# Patient Record
Sex: FEMALE | Race: OTHER | NOT HISPANIC OR LATINO
[De-identification: names, ages, dates, MRNs, and addresses within clinical notes are randomized per-mention and may not be internally consistent; named-entity substitution may affect disease eponyms.]

---

## 2017-02-07 ENCOUNTER — APPOINTMENT (OUTPATIENT)
Dept: UROGYNECOLOGY | Facility: CLINIC | Age: 67
End: 2017-02-07

## 2017-03-08 ENCOUNTER — OUTPATIENT (OUTPATIENT)
Dept: OUTPATIENT SERVICES | Facility: HOSPITAL | Age: 67
LOS: 1 days | End: 2017-03-08
Payer: COMMERCIAL

## 2017-03-08 DIAGNOSIS — R06.09 OTHER FORMS OF DYSPNEA: ICD-10-CM

## 2017-03-08 PROCEDURE — A9502: CPT

## 2017-03-08 PROCEDURE — 93017 CV STRESS TEST TRACING ONLY: CPT

## 2017-03-08 PROCEDURE — 78452 HT MUSCLE IMAGE SPECT MULT: CPT

## 2017-05-09 ENCOUNTER — APPOINTMENT (OUTPATIENT)
Dept: UROGYNECOLOGY | Facility: CLINIC | Age: 67
End: 2017-05-09

## 2017-08-08 ENCOUNTER — APPOINTMENT (OUTPATIENT)
Dept: UROGYNECOLOGY | Facility: CLINIC | Age: 67
End: 2017-08-08
Payer: COMMERCIAL

## 2017-08-08 PROCEDURE — 99213 OFFICE O/P EST LOW 20 MIN: CPT

## 2017-08-08 RX ORDER — ESTRADIOL 0.1 MG/G
0.1 CREAM VAGINAL
Qty: 0.5 | Refills: 3 | Status: ACTIVE | COMMUNITY
Start: 2017-06-09 | End: 1900-01-01

## 2017-08-23 ENCOUNTER — APPOINTMENT (OUTPATIENT)
Dept: UROGYNECOLOGY | Facility: CLINIC | Age: 67
End: 2017-08-23
Payer: COMMERCIAL

## 2017-08-23 PROCEDURE — 99213 OFFICE O/P EST LOW 20 MIN: CPT

## 2017-10-24 ENCOUNTER — APPOINTMENT (OUTPATIENT)
Dept: UROGYNECOLOGY | Facility: CLINIC | Age: 67
End: 2017-10-24
Payer: COMMERCIAL

## 2017-10-24 PROCEDURE — 99213 OFFICE O/P EST LOW 20 MIN: CPT

## 2018-01-26 ENCOUNTER — APPOINTMENT (OUTPATIENT)
Dept: UROGYNECOLOGY | Facility: CLINIC | Age: 68
End: 2018-01-26
Payer: MEDICARE

## 2018-01-26 PROCEDURE — 99213 OFFICE O/P EST LOW 20 MIN: CPT

## 2018-03-30 ENCOUNTER — APPOINTMENT (OUTPATIENT)
Dept: UROGYNECOLOGY | Facility: CLINIC | Age: 68
End: 2018-03-30
Payer: MEDICARE

## 2018-03-30 PROCEDURE — 99213 OFFICE O/P EST LOW 20 MIN: CPT

## 2018-04-03 ENCOUNTER — APPOINTMENT (OUTPATIENT)
Dept: UROGYNECOLOGY | Facility: CLINIC | Age: 68
End: 2018-04-03

## 2018-05-25 ENCOUNTER — APPOINTMENT (OUTPATIENT)
Dept: UROGYNECOLOGY | Facility: CLINIC | Age: 68
End: 2018-05-25
Payer: MEDICARE

## 2018-05-25 PROCEDURE — 99213 OFFICE O/P EST LOW 20 MIN: CPT

## 2018-07-14 ENCOUNTER — EMERGENCY (EMERGENCY)
Facility: HOSPITAL | Age: 68
LOS: 1 days | Discharge: HOME | End: 2018-07-14
Attending: EMERGENCY MEDICINE
Payer: COMMERCIAL

## 2018-07-14 VITALS
RESPIRATION RATE: 18 BRPM | DIASTOLIC BLOOD PRESSURE: 76 MMHG | SYSTOLIC BLOOD PRESSURE: 184 MMHG | TEMPERATURE: 98 F | OXYGEN SATURATION: 99 % | HEART RATE: 76 BPM

## 2018-07-14 PROCEDURE — 73630 X-RAY EXAM OF FOOT: CPT

## 2018-07-14 PROCEDURE — 99283 EMERGENCY DEPT VISIT LOW MDM: CPT | Mod: 25

## 2018-07-14 PROCEDURE — 73630 X-RAY EXAM OF FOOT: CPT | Mod: 26,LT

## 2018-07-14 PROCEDURE — 99282 EMERGENCY DEPT VISIT SF MDM: CPT

## 2018-07-14 NOTE — ED PROVIDER NOTE - PROGRESS NOTE DETAILS
Xray negative for fracture. History and findings consistent with contusion. Will dc with olivia taping and ortho shoe. Pt is well appearing walking with steady gait, stable for discharge and follow up without fail with medical doctor. I had a detailed discussion with the patient and/or guardian regarding the historical points, exam findings, and any diagnostic results supporting the discharge diagnosis. Pt educated on care and need for follow up. Strict return instructions and red flag signs and symptoms discussed with patient. Questions answered. Pt shows understanding of discharge information and agrees to follow.

## 2018-07-14 NOTE — ED PROVIDER NOTE - PHYSICAL EXAMINATION
MUSCULOSKELETAL: equal warmth b/l  DP and PT pulses 2+ b/l  cap refill <2 s  no skin breakdown  no swelling of 4th digit  very minimal tenderness to the MPT joint  no subungual hematoma MUSCULOSKELETAL: equal warmth b/l  DP and PT pulses 2+ b/l  cap refill <2 s  no skin breakdown  no swelling of 4th digit  very minimal tenderness to the MTP joint  no subungual hematoma

## 2018-07-14 NOTE — ED PROVIDER NOTE - OBJECTIVE STATEMENT
69 y/o F pt with a PMHx of DM and HTN and no significant PSHx presents to the ED c/o L 4th digit pain. Pt reports that she walked into a ladder and banged her toe. Pt states that she has been taking extra strength Tylenol with relief. Pt denies any other complaints. Allergy; lisinopril, aleve. 69 y/o F pt with a PMHx of DM and HTN and no significant PSHx presents to the ED c/o L 4th digit pain. Pt reports that she walked into a ladder and banged her toe 4 days ago. Pt states that she has been taking extra strength Tylenol with relief. Pt denies any other complaints. Allergy; lisinopril, aleve.

## 2018-07-14 NOTE — ED PROVIDER NOTE - CARE PLAN
Principal Discharge DX:	Contusion of lesser toe of left foot without damage to nail, initial encounter

## 2018-07-14 NOTE — ED ADULT NURSE NOTE - ADDITIONAL PRINTED INSTRUCTIONS GIVEN
Pt seen, treated and released in intake, No signs of distress noted, no nursing intervention required. Pt d/c by NP.

## 2018-07-14 NOTE — ED PROVIDER NOTE - MEDICAL DECISION MAKING DETAILS
67 y/o F pt with traumatic L foot 4th digit pain x4 days. Well appearing, neurovascular intact. Low suspicion of fx. Plans for x-ray, olivia splint, ortho shoe and reassess

## 2018-07-14 NOTE — ED PROVIDER NOTE - ATTENDING CONTRIBUTION TO CARE
68 year old female c/o left 4th toe pain s/p trauma x few days. X-ray WNL, toe contusion, supportive care

## 2018-07-26 ENCOUNTER — APPOINTMENT (OUTPATIENT)
Dept: UROGYNECOLOGY | Facility: CLINIC | Age: 68
End: 2018-07-26
Payer: MEDICARE

## 2018-07-26 PROCEDURE — 99213 OFFICE O/P EST LOW 20 MIN: CPT

## 2018-09-27 ENCOUNTER — APPOINTMENT (OUTPATIENT)
Dept: UROGYNECOLOGY | Facility: CLINIC | Age: 68
End: 2018-09-27
Payer: MEDICARE

## 2018-09-27 PROCEDURE — 99213 OFFICE O/P EST LOW 20 MIN: CPT

## 2018-10-11 ENCOUNTER — APPOINTMENT (OUTPATIENT)
Dept: UROGYNECOLOGY | Facility: CLINIC | Age: 68
End: 2018-10-11
Payer: MEDICARE

## 2018-10-11 PROCEDURE — 99213 OFFICE O/P EST LOW 20 MIN: CPT

## 2019-01-14 ENCOUNTER — APPOINTMENT (OUTPATIENT)
Dept: UROGYNECOLOGY | Facility: CLINIC | Age: 69
End: 2019-01-14
Payer: MEDICARE

## 2019-01-14 DIAGNOSIS — N93.9 ABNORMAL UTERINE AND VAGINAL BLEEDING, UNSPECIFIED: ICD-10-CM

## 2019-01-14 PROCEDURE — 99213 OFFICE O/P EST LOW 20 MIN: CPT

## 2019-01-14 NOTE — PHYSICAL EXAM
[No Acute Distress] : in no acute distress [Well developed] : well developed [Well Nourished] : ~L well nourished [Good Hygeine] : demonstrates good hygeine [Oriented x3] : oriented to person, place, and time [Normal Memory] : ~T memory was ~L unimpaired [Normal Mood/Affect] : mood and affect are normal [Soft, Nontender] : the abdomen was soft and nontender [Warm and Dry] : was warm and dry to touch [Normal Gait] : gait was normal [Normal Appearance] : general appearance was normal [Atrophy] : atrophy [Cystocele] : a cystocele [Uterine Prolapse] : uterine prolapse [Discharge] : a  ~M vaginal discharge was present [Scant] : scant [Laura] : yellow [Moderate] : there was moderate vaginal bleeding [Normal] : normal [FreeTextEntry4] : + irritation to left vaginal wall and apex with moderate bleeding

## 2019-01-14 NOTE — PROCEDURE
[Erosion] : evidence of erosion [Erythema] : erythema noted [Discharge] : there is vaginal discharge [Pessary Out] : removed [Pessary Washed] : washed [H2O] : H2O [Moderate] : moderate bleeding [Hemostatic Agent used (Silver Nitrate)] : a hemostatic agent (Silver Nitrate) was used to resolve bleeding [Infection] : no evidence of infection [FreeTextEntry1] : RS #3 [de-identified] : Silver nitrate applied and hemostasis achieved

## 2019-01-14 NOTE — HISTORY OF PRESENT ILLNESS
[FreeTextEntry1] : Pt. happy with pessary.  Reports good support.  Denies any pelvic pain or pressure.  Does note some spotting x "a few days".  Denies any problems with urination or BM.  Using premarin BIW as instructed.

## 2019-01-14 NOTE — DISCUSSION/SUMMARY
[FreeTextEntry1] : Pessary left out.  Pt to f/u in 2-3 weeks for re-evaluation and possible reinsertion.  Continue premarin BIW.  Instructed to call with any questions or concerns and she verbalizes understanding.\par

## 2019-02-01 ENCOUNTER — APPOINTMENT (OUTPATIENT)
Dept: UROGYNECOLOGY | Facility: CLINIC | Age: 69
End: 2019-02-01
Payer: MEDICARE

## 2019-02-01 PROCEDURE — 99213 OFFICE O/P EST LOW 20 MIN: CPT

## 2019-02-01 NOTE — PHYSICAL EXAM
[No Acute Distress] : in no acute distress [Well developed] : well developed [Well Nourished] : ~L well nourished [Good Hygeine] : demonstrates good hygeine [Oriented x3] : oriented to person, place, and time [Normal Memory] : ~T memory was ~L unimpaired [Normal Mood/Affect] : mood and affect are normal [Soft, Nontender] : the abdomen was soft and nontender [Warm and Dry] : was warm and dry to touch [Normal Gait] : gait was normal [Normal Appearance] : general appearance was normal [Cystocele] : a cystocele [Uterine Prolapse] : uterine prolapse [No Bleeding] : there was no active vaginal bleeding [Normal] : normal [FreeTextEntry4] : irritation sites healed

## 2019-02-01 NOTE — PROCEDURE
[Good Fit] : fits well [Pessary Inserted] : inserted [Pessary Washed] : washed [H2O] : H2O [None] : no bleeding [Erosion] : no evidence of erosion [Erythema] : no erythema [Discharge] : no vaginal discharge [Infection] : no evidence of infection [FreeTextEntry1] : RS #3

## 2019-02-01 NOTE — HISTORY OF PRESENT ILLNESS
[FreeTextEntry1] : RS # 3 left out at last visit due to bleeding.  She denies any bleeding since pessary has been out.  She does note bothersome bulge and pelvic pressure and desires pessary reinsertion.  She has been using vaginal estrogen BIW.

## 2019-02-01 NOTE — DISCUSSION/SUMMARY
[FreeTextEntry1] : Pessary reinserted.  Continue premarin BIW.  F/u in 3 months or sooner if needed.  Instructed to call with any questions or concerns and she verbalizes understanding.\par

## 2019-05-22 ENCOUNTER — APPOINTMENT (OUTPATIENT)
Dept: UROGYNECOLOGY | Facility: CLINIC | Age: 69
End: 2019-05-22
Payer: MEDICARE

## 2019-05-22 DIAGNOSIS — N89.9 NONINFLAMMATORY DISORDER OF VAGINA, UNSPECIFIED: ICD-10-CM

## 2019-05-22 PROCEDURE — 99213 OFFICE O/P EST LOW 20 MIN: CPT

## 2019-05-22 NOTE — HISTORY OF PRESENT ILLNESS
[FreeTextEntry1] : Pt. reports good support with pessary.  Denies any pelvic pain or pressure.  Does note some vaginal spotting x 3-4 days.  Denies any problems with urination or BM.  She is using premarin BIW as instructed.

## 2019-05-22 NOTE — PROCEDURE
[Good Fit] : fits well [Erythema] : erythema noted [Discharge] : there is vaginal discharge [Pessary Out] : removed [Pessary Washed] : washed [H2O] : H2O [Moderate] : moderate bleeding [Resolved w/pressure] : was resolved by applying pressure [Erosion] : no evidence of erosion [Infection] : no evidence of infection [FreeTextEntry1] : RS #3

## 2019-05-22 NOTE — DISCUSSION/SUMMARY
[FreeTextEntry1] : Pessary left out d/t bleeding.  Continue vaginal estrogen BIW.  F/u 2-3 weeks and will teach pt self care at that visit.  Instructed to call with any questions or concerns and she verbalizes understanding.\par

## 2019-05-22 NOTE — PHYSICAL EXAM
[No Acute Distress] : in no acute distress [Well developed] : well developed [Well Nourished] : ~L well nourished [Good Hygeine] : demonstrates good hygeine [Oriented x3] : oriented to person, place, and time [Normal Memory] : ~T memory was ~L unimpaired [Normal Mood/Affect] : mood and affect are normal [Soft, Nontender] : the abdomen was soft and nontender [Warm and Dry] : was warm and dry to touch [Normal Gait] : gait was normal [Normal Appearance] : general appearance was normal [Atrophy] : atrophy [Cystocele] : a cystocele [Uterine Prolapse] : uterine prolapse [Discharge] : a  ~M vaginal discharge was present [Laura] : yellow [Blood-Tinged] : blood-tinged [Moderate] : there was moderate vaginal bleeding [Normal] : normal [FreeTextEntry4] : + irritations to posterior and right vaginal wall with + bleeding

## 2019-06-05 ENCOUNTER — APPOINTMENT (OUTPATIENT)
Dept: UROGYNECOLOGY | Facility: CLINIC | Age: 69
End: 2019-06-05
Payer: MEDICARE

## 2019-06-05 PROCEDURE — 99213 OFFICE O/P EST LOW 20 MIN: CPT

## 2019-06-05 NOTE — PHYSICAL EXAM
[Well developed] : well developed [No Acute Distress] : in no acute distress [Well Nourished] : ~L well nourished [Good Hygeine] : demonstrates good hygeine [Oriented x3] : oriented to person, place, and time [Soft, Nontender] : the abdomen was soft and nontender [Normal Mood/Affect] : mood and affect are normal [Normal Memory] : ~T memory was ~L unimpaired [Normal Gait] : gait was normal [Warm and Dry] : was warm and dry to touch [Atrophy] : atrophy [Normal Appearance] : general appearance was normal [Cystocele] : a cystocele [Uterine Prolapse] : uterine prolapse [Discharge] : a  ~M vaginal discharge was present [White] : white [Scant] : there was scant vaginal bleeding [Normal] : normal [FreeTextEntry4] : Old irritation sites healed.  one small area to posterior vaginal wall with friable tissue and scant bleeding

## 2019-06-05 NOTE — PROCEDURE
[Good Fit] : fits well [Discharge] : there is vaginal discharge [Pessary Washed] : washed [Pessary Inserted] : inserted [Resolved w/pressure] : was resolved by applying pressure [H2O] : H2O [Mild] : mild bleeding [Erosion] : no evidence of erosion [Erythema] : no erythema [Infection] : no evidence of infection [FreeTextEntry1] : RS #3 [FreeTextEntry8] : Pt taught self care, she was successfully able to insert and remove pessary on her own x 2.

## 2019-06-05 NOTE — DISCUSSION/SUMMARY
[FreeTextEntry1] : Pt. to perform weekly self care.  Instructions written down for pt.  F/u in 3 months or sooner if needed.  Continue premarin BIW.  Instructed to call with any questions or concerns and she verbalizes understanding.\par

## 2019-06-05 NOTE — HISTORY OF PRESENT ILLNESS
[FreeTextEntry1] : Hx cystocele, uterovaginal prolapse.  RS #3 left out at last visit due to bleeding.  She has been using premarin BIW and denies any bleeding since pessary out.  Desires pessary reinsertion and self care teaching.

## 2019-09-09 ENCOUNTER — APPOINTMENT (OUTPATIENT)
Dept: UROGYNECOLOGY | Facility: CLINIC | Age: 69
End: 2019-09-09
Payer: MEDICARE

## 2019-09-09 PROCEDURE — 99213 OFFICE O/P EST LOW 20 MIN: CPT

## 2019-09-09 NOTE — PHYSICAL EXAM
[No Acute Distress] : in no acute distress [Well developed] : well developed [Good Hygeine] : demonstrates good hygeine [Well Nourished] : ~L well nourished [Oriented x3] : oriented to person, place, and time [Normal Memory] : ~T memory was ~L unimpaired [Soft, Nontender] : the abdomen was soft and nontender [Normal Mood/Affect] : mood and affect are normal [Normal Gait] : gait was normal [Warm and Dry] : was warm and dry to touch [Normal Appearance] : general appearance was normal [Cystocele] : a cystocele [Atrophy] : atrophy [Discharge] : a  ~M vaginal discharge was present [Uterine Prolapse] : uterine prolapse [Scant] : there was scant vaginal bleeding [Clear] : clear [Normal] : normal [FreeTextEntry4] : + friable granulation tissue to left vaginal wall with scant bleeding

## 2019-09-09 NOTE — DISCUSSION/SUMMARY
[FreeTextEntry1] : Pt was able to insert pessary on her own in office.  Advised continuing weekly self care and premarin BIW.  Notes premarin is expensive.  Names of alternatives given to her and advised to ask pharmacy and call if there is a cheaper alternative.  F/u 3-4 months or sooner if needed.  Instructed to call with any questions or concerns and she verbalizes understanding.\par

## 2019-09-09 NOTE — PROCEDURE
[Good Fit] : fits well [Erythema] : erythema noted [Discharge] : there is vaginal discharge [Pessary Washed] : washed [Pessary Inserted] : inserted [H2O] : H2O [Mild] : mild bleeding [Resolved w/pressure] : was resolved by applying pressure [Erosion] : no evidence of erosion [Infection] : no evidence of infection [FreeTextEntry1] : RS #3

## 2019-09-09 NOTE — HISTORY OF PRESENT ILLNESS
[FreeTextEntry1] : Pt. happy with pessary.  She denies any pelvic pain, pressure or vaginal bleeding.  Denies any problems with urination or BM.  She was performing weekly self care since last visit but removed pessary in august and was unable to replace.  It has been out x 1 month.  She is using premarin BIW as instructed.

## 2020-01-08 ENCOUNTER — APPOINTMENT (OUTPATIENT)
Dept: UROGYNECOLOGY | Facility: CLINIC | Age: 70
End: 2020-01-08
Payer: MEDICARE

## 2020-01-08 PROCEDURE — 99213 OFFICE O/P EST LOW 20 MIN: CPT

## 2020-01-08 NOTE — PHYSICAL EXAM
[No Acute Distress] : in no acute distress [Well developed] : well developed [Well Nourished] : ~L well nourished [Good Hygeine] : demonstrates good hygeine [Oriented x3] : oriented to person, place, and time [Normal Memory] : ~T memory was ~L unimpaired [Normal Mood/Affect] : mood and affect are normal [Soft, Nontender] : the abdomen was soft and nontender [Warm and Dry] : was warm and dry to touch [Normal Gait] : gait was normal [Normal Appearance] : general appearance was normal [Atrophy] : atrophy [Cystocele] : a cystocele [Uterine Prolapse] : uterine prolapse [Scant] : there was scant vaginal bleeding [Normal] : normal [FreeTextEntry4] : + granulation tissue to left vaginal wall at apex

## 2020-01-08 NOTE — DISCUSSION/SUMMARY
[FreeTextEntry1] : Pessary reinserted.  She will continue premarin BIW.  F/u 3 months or sooner if needed.  States premarin is expensive, when she is ready for a refill, she will call and we will try something different.  Instructed to call with any questions or concerns and she verbalizes understanding.\par

## 2020-01-08 NOTE — HISTORY OF PRESENT ILLNESS
[FreeTextEntry1] : Pt states she was having some spotting so she removed pessary about 1 month ago.  Denies any spotting since pessary has been out.  Denies any pain or pressure when pessary is in.  She is using premarin BIW.  C/o being uncomfortable without pessary and desires reinsertion.

## 2020-01-08 NOTE — PROCEDURE
[Good Fit] : fits well [Erythema] : erythema noted [Pessary Inserted] : inserted [Pessary Washed] : washed [H2O] : H2O [Mild] : mild bleeding [Resolved w/pressure] : was resolved by applying pressure [Infection] : no evidence of infection [Discharge] : no vaginal discharge [Erosion] : no evidence of erosion [FreeTextEntry1] : RS #3

## 2020-04-20 ENCOUNTER — APPOINTMENT (OUTPATIENT)
Dept: UROGYNECOLOGY | Facility: CLINIC | Age: 70
End: 2020-04-20
Payer: MEDICARE

## 2020-04-20 PROCEDURE — 99213 OFFICE O/P EST LOW 20 MIN: CPT

## 2020-04-20 NOTE — PHYSICAL EXAM
[No Acute Distress] : in no acute distress [Well developed] : well developed [Well Nourished] : ~L well nourished [Good Hygeine] : demonstrates good hygeine [Oriented x3] : oriented to person, place, and time [Normal Mood/Affect] : mood and affect are normal [Normal Memory] : ~T memory was ~L unimpaired [Soft, Nontender] : the abdomen was soft and nontender [Warm and Dry] : was warm and dry to touch [Normal Gait] : gait was normal [Normal Appearance] : general appearance was normal [Atrophy] : atrophy [Cystocele] : a cystocele [Uterine Prolapse] : uterine prolapse [Discharge] : a  ~M vaginal discharge was present [Scant] : scant [Laura] : yellow [Moderate] : there was moderate vaginal bleeding [Normal] : normal [FreeTextEntry4] : + irritation to posterior vaginal wall with bleeding

## 2020-04-20 NOTE — DISCUSSION/SUMMARY
[FreeTextEntry1] : Source of bleeding identified on exam.  Pessary was reinserted.  Pt is able to remove on her own but not reinsert.  If she continues to have spotting this week, she will remove pessary on her own and schedule f/u appointment to reassess and reinsert in 2 weeks.  If no spotting, she will f/u in 2-3 months.  Continue premarin BIW.  Pt verbalizes understanding of plan.  Instructed to call with any questions or concerns and she verbalizes understanding.\par

## 2020-04-20 NOTE — PROCEDURE
[Good Fit] : fits well [Discharge] : there is vaginal discharge [Erythema] : erythema noted [Pessary Inserted] : inserted [H2O] : H2O [Pessary Washed] : washed [Moderate] : moderate bleeding [Resolved w/pressure] : was resolved by applying pressure [Infection] : no evidence of infection [FreeTextEntry1] : RS #3

## 2020-06-22 ENCOUNTER — APPOINTMENT (OUTPATIENT)
Dept: UROGYNECOLOGY | Facility: CLINIC | Age: 70
End: 2020-06-22
Payer: MEDICARE

## 2020-06-22 PROCEDURE — 99213 OFFICE O/P EST LOW 20 MIN: CPT

## 2020-06-22 NOTE — PROCEDURE
[Good Fit] : fits well [Erythema] : erythema noted [Pessary Inserted] : inserted [Pessary Washed] : washed [H2O] : H2O [Resolved w/pressure] : was resolved by applying pressure [Mild] : mild bleeding [Erosion] : no evidence of erosion [Discharge] : no vaginal discharge [Infection] : no evidence of infection [FreeTextEntry1] : RS #3

## 2020-06-22 NOTE — HISTORY OF PRESENT ILLNESS
[FreeTextEntry1] : Pt last seen 4/20.  Per note, if she had further spotting she would remove pessary on her own.  Notes that she had some spotting about 10 days after visit so she removed the pessary.  It has been out since.  She denies any bleeding or spotting since pessary has been out.  She does note bothersome vaginal bulge and would like pessary reinserted.  She has been using premarin BIW and denies any problems with urination or BM.

## 2020-06-22 NOTE — DISCUSSION/SUMMARY
[FreeTextEntry1] : Pessary was reinserted.  If spotting occurs again, she will remove pessary at home.  Continue premarin BIW and f/u 3 months or sooner if needed.  Instructed to call with any questions or concerns and she verbalizes understanding.\par

## 2020-06-22 NOTE — PHYSICAL EXAM
[No Acute Distress] : in no acute distress [Well developed] : well developed [Good Hygeine] : demonstrates good hygeine [Well Nourished] : ~L well nourished [Normal Memory] : ~T memory was ~L unimpaired [Normal Mood/Affect] : mood and affect are normal [Oriented x3] : oriented to person, place, and time [Soft, Nontender] : the abdomen was soft and nontender [Warm and Dry] : was warm and dry to touch [Normal Gait] : gait was normal [Normal Appearance] : general appearance was normal [Atrophy] : atrophy [Uterine Prolapse] : uterine prolapse [Cystocele] : a cystocele [Normal] : normal [Scant] : there was scant vaginal bleeding [FreeTextEntry4] : + friable granulation tissue to posterior vaginal wall which bled when touched with scopette

## 2020-08-03 ENCOUNTER — APPOINTMENT (OUTPATIENT)
Dept: UROGYNECOLOGY | Facility: CLINIC | Age: 70
End: 2020-08-03
Payer: COMMERCIAL

## 2020-08-03 PROCEDURE — 99213 OFFICE O/P EST LOW 20 MIN: CPT

## 2020-08-03 NOTE — DISCUSSION/SUMMARY
[FreeTextEntry1] : + bleeding on exam.  Pessary left out and advised f/u in 2-3 weeks for re-evaluation.  Continue premarin BIW.  Discussed pelvic ultrasound to r/o uterine cause, however this is unlikely as source of bleeding was identified on exam.  If pt has any bleeding with pessary out, she will have ultrasound done.  She verbalizes understanding plan.  Instructed to call with any questions or concerns and she verbalizes understanding.\par

## 2020-08-03 NOTE — PROCEDURE
[Pessary Out] : removed [Mild] : mild bleeding [H2O] : H2O [Resolved w/pressure] : was resolved by applying pressure [FreeTextEntry1] : RS #3

## 2020-08-03 NOTE — HISTORY OF PRESENT ILLNESS
[FreeTextEntry1] : Pt last seen 6/22/20.  Notes that on 7/24 she had some spotting so she removed the pessary on her own.  After removal, she had an episode of "period like bleeding" which resolved spontaneously.  She has not has any bleeding since pessary has been out.  She is nervous that she caused damage when she removed pessary.  Does note bothersome bulge without pessary in place.  She is using premarin BIW.

## 2020-08-03 NOTE — PHYSICAL EXAM
[Well developed] : well developed [No Acute Distress] : in no acute distress [Well Nourished] : ~L well nourished [Good Hygeine] : demonstrates good hygeine [Oriented x3] : oriented to person, place, and time [Normal Memory] : ~T memory was ~L unimpaired [Soft, Nontender] : the abdomen was soft and nontender [Normal Mood/Affect] : mood and affect are normal [Warm and Dry] : was warm and dry to touch [Normal Gait] : gait was normal [Normal Appearance] : general appearance was normal [Atrophy] : atrophy [Cystocele] : a cystocele [Uterine Prolapse] : uterine prolapse [White] : white [Discharge] : a  ~M vaginal discharge was present [Scant] : there was scant vaginal bleeding [Normal] : normal [FreeTextEntry4] : + friable granulation tissue to right vaginal wall with bleeding

## 2020-08-17 ENCOUNTER — APPOINTMENT (OUTPATIENT)
Dept: UROGYNECOLOGY | Facility: CLINIC | Age: 70
End: 2020-08-17

## 2020-08-17 ENCOUNTER — APPOINTMENT (OUTPATIENT)
Dept: UROGYNECOLOGY | Facility: CLINIC | Age: 70
End: 2020-08-17
Payer: COMMERCIAL

## 2020-08-17 PROCEDURE — 99213 OFFICE O/P EST LOW 20 MIN: CPT

## 2020-08-17 NOTE — PHYSICAL EXAM
[No Acute Distress] : in no acute distress [Well developed] : well developed [Well Nourished] : ~L well nourished [Good Hygeine] : demonstrates good hygeine [Normal Memory] : ~T memory was ~L unimpaired [Oriented x3] : oriented to person, place, and time [Normal Mood/Affect] : mood and affect are normal [Warm and Dry] : was warm and dry to touch [Soft, Nontender] : the abdomen was soft and nontender [Atrophy] : atrophy [Normal Appearance] : general appearance was normal [Cystocele] : a cystocele [Uterine Prolapse] : uterine prolapse [Discharge] : a  ~M vaginal discharge was present [Scant] : scant [White] : white [Normal] : normal [No Bleeding] : there was no active vaginal bleeding [FreeTextEntry4] : cystocele past introitus

## 2020-08-17 NOTE — HISTORY OF PRESENT ILLNESS
[FreeTextEntry1] : Pt was seen 8/3 and there was still some friable granulation tissue.  Pessary has now been out since 7/24 and she denies any bleeding since pessary has been out.  She desires reinsertion.  Using premarin BIW.

## 2020-08-17 NOTE — PROCEDURE
[Good Fit] : fits well [Discharge] : there is vaginal discharge [Pessary Inserted] : inserted [Pessary Washed] : washed [None] : no bleeding [H2O] : H2O [Erosion] : no evidence of erosion [Erythema] : no erythema [FreeTextEntry1] : RS #3 [Infection] : no evidence of infection

## 2020-08-17 NOTE — DISCUSSION/SUMMARY
[FreeTextEntry1] : No further bleeding on exam.  Pessary inserted.  Discussed the possibility of pessary falling out at home as she cystocele was past introitus on exam.  Continue premarin BIW and f/u in 3 months or sooner if needed.  Instructed to call with any questions or concerns and she verbalizes understanding.\par

## 2020-09-21 ENCOUNTER — APPOINTMENT (OUTPATIENT)
Dept: UROGYNECOLOGY | Facility: CLINIC | Age: 70
End: 2020-09-21

## 2020-09-28 ENCOUNTER — APPOINTMENT (OUTPATIENT)
Dept: UROGYNECOLOGY | Facility: CLINIC | Age: 70
End: 2020-09-28
Payer: COMMERCIAL

## 2020-09-28 DIAGNOSIS — N95.0 POSTMENOPAUSAL BLEEDING: ICD-10-CM

## 2020-09-28 DIAGNOSIS — N89.8 OTHER SPECIFIED NONINFLAMMATORY DISORDERS OF VAGINA: ICD-10-CM

## 2020-09-28 PROCEDURE — 99213 OFFICE O/P EST LOW 20 MIN: CPT

## 2020-09-28 NOTE — PHYSICAL EXAM
[No Acute Distress] : in no acute distress [Well developed] : well developed [Well Nourished] : ~L well nourished [Good Hygeine] : demonstrates good hygeine [Oriented x3] : oriented to person, place, and time [Normal Memory] : ~T memory was ~L unimpaired [Normal Mood/Affect] : mood and affect are normal [Soft, Nontender] : the abdomen was soft and nontender [Warm and Dry] : was warm and dry to touch [Normal Gait] : gait was normal [Normal Appearance] : general appearance was normal [Atrophy] : atrophy [Cystocele] : a cystocele [Uterine Prolapse] : uterine prolapse [Discharge] : a  ~M vaginal discharge was present [White] : white [Scant] : there was scant vaginal bleeding [Normal] : normal [FreeTextEntry4] : + irritation to posterior vaginal wall with + bleeding [de-identified] : + friable granulation tissue from 7:00-9:00 with + bleeding

## 2020-09-28 NOTE — DISCUSSION/SUMMARY
[FreeTextEntry1] : Discussed that friable granulation tissue to cervix is the likely source of spotting.  Pt is very anxious re: vaginal bleeding.  Discussed going for pelvic ultrasound to r/o uterine cause and pt would like to have this done.  Pessary left out, she will schedule ultrasound.  She will hold off on vaginal estrogen use until ultrasound is completed.  F/u 2 weeks or sooner if needed.  Instructed to call with any questions or concerns and she verbalizes understanding.\par

## 2020-09-28 NOTE — PROCEDURE
[Good Fit] : fits well [Erosion] : no evidence of erosion [Erythema] : erythema noted [Discharge] : there is vaginal discharge [Infection] : no evidence of infection [Pessary Out] : removed [H2O] : H2O [Mild] : mild bleeding [Resolved w/pressure] : was resolved by applying pressure [FreeTextEntry1] : RS #3

## 2020-09-28 NOTE — HISTORY OF PRESENT ILLNESS
[FreeTextEntry1] : Pt last seen 8/17/20.  Prior to that visit, pessary had been left out d/t bleeding.  No bleeding when pessary was out so pessary was reinserted.  Now pt c/o intermittent spotting x a few weeks.  She is using vaginal estrogen BIW.  Denies any problems with urination and she denies any constipation.  Pt very concerned.

## 2020-10-02 ENCOUNTER — APPOINTMENT (OUTPATIENT)
Dept: ULTRASOUND IMAGING | Facility: CLINIC | Age: 70
End: 2020-10-02
Payer: COMMERCIAL

## 2020-10-02 ENCOUNTER — OUTPATIENT (OUTPATIENT)
Dept: OUTPATIENT SERVICES | Facility: HOSPITAL | Age: 70
LOS: 1 days | End: 2020-10-02
Payer: COMMERCIAL

## 2020-10-02 DIAGNOSIS — Z00.8 ENCOUNTER FOR OTHER GENERAL EXAMINATION: ICD-10-CM

## 2020-10-02 PROCEDURE — 76830 TRANSVAGINAL US NON-OB: CPT | Mod: 26

## 2020-10-02 PROCEDURE — 76830 TRANSVAGINAL US NON-OB: CPT

## 2020-10-14 ENCOUNTER — APPOINTMENT (OUTPATIENT)
Dept: UROGYNECOLOGY | Facility: CLINIC | Age: 70
End: 2020-10-14
Payer: COMMERCIAL

## 2020-10-14 PROCEDURE — 99213 OFFICE O/P EST LOW 20 MIN: CPT

## 2020-10-14 NOTE — HISTORY OF PRESENT ILLNESS
[FreeTextEntry1] : Pt had pessary left out due to irritation.  PT had Pelvic US and was negative.\par She would like to have pessary reinserted as she is uncomfortable and prolapse is coming down.\par She is using Premarin cream twice a week.

## 2020-10-14 NOTE — PHYSICAL EXAM
[No Acute Distress] : in no acute distress [Well developed] : well developed [Good Hygeine] : demonstrates good hygeine [Well Nourished] : ~L well nourished [Oriented x3] : oriented to person, place, and time [Normal Memory] : ~T memory was ~L unimpaired [Normal Mood/Affect] : mood and affect are normal [Warm and Dry] : was warm and dry to touch [Soft, Nontender] : the abdomen was soft and nontender [Normal Gait] : gait was normal [Normal Appearance] : general appearance was normal [Atrophy] : atrophy [Discharge] : a  ~M vaginal discharge was present [Cystocele] : a cystocele [Uterine Prolapse] : uterine prolapse [No Bleeding] : there was no active vaginal bleeding [Scant] : scant [White] : white [Normal] : normal [FreeTextEntry4] : Old irritation site healed [de-identified] : + friable granulation tissue from 7:00-9:00 with + bleeding

## 2020-10-14 NOTE — PROCEDURE
[Good Fit] : fits well [Discharge] : there is vaginal discharge [Erythema] : erythema noted [Pessary Inserted] : inserted [Mild] : mild bleeding [Pessary Washed] : washed [H2O] : H2O [Resolved w/pressure] : was resolved by applying pressure [Infection] : no evidence of infection [Erosion] : no evidence of erosion [FreeTextEntry1] : Ring with Support #3 [FreeTextEntry3] : Premarin cream [FreeTextEntry8] : Reinforced daily pericare.

## 2020-10-14 NOTE — DISCUSSION/SUMMARY
[FreeTextEntry1] : Pessary reinserted.  She will continue use of Premarin cream.  She will follow up end of November for pessary care.\par If pt have any problems/concern to call office. PT aware and agrees.

## 2020-11-16 ENCOUNTER — APPOINTMENT (OUTPATIENT)
Dept: UROGYNECOLOGY | Facility: CLINIC | Age: 70
End: 2020-11-16

## 2020-11-25 ENCOUNTER — APPOINTMENT (OUTPATIENT)
Dept: UROGYNECOLOGY | Facility: CLINIC | Age: 70
End: 2020-11-25
Payer: COMMERCIAL

## 2020-11-25 VITALS — WEIGHT: 128 LBS | HEIGHT: 60 IN | TEMPERATURE: 96.8 F | BODY MASS INDEX: 25.13 KG/M2

## 2020-11-25 VITALS — HEART RATE: 83 BPM | OXYGEN SATURATION: 93 %

## 2020-11-25 PROCEDURE — 99214 OFFICE O/P EST MOD 30 MIN: CPT

## 2020-11-25 NOTE — PROCEDURE
[Good Fit] : fits well [Erythema] : erythema noted [Discharge] : there is vaginal discharge [Pessary Inserted] : inserted [Pessary Washed] : washed [H2O] : H2O [Mild] : mild bleeding [Resolved w/pressure] : was resolved by applying pressure [Erosion] : no evidence of erosion [Infection] : no evidence of infection [FreeTextEntry1] : Ring with Support #3 [FreeTextEntry3] : Premarin cream [FreeTextEntry8] : Reinforced daily pericare.

## 2020-11-25 NOTE — HISTORY OF PRESENT ILLNESS
[FreeTextEntry1] : PT doing well with pessary.  Denies any pain, discomfort, or vaginal bleeding.  Voiding and moving BM without problems.\par She is using Premarin cream twice a week.

## 2020-11-25 NOTE — DISCUSSION/SUMMARY
[FreeTextEntry1] : Follow up in 2 months or sooner.  \par If pt have any problems/concern to call office. PT aware and agrees.

## 2021-01-27 ENCOUNTER — APPOINTMENT (OUTPATIENT)
Dept: UROGYNECOLOGY | Facility: CLINIC | Age: 71
End: 2021-01-27

## 2021-02-05 ENCOUNTER — APPOINTMENT (OUTPATIENT)
Dept: UROGYNECOLOGY | Facility: CLINIC | Age: 71
End: 2021-02-05
Payer: MEDICARE

## 2021-02-05 PROCEDURE — 99072 ADDL SUPL MATRL&STAF TM PHE: CPT

## 2021-02-05 PROCEDURE — 99213 OFFICE O/P EST LOW 20 MIN: CPT

## 2021-02-05 NOTE — PHYSICAL EXAM
[No Acute Distress] : in no acute distress [Well developed] : well developed [Well Nourished] : ~L well nourished [Good Hygeine] : demonstrates good hygeine [Oriented x3] : oriented to person, place, and time [Normal Memory] : ~T memory was ~L unimpaired [Normal Mood/Affect] : mood and affect are normal [Soft, Nontender] : the abdomen was soft and nontender [Warm and Dry] : was warm and dry to touch [Normal Gait] : gait was normal [Normal Appearance] : general appearance was normal [Atrophy] : atrophy [Cystocele] : a cystocele [Uterine Prolapse] : uterine prolapse [Discharge] : a  ~M vaginal discharge was present [Scant] : scant [White] : white [Normal] : normal [No Bleeding] : there was no active vaginal bleeding

## 2021-02-05 NOTE — HISTORY OF PRESENT ILLNESS
[FreeTextEntry1] : PT doing well with pessary.  Denies any pain, discomfort, or vaginal bleeding.  Voiding and moving BM without problems.\par She is using Premarin cream twice a week.\par Pt's 89y/o  mother just passed away 1 week ago.  Pt is sad but she have good family/friend support.

## 2021-02-05 NOTE — DISCUSSION/SUMMARY
[FreeTextEntry1] : Follow up in 2-3 months or sooner.  \par If pt have any problems/concern to call office. PT aware and agrees.

## 2021-05-04 ENCOUNTER — APPOINTMENT (OUTPATIENT)
Dept: UROGYNECOLOGY | Facility: CLINIC | Age: 71
End: 2021-05-04
Payer: MEDICARE

## 2021-05-04 VITALS
BODY MASS INDEX: 25.39 KG/M2 | DIASTOLIC BLOOD PRESSURE: 80 MMHG | SYSTOLIC BLOOD PRESSURE: 177 MMHG | TEMPERATURE: 96.6 F | WEIGHT: 129.31 LBS | OXYGEN SATURATION: 99 % | HEIGHT: 60 IN | HEART RATE: 88 BPM

## 2021-05-04 PROCEDURE — 99072 ADDL SUPL MATRL&STAF TM PHE: CPT

## 2021-05-04 PROCEDURE — 99213 OFFICE O/P EST LOW 20 MIN: CPT

## 2021-05-04 NOTE — HISTORY OF PRESENT ILLNESS
[FreeTextEntry1] : PT doing well with pessary.  Denies any pain, discomfort, or vaginal bleeding.  Voiding and moving BM without problems. Noticed vaginal bulge at the vaginal opening.  \par She is using Premarin cream twice a week.\par

## 2021-05-04 NOTE — PHYSICAL EXAM
[No Acute Distress] : in no acute distress [Well developed] : well developed [Well Nourished] : ~L well nourished [Good Hygeine] : demonstrates good hygeine [Oriented x3] : oriented to person, place, and time [Normal Memory] : ~T memory was ~L unimpaired [Normal Mood/Affect] : mood and affect are normal [Soft, Nontender] : the abdomen was soft and nontender [Warm and Dry] : was warm and dry to touch [Normal Gait] : gait was normal [Vulvar Atrophy] : vulvar atrophy [Labia Majora] : were normal [Normal Appearance] : general appearance was normal [Atrophy] : atrophy [Cystocele] : a cystocele [Uterine Prolapse] : uterine prolapse [Discharge] : a  ~M vaginal discharge was present [Scant] : scant [White] : white [No Bleeding] : there was no active vaginal bleeding [Normal] : normal [de-identified] : vaginal bulge noted at vaginal opening.

## 2021-05-04 NOTE — PROCEDURE
[Refit] : refit needed [Discharge] : there is vaginal discharge [Pessary Inserted] : inserted [Pessary Washed] : washed [H2O] : H2O [Mild] : mild bleeding [Resolved w/pressure] : was resolved by applying pressure [Medication Review] : Medicaiton Review: Patient verbalizes understanding of risks and benefits [Good Fit] : fit is not good [Erosion] : no evidence of erosion [Erythema] : no erythema [Infection] : no evidence of infection [FreeTextEntry1] : Ring with Support #3 [de-identified] : not supportive [de-identified] : Jhonny LS # 2 1/4 [FreeTextEntry3] : Premarin cream [FreeTextEntry8] : Reinforced daily pericare.

## 2021-06-03 ENCOUNTER — APPOINTMENT (OUTPATIENT)
Dept: UROGYNECOLOGY | Facility: CLINIC | Age: 71
End: 2021-06-03
Payer: MEDICARE

## 2021-06-03 PROCEDURE — 99213 OFFICE O/P EST LOW 20 MIN: CPT

## 2021-06-03 NOTE — PHYSICAL EXAM
[No Acute Distress] : in no acute distress [Well developed] : well developed [Well Nourished] : ~L well nourished [Good Hygeine] : demonstrates good hygeine [Oriented x3] : oriented to person, place, and time [Normal Memory] : ~T memory was ~L unimpaired [Normal Mood/Affect] : mood and affect are normal [Soft, Nontender] : the abdomen was soft and nontender [Warm and Dry] : was warm and dry to touch [Normal Gait] : gait was normal [Vulvar Atrophy] : vulvar atrophy [Labia Majora] : were normal [Normal Appearance] : general appearance was normal [Atrophy] : atrophy [Cystocele] : a cystocele [Uterine Prolapse] : uterine prolapse [Discharge] : a  ~M vaginal discharge was present [Scant] : scant [White] : white [No Bleeding] : there was no active vaginal bleeding [Normal] : normal [de-identified] : vaginal bulge noted at vaginal opening.  Gellhorn very uncomfortable.

## 2021-06-03 NOTE — DISCUSSION/SUMMARY
[FreeTextEntry1] : Pelvic prolapse:\par -Fitted with Shaatz # 4 (2.50).\par -Precaution and instructions were reviewed.\par \par Vaginal atrophy:\par -Continue use of Premarin cream BIW.\par -May use vaginal lubrication on alternative days.\par \par Follow up in 1 month or sooner.  \par If pt have any problems/concern to call office. PT aware and agrees.

## 2021-06-03 NOTE — HISTORY OF PRESENT ILLNESS
[FreeTextEntry1] : PT doing well with pessary.  Denies any pain, discomfort, or vaginal bleeding.  Voiding and moving BM without problems. Noticed vaginal bulge at the vaginal opening.  Pt was refitted at last visit with Gellhorn LS # 2 1/4.  Pt c/o being uncomfortable and falling out and she doesn't want to have this particular pessary.\par She is using Premarin cream twice a week.\par \par Pt had tried Ring with support # 3 and Gellhorn LS # 2 1/4.  Not supportive and uncomfortable with Gellhorn.

## 2021-06-03 NOTE — PROCEDURE
[Good Fit] : fit is not good [Refit] : refit needed [Erosion] : no evidence of erosion [Erythema] : no erythema [Discharge] : there is vaginal discharge [Infection] : no evidence of infection [Pessary Inserted] : inserted [Mild] : mild bleeding [Resolved w/pressure] : was resolved by applying pressure [Medication Review] : Medicaiton Review: Patient verbalizes understanding of risks and benefits [FreeTextEntry1] : had Ring with Support #3, refitted with Gellhorn LS # 2 1/4 [de-identified] : not supportive, and uncomfortable. [de-identified] : Sonal # 4 [FreeTextEntry3] : Premarin cream [FreeTextEntry8] : Reinforced daily pericare.

## 2021-07-09 ENCOUNTER — APPOINTMENT (OUTPATIENT)
Dept: UROGYNECOLOGY | Facility: CLINIC | Age: 71
End: 2021-07-09
Payer: MEDICARE

## 2021-07-09 PROCEDURE — 99213 OFFICE O/P EST LOW 20 MIN: CPT

## 2021-07-09 NOTE — HISTORY OF PRESENT ILLNESS
[FreeTextEntry1] : May, 69y/o female presents to the office for pelvic prolapse.  Was fitted with Shaatz # 4 (2.50),  PT doing well with pessary.  Denies any pain, discomfort, or vaginal bleeding.  Voiding and moving BM with some constipation.\par She is using Premarin cream twice a week.\par Pessary fell out the other day with constipation.  Otherwise, she feels the size is comfortable and supportive.\par \par Pt had tried Ring with support # 3 and Gellhorn LS # 2 1/4.  Not supportive and uncomfortable with Gellhorn.

## 2021-07-09 NOTE — DISCUSSION/SUMMARY
[FreeTextEntry1] : Pelvic prolapse:\par -Continue with Shaatz # 4 (2.50).\par -Precaution and instructions were reviewed.\par \par Vaginal atrophy:\par -Continue use of Premarin cream BIW.\par -May use vaginal lubrication on alternative days.\par \par Follow up in 2-3 months or sooner.  \par If pt have any problems/concern to call office. PT aware and agrees.

## 2021-07-09 NOTE — PROCEDURE
[Good Fit] : fits well [Discharge] : there is vaginal discharge [Pessary Inserted] : inserted [Pessary Washed] : washed [H2O] : H2O [Mild] : mild bleeding [Resolved w/pressure] : was resolved by applying pressure [Medication Review] : Medicaiton Review: Patient verbalizes understanding of risks and benefits [Erosion] : no evidence of erosion [Erythema] : no erythema [Infection] : no evidence of infection [FreeTextEntry1] : Sonal# 4 (2.50) [FreeTextEntry3] : Premarin cream [FreeTextEntry8] : Reinforced daily pericare.

## 2021-07-09 NOTE — PHYSICAL EXAM
[No Acute Distress] : in no acute distress [Well developed] : well developed [Well Nourished] : ~L well nourished [Good Hygeine] : demonstrates good hygeine [Oriented x3] : oriented to person, place, and time [Normal Memory] : ~T memory was ~L unimpaired [Normal Mood/Affect] : mood and affect are normal [Soft, Nontender] : the abdomen was soft and nontender [Warm and Dry] : was warm and dry to touch [Normal Gait] : gait was normal [Vulvar Atrophy] : vulvar atrophy [Labia Majora] : were normal [Normal Appearance] : general appearance was normal [Atrophy] : atrophy [Cystocele] : a cystocele [Uterine Prolapse] : uterine prolapse [Discharge] : a  ~M vaginal discharge was present [Scant] : scant [White] : white [No Bleeding] : there was no active vaginal bleeding [Normal] : normal [de-identified] : vaginal bulge noted at vaginal opening

## 2021-09-03 ENCOUNTER — APPOINTMENT (OUTPATIENT)
Dept: UROGYNECOLOGY | Facility: CLINIC | Age: 71
End: 2021-09-03
Payer: MEDICARE

## 2021-09-03 PROCEDURE — 99213 OFFICE O/P EST LOW 20 MIN: CPT

## 2021-09-03 NOTE — PHYSICAL EXAM
[No Acute Distress] : in no acute distress [Well developed] : well developed [Well Nourished] : ~L well nourished [Good Hygeine] : demonstrates good hygeine [Oriented x3] : oriented to person, place, and time [Normal Memory] : ~T memory was ~L unimpaired [Normal Mood/Affect] : mood and affect are normal [Soft, Nontender] : the abdomen was soft and nontender [Warm and Dry] : was warm and dry to touch [Normal Gait] : gait was normal [Vulvar Atrophy] : vulvar atrophy [Labia Majora] : were normal [Normal Appearance] : general appearance was normal [Atrophy] : atrophy [Cystocele] : a cystocele [Uterine Prolapse] : uterine prolapse [Discharge] : a  ~M vaginal discharge was present [Scant] : scant [White] : white [No Bleeding] : there was no active vaginal bleeding [Normal] : normal [de-identified] : vaginal bulge noted at vaginal opening

## 2021-09-03 NOTE — HISTORY OF PRESENT ILLNESS
[FreeTextEntry1] : May, 71y/o female presents to the office for pelvic prolapse.  Was fitted with Shaatz # 4 (2.50),  PT doing well with pessary.  Denies any pain, discomfort, or vaginal bleeding.  Voiding and moving BM with some constipation.\par She is using Premarin cream twice a week.\par Pessary fell out the other day with constipation.  Otherwise, she feels the size is comfortable and supportive.\par \par Pt had tried Ring with support # 3 and Gellhorn LS # 2 1/4.  Not supportive and uncomfortable with Gellhorn.

## 2021-09-22 ENCOUNTER — APPOINTMENT (OUTPATIENT)
Dept: UROGYNECOLOGY | Facility: CLINIC | Age: 71
End: 2021-09-22
Payer: MEDICARE

## 2021-09-22 VITALS
DIASTOLIC BLOOD PRESSURE: 77 MMHG | SYSTOLIC BLOOD PRESSURE: 146 MMHG | TEMPERATURE: 97.3 F | HEART RATE: 76 BPM | OXYGEN SATURATION: 100 %

## 2021-09-22 PROCEDURE — 99213 OFFICE O/P EST LOW 20 MIN: CPT

## 2021-09-22 NOTE — HISTORY OF PRESENT ILLNESS
[FreeTextEntry1] : May, 69y/o female with known hx of POP managed with Shaatz #4 (2.50) presents to the office with complaints of feeling slight;y uncomfortable from the pessary.  Denies any pelvic pain, pressure or vaginal bleeding.  Denies any urinary complaints. She states she has been having constipation. She manages her constipation with fluids and prune juice, though she continues to have constipation at times. She is overall happy with the pessary and the support it provides she just wants to make sure it is in the correct position. \par She is using Premarin cream twice a week.\par \par Pt had tried Ring with support # 3 and Gellhorn LS # 2 1/4.  Not supportive and uncomfortable with Gellhorn.

## 2021-09-22 NOTE — DISCUSSION/SUMMARY
[FreeTextEntry1] : Pelvic prolapse:\par - Continue with Shaatz # 4 (2.50).\par - Pessary Precautions and instructions were reviewed.\par - Discussed importance of preventing constipation by managing with fluids, diet, and OTC stool softeners as needed\par \par Vaginal atrophy:\par - Continue use of Premarin cream BIW.\par - May use vaginal lubrication on alternative days.\par \par Follow up in 2-3 months or sooner if needed  \par If pt have any problems/concern to call office. PT aware and agrees.

## 2021-09-22 NOTE — PHYSICAL EXAM
[No Acute Distress] : in no acute distress [Well developed] : well developed [Well Nourished] : ~L well nourished [Good Hygeine] : demonstrates good hygeine [Oriented x3] : oriented to person, place, and time [Normal Memory] : ~T memory was ~L unimpaired [Normal Mood/Affect] : mood and affect are normal [Soft, Nontender] : the abdomen was soft and nontender [Warm and Dry] : was warm and dry to touch [Normal Gait] : gait was normal [Vulvar Atrophy] : vulvar atrophy [Labia Majora] : were normal [Normal Appearance] : general appearance was normal [Atrophy] : atrophy [Cystocele] : a cystocele [Uterine Prolapse] : uterine prolapse [Discharge] : a  ~M vaginal discharge was present [Scant] : scant [White] : white [No Bleeding] : there was no active vaginal bleeding [Normal] : normal [de-identified] : vaginal bulge noted at vaginal opening [FreeTextEntry4] : Pessary found to be slightly tilted in vaginal canal

## 2021-09-22 NOTE — PROCEDURE
[Good Fit] : fits well [Erosion] : no evidence of erosion [Erythema] : no erythema [Discharge] : there is vaginal discharge [Infection] : no evidence of infection [Pessary Inserted] : inserted [Pessary Washed] : washed [H2O] : H2O [None] : no bleeding [Medication Review] : Medicaiton Review: Patient verbalizes understanding of risks and benefits [FreeTextEntry1] : Sonal# 4 (2.50) [de-identified] : discussed refitting with bigger size, patient refused states she is happy with this pessary and does not want to try another size or shape at this time.  [FreeTextEntry3] : Premarin cream [FreeTextEntry8] : Reinforced daily pericare.

## 2021-09-26 ENCOUNTER — INPATIENT (INPATIENT)
Facility: HOSPITAL | Age: 71
LOS: 0 days | Discharge: ANOTHER HOSPITAL | DRG: 305 | End: 2021-09-27
Attending: HOSPITALIST | Admitting: HOSPITALIST
Payer: COMMERCIAL

## 2021-09-26 VITALS
RESPIRATION RATE: 22 BRPM | TEMPERATURE: 98 F | WEIGHT: 130.07 LBS | DIASTOLIC BLOOD PRESSURE: 91 MMHG | OXYGEN SATURATION: 95 % | HEART RATE: 100 BPM | SYSTOLIC BLOOD PRESSURE: 176 MMHG | HEIGHT: 60 IN

## 2021-09-26 DIAGNOSIS — Z29.9 ENCOUNTER FOR PROPHYLACTIC MEASURES, UNSPECIFIED: ICD-10-CM

## 2021-09-26 DIAGNOSIS — R77.8 OTHER SPECIFIED ABNORMALITIES OF PLASMA PROTEINS: ICD-10-CM

## 2021-09-26 DIAGNOSIS — I16.1 HYPERTENSIVE EMERGENCY: ICD-10-CM

## 2021-09-26 DIAGNOSIS — I10 ESSENTIAL (PRIMARY) HYPERTENSION: ICD-10-CM

## 2021-09-26 DIAGNOSIS — E11.9 TYPE 2 DIABETES MELLITUS WITHOUT COMPLICATIONS: ICD-10-CM

## 2021-09-26 DIAGNOSIS — I50.1 LEFT VENTRICULAR FAILURE, UNSPECIFIED: ICD-10-CM

## 2021-09-26 DIAGNOSIS — N18.9 CHRONIC KIDNEY DISEASE, UNSPECIFIED: ICD-10-CM

## 2021-09-26 DIAGNOSIS — E87.70 FLUID OVERLOAD, UNSPECIFIED: ICD-10-CM

## 2021-09-26 DIAGNOSIS — J81.0 ACUTE PULMONARY EDEMA: ICD-10-CM

## 2021-09-26 LAB
ALBUMIN SERPL ELPH-MCNC: 3.4 G/DL — LOW (ref 3.5–5)
ALBUMIN SERPL ELPH-MCNC: 3.5 G/DL — SIGNIFICANT CHANGE UP (ref 3.5–5)
ALP SERPL-CCNC: 102 U/L — SIGNIFICANT CHANGE UP (ref 40–120)
ALP SERPL-CCNC: 102 U/L — SIGNIFICANT CHANGE UP (ref 40–120)
ALT FLD-CCNC: 18 U/L DA — SIGNIFICANT CHANGE UP (ref 10–60)
ALT FLD-CCNC: 23 U/L DA — SIGNIFICANT CHANGE UP (ref 10–60)
ANION GAP SERPL CALC-SCNC: 12 MMOL/L — SIGNIFICANT CHANGE UP (ref 5–17)
ANION GAP SERPL CALC-SCNC: 4 MMOL/L — LOW (ref 5–17)
APTT BLD: 72.1 SEC — HIGH (ref 27.5–35.5)
AST SERPL-CCNC: 21 U/L — SIGNIFICANT CHANGE UP (ref 10–40)
AST SERPL-CCNC: 54 U/L — HIGH (ref 10–40)
BASOPHILS # BLD AUTO: 0.06 K/UL — SIGNIFICANT CHANGE UP (ref 0–0.2)
BASOPHILS NFR BLD AUTO: 0.5 % — SIGNIFICANT CHANGE UP (ref 0–2)
BILIRUB SERPL-MCNC: 0.2 MG/DL — SIGNIFICANT CHANGE UP (ref 0.2–1.2)
BILIRUB SERPL-MCNC: 0.4 MG/DL — SIGNIFICANT CHANGE UP (ref 0.2–1.2)
BUN SERPL-MCNC: 25 MG/DL — HIGH (ref 7–18)
BUN SERPL-MCNC: 29 MG/DL — HIGH (ref 7–18)
CALCIUM SERPL-MCNC: 9 MG/DL — SIGNIFICANT CHANGE UP (ref 8.4–10.5)
CALCIUM SERPL-MCNC: 9.4 MG/DL — SIGNIFICANT CHANGE UP (ref 8.4–10.5)
CHLORIDE SERPL-SCNC: 100 MMOL/L — SIGNIFICANT CHANGE UP (ref 96–108)
CHLORIDE SERPL-SCNC: 107 MMOL/L — SIGNIFICANT CHANGE UP (ref 96–108)
CO2 SERPL-SCNC: 27 MMOL/L — SIGNIFICANT CHANGE UP (ref 22–31)
CO2 SERPL-SCNC: 27 MMOL/L — SIGNIFICANT CHANGE UP (ref 22–31)
CREAT SERPL-MCNC: 1.02 MG/DL — SIGNIFICANT CHANGE UP (ref 0.5–1.3)
CREAT SERPL-MCNC: 1.25 MG/DL — SIGNIFICANT CHANGE UP (ref 0.5–1.3)
D DIMER BLD IA.RAPID-MCNC: 227 NG/ML DDU — SIGNIFICANT CHANGE UP
EOSINOPHIL # BLD AUTO: 0.15 K/UL — SIGNIFICANT CHANGE UP (ref 0–0.5)
EOSINOPHIL NFR BLD AUTO: 1.3 % — SIGNIFICANT CHANGE UP (ref 0–6)
GLUCOSE BLDC GLUCOMTR-MCNC: 239 MG/DL — HIGH (ref 70–99)
GLUCOSE BLDC GLUCOMTR-MCNC: 274 MG/DL — HIGH (ref 70–99)
GLUCOSE BLDC GLUCOMTR-MCNC: 276 MG/DL — HIGH (ref 70–99)
GLUCOSE SERPL-MCNC: 183 MG/DL — HIGH (ref 70–99)
GLUCOSE SERPL-MCNC: 375 MG/DL — HIGH (ref 70–99)
HCT VFR BLD CALC: 36 % — SIGNIFICANT CHANGE UP (ref 34.5–45)
HCT VFR BLD CALC: 36.4 % — SIGNIFICANT CHANGE UP (ref 34.5–45)
HCT VFR BLD CALC: 38 % — SIGNIFICANT CHANGE UP (ref 34.5–45)
HGB BLD-MCNC: 11.7 G/DL — SIGNIFICANT CHANGE UP (ref 11.5–15.5)
HGB BLD-MCNC: 12.2 G/DL — SIGNIFICANT CHANGE UP (ref 11.5–15.5)
HGB BLD-MCNC: 12.8 G/DL — SIGNIFICANT CHANGE UP (ref 11.5–15.5)
IMM GRANULOCYTES NFR BLD AUTO: 0.3 % — SIGNIFICANT CHANGE UP (ref 0–1.5)
LYMPHOCYTES # BLD AUTO: 29.1 % — SIGNIFICANT CHANGE UP (ref 13–44)
LYMPHOCYTES # BLD AUTO: 3.43 K/UL — HIGH (ref 1–3.3)
MCHC RBC-ENTMCNC: 29.1 PG — SIGNIFICANT CHANGE UP (ref 27–34)
MCHC RBC-ENTMCNC: 29.5 PG — SIGNIFICANT CHANGE UP (ref 27–34)
MCHC RBC-ENTMCNC: 29.5 PG — SIGNIFICANT CHANGE UP (ref 27–34)
MCHC RBC-ENTMCNC: 32.5 GM/DL — SIGNIFICANT CHANGE UP (ref 32–36)
MCHC RBC-ENTMCNC: 33.5 GM/DL — SIGNIFICANT CHANGE UP (ref 32–36)
MCHC RBC-ENTMCNC: 33.7 GM/DL — SIGNIFICANT CHANGE UP (ref 32–36)
MCV RBC AUTO: 87.6 FL — SIGNIFICANT CHANGE UP (ref 80–100)
MCV RBC AUTO: 88.1 FL — SIGNIFICANT CHANGE UP (ref 80–100)
MCV RBC AUTO: 89.6 FL — SIGNIFICANT CHANGE UP (ref 80–100)
MONOCYTES # BLD AUTO: 0.63 K/UL — SIGNIFICANT CHANGE UP (ref 0–0.9)
MONOCYTES NFR BLD AUTO: 5.3 % — SIGNIFICANT CHANGE UP (ref 2–14)
NEUTROPHILS # BLD AUTO: 7.48 K/UL — HIGH (ref 1.8–7.4)
NEUTROPHILS NFR BLD AUTO: 63.5 % — SIGNIFICANT CHANGE UP (ref 43–77)
NRBC # BLD: 0 /100 WBCS — SIGNIFICANT CHANGE UP (ref 0–0)
NT-PROBNP SERPL-SCNC: 4670 PG/ML — HIGH (ref 0–125)
PLATELET # BLD AUTO: 302 K/UL — SIGNIFICANT CHANGE UP (ref 150–400)
PLATELET # BLD AUTO: 306 K/UL — SIGNIFICANT CHANGE UP (ref 150–400)
PLATELET # BLD AUTO: 329 K/UL — SIGNIFICANT CHANGE UP (ref 150–400)
POTASSIUM SERPL-MCNC: 4.2 MMOL/L — SIGNIFICANT CHANGE UP (ref 3.5–5.3)
POTASSIUM SERPL-MCNC: 4.5 MMOL/L — SIGNIFICANT CHANGE UP (ref 3.5–5.3)
POTASSIUM SERPL-SCNC: 4.2 MMOL/L — SIGNIFICANT CHANGE UP (ref 3.5–5.3)
POTASSIUM SERPL-SCNC: 4.5 MMOL/L — SIGNIFICANT CHANGE UP (ref 3.5–5.3)
PROT SERPL-MCNC: 7.9 G/DL — SIGNIFICANT CHANGE UP (ref 6–8.3)
PROT SERPL-MCNC: 8.2 G/DL — SIGNIFICANT CHANGE UP (ref 6–8.3)
RAPID RVP RESULT: SIGNIFICANT CHANGE UP
RBC # BLD: 4.02 M/UL — SIGNIFICANT CHANGE UP (ref 3.8–5.2)
RBC # BLD: 4.13 M/UL — SIGNIFICANT CHANGE UP (ref 3.8–5.2)
RBC # BLD: 4.34 M/UL — SIGNIFICANT CHANGE UP (ref 3.8–5.2)
RBC # FLD: 12.7 % — SIGNIFICANT CHANGE UP (ref 10.3–14.5)
RBC # FLD: 12.9 % — SIGNIFICANT CHANGE UP (ref 10.3–14.5)
RBC # FLD: 12.9 % — SIGNIFICANT CHANGE UP (ref 10.3–14.5)
SARS-COV-2 RNA SPEC QL NAA+PROBE: SIGNIFICANT CHANGE UP
SODIUM SERPL-SCNC: 138 MMOL/L — SIGNIFICANT CHANGE UP (ref 135–145)
SODIUM SERPL-SCNC: 139 MMOL/L — SIGNIFICANT CHANGE UP (ref 135–145)
TROPONIN I SERPL-MCNC: 0.52 NG/ML — HIGH (ref 0–0.04)
TROPONIN I SERPL-MCNC: 17.2 NG/ML — HIGH (ref 0–0.04)
TROPONIN I SERPL-MCNC: 19.7 NG/ML — HIGH (ref 0–0.04)
WBC # BLD: 11.79 K/UL — HIGH (ref 3.8–10.5)
WBC # BLD: 8.3 K/UL — SIGNIFICANT CHANGE UP (ref 3.8–10.5)
WBC # BLD: 9.89 K/UL — SIGNIFICANT CHANGE UP (ref 3.8–10.5)
WBC # FLD AUTO: 11.79 K/UL — HIGH (ref 3.8–10.5)
WBC # FLD AUTO: 8.3 K/UL — SIGNIFICANT CHANGE UP (ref 3.8–10.5)
WBC # FLD AUTO: 9.89 K/UL — SIGNIFICANT CHANGE UP (ref 3.8–10.5)

## 2021-09-26 PROCEDURE — 12345: CPT | Mod: NC

## 2021-09-26 PROCEDURE — 71045 X-RAY EXAM CHEST 1 VIEW: CPT | Mod: 26

## 2021-09-26 PROCEDURE — 93010 ELECTROCARDIOGRAM REPORT: CPT | Mod: 76

## 2021-09-26 PROCEDURE — 99291 CRITICAL CARE FIRST HOUR: CPT

## 2021-09-26 PROCEDURE — 99223 1ST HOSP IP/OBS HIGH 75: CPT

## 2021-09-26 RX ORDER — ENOXAPARIN SODIUM 100 MG/ML
40 INJECTION SUBCUTANEOUS DAILY
Refills: 0 | Status: DISCONTINUED | OUTPATIENT
Start: 2021-09-26 | End: 2021-09-26

## 2021-09-26 RX ORDER — HYDROCHLOROTHIAZIDE 25 MG
12.5 TABLET ORAL DAILY
Refills: 0 | Status: DISCONTINUED | OUTPATIENT
Start: 2021-09-26 | End: 2021-09-26

## 2021-09-26 RX ORDER — LOSARTAN POTASSIUM 100 MG/1
50 TABLET, FILM COATED ORAL
Refills: 0 | Status: DISCONTINUED | OUTPATIENT
Start: 2021-09-26 | End: 2021-09-27

## 2021-09-26 RX ORDER — PANTOPRAZOLE SODIUM 20 MG/1
40 TABLET, DELAYED RELEASE ORAL
Refills: 0 | Status: DISCONTINUED | OUTPATIENT
Start: 2021-09-26 | End: 2021-09-27

## 2021-09-26 RX ORDER — FUROSEMIDE 40 MG
40 TABLET ORAL
Refills: 0 | Status: DISCONTINUED | OUTPATIENT
Start: 2021-09-26 | End: 2021-09-26

## 2021-09-26 RX ORDER — LOSARTAN POTASSIUM 100 MG/1
50 TABLET, FILM COATED ORAL DAILY
Refills: 0 | Status: DISCONTINUED | OUTPATIENT
Start: 2021-09-26 | End: 2021-09-26

## 2021-09-26 RX ORDER — ATORVASTATIN CALCIUM 80 MG/1
20 TABLET, FILM COATED ORAL AT BEDTIME
Refills: 0 | Status: DISCONTINUED | OUTPATIENT
Start: 2021-09-26 | End: 2021-09-27

## 2021-09-26 RX ORDER — IPRATROPIUM/ALBUTEROL SULFATE 18-103MCG
3 AEROSOL WITH ADAPTER (GRAM) INHALATION ONCE
Refills: 0 | Status: COMPLETED | OUTPATIENT
Start: 2021-09-26 | End: 2021-09-26

## 2021-09-26 RX ORDER — CARVEDILOL PHOSPHATE 80 MG/1
25 CAPSULE, EXTENDED RELEASE ORAL EVERY 12 HOURS
Refills: 0 | Status: DISCONTINUED | OUTPATIENT
Start: 2021-09-26 | End: 2021-09-27

## 2021-09-26 RX ORDER — FUROSEMIDE 40 MG
40 TABLET ORAL DAILY
Refills: 0 | Status: DISCONTINUED | OUTPATIENT
Start: 2021-09-26 | End: 2021-09-27

## 2021-09-26 RX ORDER — FUROSEMIDE 40 MG
80 TABLET ORAL ONCE
Refills: 0 | Status: COMPLETED | OUTPATIENT
Start: 2021-09-26 | End: 2021-09-26

## 2021-09-26 RX ORDER — INSULIN LISPRO 100/ML
VIAL (ML) SUBCUTANEOUS
Refills: 0 | Status: DISCONTINUED | OUTPATIENT
Start: 2021-09-26 | End: 2021-09-27

## 2021-09-26 RX ORDER — INSULIN LISPRO 100/ML
VIAL (ML) SUBCUTANEOUS AT BEDTIME
Refills: 0 | Status: DISCONTINUED | OUTPATIENT
Start: 2021-09-26 | End: 2021-09-27

## 2021-09-26 RX ORDER — HEPARIN SODIUM 5000 [USP'U]/ML
INJECTION INTRAVENOUS; SUBCUTANEOUS
Qty: 25000 | Refills: 0 | Status: DISCONTINUED | OUTPATIENT
Start: 2021-09-26 | End: 2021-09-27

## 2021-09-26 RX ORDER — ASPIRIN/CALCIUM CARB/MAGNESIUM 324 MG
81 TABLET ORAL DAILY
Refills: 0 | Status: DISCONTINUED | OUTPATIENT
Start: 2021-09-26 | End: 2021-09-27

## 2021-09-26 RX ADMIN — Medication 80 MILLIGRAM(S): at 03:30

## 2021-09-26 RX ADMIN — CARVEDILOL PHOSPHATE 25 MILLIGRAM(S): 80 CAPSULE, EXTENDED RELEASE ORAL at 17:29

## 2021-09-26 RX ADMIN — Medication 12.5 MILLIGRAM(S): at 14:23

## 2021-09-26 RX ADMIN — HEPARIN SODIUM 700 UNIT(S)/HR: 5000 INJECTION INTRAVENOUS; SUBCUTANEOUS at 12:48

## 2021-09-26 RX ADMIN — Medication 81 MILLIGRAM(S): at 12:54

## 2021-09-26 RX ADMIN — LOSARTAN POTASSIUM 50 MILLIGRAM(S): 100 TABLET, FILM COATED ORAL at 17:29

## 2021-09-26 RX ADMIN — Medication 3: at 17:33

## 2021-09-26 RX ADMIN — Medication 3 MILLILITER(S): at 03:15

## 2021-09-26 RX ADMIN — HEPARIN SODIUM 700 UNIT(S)/HR: 5000 INJECTION INTRAVENOUS; SUBCUTANEOUS at 19:19

## 2021-09-26 RX ADMIN — Medication 3: at 14:21

## 2021-09-26 RX ADMIN — ATORVASTATIN CALCIUM 20 MILLIGRAM(S): 80 TABLET, FILM COATED ORAL at 21:22

## 2021-09-26 RX ADMIN — PANTOPRAZOLE SODIUM 40 MILLIGRAM(S): 20 TABLET, DELAYED RELEASE ORAL at 12:54

## 2021-09-26 NOTE — H&P ADULT - ATTENDING COMMENTS
Pt seen and examined.  Case discussed with MAR  71 year old woman with PMH of DM2, HTN brought in to the ED on account of acute onset SOB and chest pain with left arm radiation. NO associated symptoms.    In the ED, she was in respiratory distress with tachypnea and very high BP. She was placed on BiPAP machine for this respiratory distress and findings on CXR c/w pulmonary edema     Vital Signs Last 24 Hrs  T(C): 36.6 (26 Sep 2021 05:41), Max: 36.6 (26 Sep 2021 05:41)  T(F): 97.8 (26 Sep 2021 05:41), Max: 97.8 (26 Sep 2021 05:41)  HR: 76 (26 Sep 2021 05:41) (76 - 100)  BP: 150/80 (26 Sep 2021 05:41) (150/80 - 176/91)  RR: 20 (26 Sep 2021 05:41) (20 - 24)  SpO2: 96% (26 Sep 2021 05:41) (95% - 100%)    Labs                         11.7   11.79 )-----------( 302      ( 26 Sep 2021 03:23 )             36.0     09-26    138  |  107  |  25<H>  ----------------------------<  183<H>  4.2   |  27  |  1.02    Ca    9.0      26 Sep 2021 03:23    TPro  7.9  /  Alb  3.5  /  TBili  0.2  /  DBili  x   /  AST  21  /  ALT  18  /  AlkPhos  102  09-26    CARDIAC MARKERS ( 26 Sep 2021 03:23 )  0.515 ng/mL / x     / x     / x     / x        pro BNP - 4670    CXR - Bilateral lung interstitial infiltrates c/w B/L pulmonary vascular congestion.  EKG  -   Sinus tachy; LVH with LAD    Impression  71 year old woman with hx significant for suboptimally controlled HTN presenting with clinical and radiological evidence of acute pulmonary edema that appears related  to hypertensive emergency with reactive troponinemia vs NSTEMI.    A/P   - Acute pulmonary edema with respiratory distress  - Hypertensive emergency   - Elevated troponin - reactive demand ischemia vs NSTEMI   - Uncontrolled HTN    Plan   s/p urgent IV diuresis and short term NIPPV with BiPAP  S/p ICU evaluation  Improvement in symptoms and BP level   Pt stable off NIPPV and O2 supplements  Monitor vital signs and SaO2  Serial troponin / EKG / 2D ECHO   Resume BP meds especially preload reduction Pt seen and examined.  Case discussed with MAR  71 year old woman with PMH of DM2, HTN brought in to the ED on account of acute onset SOB and chest pain with left arm radiation. NO associated symptoms.    In the ED, she was in respiratory distress with tachypnea and very high BP. She was placed on BiPAP machine for this respiratory distress and findings on CXR c/w pulmonary edema     Vital Signs Last 24 Hrs  T(C): 36.6 (26 Sep 2021 05:41), Max: 36.6 (26 Sep 2021 05:41)  T(F): 97.8 (26 Sep 2021 05:41), Max: 97.8 (26 Sep 2021 05:41)  HR: 76 (26 Sep 2021 05:41) (76 - 100)  BP: 150/80 (26 Sep 2021 05:41) (150/80 - 176/91)  RR: 20 (26 Sep 2021 05:41) (20 - 24)  SpO2: 96% (26 Sep 2021 05:41) (95% - 100%)    Elderly woman, NAD presently, AAO X 3. No O2 supplement with appreciable SaO2  CTA B/L,  RRR S1S2 only  Soft NT ND BS +   No pedal edema  No focal deficits        Labs                         11.7   11.79 )-----------( 302      ( 26 Sep 2021 03:23 )             36.0     09-26    138  |  107  |  25<H>  ----------------------------<  183<H>  4.2   |  27  |  1.02    Ca    9.0      26 Sep 2021 03:23    TPro  7.9  /  Alb  3.5  /  TBili  0.2  /  DBili  x   /  AST  21  /  ALT  18  /  AlkPhos  102  09-26    CARDIAC MARKERS ( 26 Sep 2021 03:23 )  0.515 ng/mL / x     / x     / x     / x        pro BNP - 4670    CXR - Bilateral lung interstitial infiltrates c/w B/L pulmonary vascular congestion.  EKG  -   Sinus tachy; LVH with LAD    Impression  71 year old woman with hx significant for suboptimally controlled HTN presenting with clinical and radiological evidence of acute pulmonary edema that appears related  to hypertensive emergency with reactive troponinemia vs NSTEMI.    A/P   - Acute pulmonary edema with respiratory distress  - Hypertensive emergency   - Elevated troponin - reactive demand ischemia vs NSTEMI   - Uncontrolled HTN  - DM2 - last A1c was 7.2    Plan   s/p urgent IV diuresis and short term NIPPV with BiPAP  S/p ICU evaluation  Improvement in symptoms and BP level   Pt stable off NIPPV and O2 supplements  Monitor vital signs and SaO2  Serial troponin / EKG / 2D ECHO   Resume BP meds especially afterload reduction  ECHO this AM  Cardiology consult  Pt on glipizide and janumet at home; place on low dose nightly long acting insulin and hold OHA

## 2021-09-26 NOTE — ED PROVIDER NOTE - MUSCULOSKELETAL, MLM
Spine appears normal, range of motion is not limited, no muscle or joint tenderness. No swelling to the lower extremities.

## 2021-09-26 NOTE — CHART NOTE - NSCHARTNOTEFT_GEN_A_CORE
Patient seen and examined. Case discussed with Dr. Lopez of cardiology as well as housestaff.        Patient reports feeling well at time of my exam but previously with chest pain and SOB requiring BIPAP. Troponin 2 has greatly increased from 0.515 to 17.2 and given symptoms concern for NSTEMI Type 1. As d/w Dr. Lopez, will start heparin gtt, continue Asa 81mg, Coreg 25mg po BID, and increase losartan to 50mg po BID. Continue Lasix but decrease to once daily dosing and dc HCTZ while patient is on Lasix. Continue tele monitoring. Plan to transfer to [facility] in the AM for cardiac cath.    Plan d/w patient and daughter at bedside.    [facility] Attg. Patient seen and examined. Case discussed with Dr. Lopez of cardiology as well as housestaff.        Patient reports feeling well at time of my exam but previously with chest pain and SOB requiring BIPAP. Troponin 2 has greatly increased from 0.515 to 17.2 and given symptoms concern for NSTEMI Type 1. As d/w Dr. Lopez, will start heparin gtt, continue Asa 81mg, Coreg 25mg po BID, and increase losartan to 50mg po BID. Continue Lasix but decrease to once daily dosing and dc HCTZ while patient is on Lasix. Continue tele monitoring. Trend troponins until they peak. Plan to transfer to [facility] in the AM for cardiac cath.    Plan d/w patient and daughter at bedside.    [facility] Attg.

## 2021-09-26 NOTE — ED ADULT NURSE NOTE - NSIMPLEMENTINTERV_GEN_ALL_ED
,DirectAddress_Unknown
Implemented All Universal Safety Interventions:  [place] to call system. Call bell, personal items and telephone within reach. Instruct patient to call for assistance. Room bathroom lighting operational. Non-slip footwear when patient is off stretcher. Physically safe environment: no spills, clutter or unnecessary equipment. Stretcher in lowest position, wheels locked, appropriate side rails in place.

## 2021-09-26 NOTE — CONSULT NOTE ADULT - ASSESSMENT
71 year old female with medical history of HTN and DM presented to ED with worsening shortness of breath and chest discomfort. ICU was consulted for first time BIPAP and flash pulmonary edema.    Assessment:    -Hypoxic respiratory Failure ( resolved)  -Pulmonary edema  -Elevated trop  -Hypertension.  -Diabetes  -Elevated ProBNP  -CKD      Pulmonary edema:    Patient noted to have Pulmonary edema on CXR likely secondary  to Hypertensive emergency VS new CHF ( less likely)  was placed on BIPAP for increased work of breathing.   s/p lasix 80IV in ED. Patients BP noted to be improving.  Saturating well on room air.  Follow ECHO       Elevated trop:    Patients trop noted to be 0.5  EKG showed sinus tach with left axis deviation.  likely secondary to demand ischemia in setting of pulm edema.  follow repeat trop.   Follow ECHO  reports negative stress test in may 2021.  continue with beta blocker, Lipitor  Will add aspirin.      HTN:    Although patients SBP documented as 176/91, suspected HTN emergency as CXR showed flash pulmonary edema.  Patient takes carvedilol, losartan, HCTZ at home, was also prescribed amlodipine 10mg but was not taking as she thought it gives her palpitations.      Diabetes:    Patient on oral Hypoglycemics at home.   continue with sliding scale.   71 year old female with medical history of HTN and DM presented to ED with worsening shortness of breath and chest discomfort. ICU was consulted for first time BIPAP and flash pulmonary edema.    Assessment:    -Pulmonary edema  -Elevated troponin   -Hypertension.  -Diabetes  -Elevated ProBNP  -CKD      Pulmonary edema:    Patient noted to have Pulmonary edema on CXR likely secondary  to Hypertensive emergency VS new CHF  was placed on BIPAP for increased work of breathing.   s/p lasix 80IV in ED. Patients BP noted to be improving.  Saturating well on room air.  Follow ECHO .      Elevated trop:    Patients trop noted to be 0.5  EKG showed sinus tach with left axis deviation.  likely secondary to demand ischemia in setting of pulm edema.  follow repeat trop.   Follow ECHO  reports negative stress test in may 2021.  continue with beta blocker, Lipitor and aspirin.      HTN:    Although patients SBP documented as 176/91, suspected HTN emergency as CXR showed flash pulmonary edema.  Patient takes carvedilol, losartan, HCTZ at home, was also prescribed amlodipine 10mg but was not taking as she thought it gives her palpitations.  Resume home meds.    Diabetes:    Patient on oral Hypoglycemics at home.   continue with sliding scale.      Patient not accepted to ICU , as patients clinical condition improved. Reconsult if needed.

## 2021-09-26 NOTE — ED PROVIDER NOTE - PRO INTERPRETER NEED 2
PATIENT: Cristal Guthrie   DATE OF SERVICE: 9/15/2021   : 1984 MRN: [id number]     Subjective     Chief Complaint   Patient presents with   • Other     possible mosquito bite on LT lower leg causing redness x yesterday. Hx of MRSA so is concerned.       ANA MARIA Tavares is a 37 year old female who presents to the [facility] alone for possible infection of insect bite to the L lower leg.    This is a new problem. Episode onset: 3 days ago. The problem has been waxing and waning since onset. Initial bite 3 days ago w/ mild localized and itching. Then yesterday noted worsening redness, swelling, tenderness, and draining pustule. Endorsing scant amt of draining that spontaneously resolved. Pt applied warm compresses to the area w/ improvement in redness, swelling, and tenderness. Denies anorexia, congestion, cough, diarrhea, eye pain, facial edema, fatigue, fever, joint pain, nail changes, rhinorrhea, shortness of breath, sore throat or vomiting. PMH significant for cutaneous MRSA infection.      ALLERGIES:  No Known Allergies    MEDICATIONS:  Current Outpatient Medications   Medication Sig   • albuterol (PROAIR RESPICLICK) 108 (90 Base) MCG/ACT inhaler Inhale 2 puffs into the lungs every 4 hours as needed for Shortness of Breath or Wheezing.     No current facility-administered medications for this visit.       Most Recent Immunizations   Administered Date(s) Administered   • COVID-19 Pfizer-[employer] 2021   • Influenza, injectable, quadrivalent, preservative-free 09/15/2020   • Tdap 2016     ACTIVE PROBLEM LIST:  Patient Active Problem List   Diagnosis   • Asthma   • PCOS (polycystic ovarian syndrome)       Past Medical History:   Diagnosis Date   • RAD (reactive airway disease)        History reviewed. No pertinent surgical history.    Family History   Problem Relation Age of Onset   • Hypertension Mother    • Hyperlipidemia Father        Social History     Tobacco Use   • Smoking status: Never Smoker    • Smokeless tobacco: Never Used   Substance Use Topics   • Alcohol use: No   • Drug use: Never       Patient's medications, allergies, past medical, surgical, and social history were reviewed and updated as appropriate.    Review of Systems   Constitutional: Negative for fatigue and fever.   HENT: Negative for congestion, rhinorrhea and sore throat.    Eyes: Negative for pain.   Respiratory: Negative for cough and shortness of breath.    Gastrointestinal: Negative for anorexia, diarrhea and vomiting.   Musculoskeletal: Negative for joint pain.   Skin: Positive for rash. Negative for nail changes.     12 pt ROS performed. All systems reviewed and neg except HPI. Refer to HPI.    Objective   Visit Vitals  /71 (BP Location: LUE - Left upper extremity, Patient Position: Sitting, Cuff Size: Regular)   Pulse 76   Temp 97.7 °F (36.5 °C) (Oral)   Resp 16   LMP 07/01/2021   SpO2 97%     Physical Exam  Vitals and nursing note reviewed.   Constitutional:       General: She is awake. She is not in acute distress.     Appearance: Normal appearance. She is not ill-appearing, toxic-appearing or diaphoretic.   Pulmonary:      Effort: Pulmonary effort is normal. No respiratory distress.   Musculoskeletal:      Cervical back: Normal range of motion.   Skin:     General: Skin is warm and dry.      Capillary Refill: Capillary refill takes less than 2 seconds.      Comments: Single papulopustular lesion (<1 mm diameter) to the anterior L lower leg w/ localized 1 cm area of moderate erythema and mild superficial cutaneous edema, warmth, and tenderness.   Neurological:      General: No focal deficit present.      Mental Status: She is alert and oriented to person, place, and time.   Psychiatric:         Mood and Affect: Mood normal.         Behavior: Behavior normal. Behavior is cooperative.         Thought Content: Thought content normal.         Judgment: Judgment normal.         Vitals:    09/15/21 1136   BP: 117/71   BP  Location: LUE - Left upper extremity   Patient Position: Sitting   Cuff Size: Regular   Pulse: 76   Resp: 16   Temp: 97.7 °F (36.5 °C)   TempSrc: Oral   SpO2: 97%   LMP: 07/01/2021       Results for orders placed or performed in visit on 09/15/21   POCT URINE PREGNANCY   Result Value Ref Range    URINE PREGNANCY,QUAL Negative Negative    Internal Procedural Controls Acceptable Yes        Assessment /Plan     Problem List Items Addressed This Visit     None      Visit Diagnoses     Insect bite of left lower leg with infection, initial encounter    -  Primary    Relevant Medications    sulfamethoxazole-trimethoprim (BACTRIM DS) 800-160 MG per tablet    Last menstrual period (LMP) > 10 days ago        Relevant Orders    POCT URINE PREGNANCY        -medication(s) as prescribed and/or directed in AVS  -home/symptomatic care as discussed/outlined in AVS  -f/u w/ PCP and/or ICC as discussed/outlined in AVS  -ED precautions as discussed/outlined in AVS    Additional instructions provided as documented in the AVS.    Reviewed and discussed exam findings with pt. Plan for disposition and home care reviewed/discussed as well. Pt understands need for outpatient re-evaluation and follow up. Pt will continue to monitor and seek immediate medical attention for new, worsening, or concerning symptoms.   English

## 2021-09-26 NOTE — H&P ADULT - PROBLEM SELECTOR PLAN 4
Patient on oral Hypoglycemics at home.   continue with sliding scale. Although patients SBP documented as 176/91, suspected HTN emergency as CXR showed flash pulmonary edema.  Patient takes carvedilol, losartan, HCTZ at home, was also prescribed amlodipine 10mg but was not taking as she thought it gives her palpitations.  Continue home medications except amlodipine  Monitor BP

## 2021-09-26 NOTE — H&P ADULT - HISTORY OF PRESENT ILLNESS
71 year old female with medical history of HTN and DM presented to ED with worsening shortness of breath and chest discomfort. Patient was accompanied by daughter at bed side who is NP by profession. Patient sates that she was not taking amlodipine because she feels like she has palpitations, other than she took all other meds including carvedilol losartan and HCTZ. Patient added that worsening dyspnea over night left side chest discomfort 6/10 radiating to left arm. Patient denies any palpitations nausea, numbness tingling bowel or urinary problems. Patient added that she had negative stress test this year in may 2021.      ER course:  Patient Blood pressure noted to be 176/91 and saturations noted to be 95% on room air, Patient had increased work of breathing and was placed on BIPAP, ICU was consulted for first time BIPAP. CXR showed increased vascular congestion ( flash pulm edema) she was given 80 IV lasix and 125 mg IV solumedrol in ED. Patients breathing improved . BIPAP was weaned off , Patient was saturating 100% on room air, RR noted to 18.

## 2021-09-26 NOTE — H&P ADULT - PROBLEM SELECTOR PLAN 1
Presented with SOB  likely CHF exacerbation  ProBNP 4K  CXR: pulmonary edema   S/p BiPAP, 80mg IV lasix and 125mg Solumedrol in ED for increased work of breathing   Will start on Lasix 40IV BID  Follow Echo  Cards consulted Presented with SOB  likely CHF exacerbation  ProBNP 4K  CXR: pulmonary edema   S/p BiPAP, 80mg IV lasix and 125mg Solumedrol in ED for increased work of breathing   Will start on Lasix 40IV BID  Follow Echo  Cards DR. Lopez consulted Presented with SOB  Pulmonary edema 2/2 hypertensive emergency vs CHF exacerbation  ProBNP 4K  CXR: pulmonary edema   S/p BiPAP, 80mg IV lasix and 125mg Solumedrol in ED for increased work of breathing   Will start on Lasix 40IV BID  Follow Echo  Cards DR. Lopez consulted

## 2021-09-26 NOTE — H&P ADULT - PROBLEM SELECTOR PLAN 5
IMPROVE VTE Individual Risk Assessment  RISK                                                                Points  [  ] Previous VTE                                                  3  [  ] Thrombophilia                                               2  [  ] Lower limb paralysis                                      2        (unable to hold up >15 seconds)    [  ] Current Cancer                                              2         (within 6 months)  [x  ] Immobilization > 24 hrs                                1  [  ] ICU/CCU stay > 24 hours                              1  [x  ] Age > 60                                                      1  IMPROVE VTE Score _________2, -- for DVT proph  Lovenox Patient on oral Hypoglycemics at home.   continue with sliding scale.

## 2021-09-26 NOTE — CONSULT NOTE ADULT - SUBJECTIVE AND OBJECTIVE BOX
Patient is a 71y old  Female who presents with a chief complaint of     HPI: 71 year old female with medical history of HTN and DM presented to ED with worsening shortness of breath and chest discomfort. Patient was accompanied by daughter at bed side who is NP by profession. Patient sates that she was not taking amlodipine because she feels like she has palpitations, other than she took all other meds including carvedilol losartan and HCTZ. Patient added that worsening dyspnea over night left side chest discomfort 6/10 radiating to left arm. Patient denies any palpitations nausea, numbness tingling bowel or urinary problems. Patient added that she had negative stress test this year in may 2021.      ER course:  Patient Blood pressure noted to be 176/91 and saturations noted to be 95% on room air, Patient had increased work of breathing and was placed on BIPAP, ICU was consulted for first time BIPAP. CXR showed increased vascular congestion ( flash pulm edema) she was given 80 IV lasix and 125 mg IV solumedrol in ED. Patients breathing improved . BIPAP was weaned off , Patient was saturating 100% on room air, RR noted to 18.      PAST MEDICAL & SURGICAL HISTORY:  Diabetes    Hypertension    No significant past surgical history        SOCIAL HX:   Smoking                         ETOH                            Other    FAMILY HISTORY:  Family history of hypertension (Mother)    Family history of diabetes mellitus (Father)    Family history of coronary artery disease (Mother)    :  No known cardiovacular family hisotry     ROS:  See HPI     Allergies          Aleve (Rash)  lisinopril (Rash)    Intolerances          PHYSICAL EXAM    GENERAL: NAD  HEAD:  Atraumatic, Normocephalic  EYES: EOMI, PERRLA, conjunctiva and sclera clear  NECK: Supple, No JVD  CHEST/LUNG: crackles b/l, no wheezing, unlaboured breathing.  HEART: Regular rate and rhythm; No murmurs;   ABDOMEN: Soft, Nontender, Nondistended; Bowel sounds present; No guarding  EXTREMITIES:  2+ Peripheral Pulses, No cyanosis or edema  PSYCH:  Normal Affect  NEUROLOGY: non-focal, AAO X 3. Strength is 5/5. no sensory loss.  SKIN: No rashes or lesions    ICU Vital Signs Last 24 Hrs  T(C): 36.4 (26 Sep 2021 02:57), Max: 36.4 (26 Sep 2021 02:57)  T(F): 97.6 (26 Sep 2021 02:57), Max: 97.6 (26 Sep 2021 02:57)  HR: 87 (26 Sep 2021 04:16) (87 - 100)  BP: 176/91 (26 Sep 2021 02:57) (176/91 - 176/91)  BP(mean): --  ABP: --  ABP(mean): --  RR: 22 (26 Sep 2021 02:57) (22 - 22)  SpO2: 100% (26 Sep 2021 04:16) (95% - 100%)        LABS:                          11.7   11.79 )-----------( 302      ( 26 Sep 2021 03:23 )             36.0                                               09-26    138  |  107  |  25<H>  ----------------------------<  183<H>  4.2   |  27  |  1.02    Ca    9.0      26 Sep 2021 03:23    TPro  7.9  /  Alb  3.5  /  TBili  0.2  /  DBili  x   /  AST  21  /  ALT  18  /  AlkPhos  102  09-26      PT/INR - ( 26 Sep 2021 03:23 )   PT: 11.1 sec;   INR: 0.93 ratio                                                    CARDIAC MARKERS ( 26 Sep 2021 03:23 )  0.515 ng/mL / x     / x     / x     / x                                                LIVER FUNCTIONS - ( 26 Sep 2021 03:23 )  Alb: 3.5 g/dL / Pro: 7.9 g/dL / ALK PHOS: 102 U/L / ALT: 18 U/L DA / AST: 21 U/L / GGT: x                                                                                                                                       CXR:    ECHO:    MEDICATIONS  (STANDING):    MEDICATIONS  (PRN):        
CHIEF COMPLAINT:Patient is a 71y old  Female who presents with a chief complaint of Shortness of breath.      HPI:  71 year old female with medical history of HTN and DM presented to ED with worsening shortness of breath and chest discomfort. Patient was accompanied by daughter at bed side who is NP by profession. Patient sates that she was not taking amlodipine because she feels like she has palpitations, other than she took all other meds including carvedilol losartan and HCTZ. Patient added that worsening dyspnea over night left side chest discomfort 6/10 radiating to left arm. Patient denies any palpitations nausea, numbness tingling bowel or urinary problems. Patient added that she had negative stress test this year in may 2021.      ER course:  Patient Blood pressure noted to be 176/91 and saturations noted to be 95% on room air, Patient had increased work of breathing and was placed on BIPAP, ICU was consulted for first time BIPAP. CXR showed increased vascular congestion ( flash pulm edema) she was given 80 IV lasix and 125 mg IV solumedrol in ED. Patients breathing improved . BIPAP was weaned off , Patient was saturating 100% on room air, RR noted to 18.   (26 Sep 2021 05:52)      PAST MEDICAL & SURGICAL HISTORY:  Diabetes    Hypertension    Lipid D/O      MEDICATIONS  (STANDING):  aspirin  chewable 81 milliGRAM(s) Oral daily  atorvastatin 20 milliGRAM(s) Oral at bedtime  carvedilol 25 milliGRAM(s) Oral every 12 hours  enoxaparin Injectable 40 milliGRAM(s) SubCutaneous daily  furosemide   Injectable 40 milliGRAM(s) IV Push two times a day  insulin lispro (ADMELOG) corrective regimen sliding scale   SubCutaneous three times a day before meals  losartan 50 milliGRAM(s) Oral daily    MEDICATIONS  (PRN):      FAMILY HISTORY:  Family history of hypertension (Mother)    Family history of diabetes mellitus (Father)    Family history of coronary artery disease (Mother)        SOCIAL HISTORY:    [x ] Non-smoker    [x ] Alcohol-denies    Allergies    Aleve (Rash)  lisinopril (Rash)    Intolerances    	    REVIEW OF SYSTEMS:  CONSTITUTIONAL: No fever, weight loss, or fatigue  EYES: No eye pain, visual disturbances, or discharge  ENT:  No difficulty hearing, tinnitus, vertigo; No sinus or throat pain  NECK: No pain or stiffness  RESPIRATORY: No cough, wheezing, chills or hemoptysis; + Shortness of Breath  CARDIOVASCULAR: + chest pain,No  palpitations, passing out, dizziness, or leg swelling  GASTROINTESTINAL: No abdominal or epigastric pain. No nausea, vomiting, or hematemesis; No diarrhea or constipation. No melena or hematochezia.  GENITOURINARY: No dysuria, frequency, hematuria, or incontinence  NEUROLOGICAL: No headaches, memory loss, loss of strength, numbness, or tremors  SKIN: No itching, burning, rashes, or lesions   LYMPH Nodes: No enlarged glands  ENDOCRINE: No heat or cold intolerance; No hair loss  MUSCULOSKELETAL: No joint pain or swelling; No muscle, back, or extremity pain  PSYCHIATRIC: No depression, anxiety, mood swings, or difficulty sleeping  HEME/LYMPH: No easy bruising, or bleeding gums  ALLERGY AND IMMUNOLOGIC: No hives or eczema	      PHYSICAL EXAM:  T(C): 36.6 (09-26-21 @ 11:53), Max: 36.7 (09-26-21 @ 07:09)  HR: 86 (09-26-21 @ 11:53) (76 - 100)  BP: 174/85 (09-26-21 @ 11:53) (150/80 - 176/91)  RR: 20 (09-26-21 @ 11:53) (20 - 24)  SpO2: 99% (09-26-21 @ 11:53) (95% - 100%)  Wt(kg): --  I&O's Summary      Appearance: Normal	  HEENT:   Normal oral mucosa, PERRL, EOMI	  Lymphatic: No lymphadenopathy  Cardiovascular: Normal S1 S2, No JVD, No murmurs, No edema  Respiratory: Lungs clear to auscultation	  Psychiatry: A & O x 3, Mood & affect appropriate  Gastrointestinal:  Soft, Non-tender, + BS	  Skin: No rashes, No ecchymoses, No cyanosis	  Neurologic: Non-focal  Extremities: Normal range of motion, No clubbing, cyanosis or edema  Vascular: Peripheral pulses palpable 2+ bilaterally    	    ECG: NSR,LVH with st-t depression keysha-lateral leads    	  LABS:	 	    CARDIAC MARKERS ( 26 Sep 2021 10:35 )  17.200 ng/mL / x     / x     / x     / x      CARDIAC MARKERS ( 26 Sep 2021 03:23 )  0.515 ng/mL / x     / x     / x     / x                                  12.2   8.30  )-----------( 306      ( 26 Sep 2021 10:35 )             36.4     09-26    139  |  100  |  29<H>  ----------------------------<  375<H>  4.5   |  27  |  1.25    Ca    9.4      26 Sep 2021 10:35    TPro  8.2  /  Alb  3.4<L>  /  TBili  0.4  /  DBili  x   /  AST  54<H>  /  ALT  23  /  AlkPhos  102  09-26    proBNP: Serum Pro-Brain Natriuretic Peptide: 4670 pg/mL (09-26 @ 03:23)    rd< from: Xray Chest 1 View- PORTABLE-Urgent (Xray Chest 1 View- PORTABLE-Urgent .) (09.26.21 @ 03:41) >  EXAM:  XR CHEST PORTABLE URGENT 1V                            PROCEDURE DATE:  09/26/2021          INTERPRETATION:  Chest radiograph (one view)     CPT 27399    CLINICAL INFORMATION:  Patient is unable to communicate.  Short of breath.    TECHNIQUE:  Single frontal view of the chest was obtained.    FINDINGS:  No previous examinations are available for review.    The lungs demonstrate increased pulmonary vascular congestion. No pleural effusion is seen. There is questionable increased airspace opacity in the right lower lobe which may represent superimposed pneumonia. Calcified granuloma is seen in the left lower lobe. The heart and mediastinum appear intact.      IMPRESSION: Increased pulmonary vascular congestion noted. Questionable increased airspace opacity noted in the right lower lobe which may represent superimposed pneumonia. Clinical correlation and follow-up examination are recommended.    < end of copied text >

## 2021-09-26 NOTE — H&P ADULT - PROBLEM SELECTOR PLAN 3
Although patients SBP documented as 176/91, suspected HTN emergency as CXR showed flash pulmonary edema.  Patient takes carvedilol, losartan, HCTZ at home, was also prescribed amlodipine 10mg but was not taking as she thought it gives her palpitations.  Continue home medications Although patients SBP documented as 176/91, suspected HTN emergency as CXR showed flash pulmonary edema.  Patient takes carvedilol, losartan, HCTZ at home, was also prescribed amlodipine 10mg but was not taking as she thought it gives her palpitations.  Continue home medications except amlodipine Although patients SBP documented as 176/91, suspected HTN emergency as CXR showed flash pulmonary edema.  Patient takes carvedilol, losartan, HCTZ at home, was also prescribed amlodipine 10mg but was not taking as she thought it gives her palpitations.  Continue home medications except amlodipine  Monitor BP

## 2021-09-26 NOTE — ED PROVIDER NOTE - CLINICAL SUMMARY MEDICAL DECISION MAKING FREE TEXT BOX
Patient with chest pain and shortness of breath with bilateral rales on exam. Likely acute pulmonary edema. Will rule out PE vs. ACS vs. infectious etiology. Will check labs, perform chest x-ray, nebulizer, place on Bi-pap, and give nitro if persistently hypertensive.

## 2021-09-26 NOTE — CONSULT NOTE ADULT - ASSESSMENT
71 year old female with medical history of HTN and DM presented to ED with worsening shortness of breath and chest discomfort,NSTEM- TYpe I MI, pulmonary edema .  1.Tele monitoring  2.NSTEM-heparin drip,asa,b blocker,statin.CE until peak.  3.HTN-coreg,inc cozaar 50mg bid.  4.Echocardiogram.  5.DM-Insulin.  6.Lipid d/o-statin.  7.D/W Daughter and patient, recent neg stress test 5/21 with outpatient cardiologist, agree for cardiac cath in AM.  8.PPI.

## 2021-09-26 NOTE — H&P ADULT - ASSESSMENT
71 year old female with medical history of HTN and DM presented to ED with worsening shortness of breath and chest discomfort. Admitted for fluid overload

## 2021-09-26 NOTE — H&P ADULT - PROBLEM SELECTOR PLAN 6
IMPROVE VTE Individual Risk Assessment  RISK                                                                Points  [  ] Previous VTE                                                  3  [  ] Thrombophilia                                               2  [  ] Lower limb paralysis                                      2        (unable to hold up >15 seconds)    [  ] Current Cancer                                              2         (within 6 months)  [x  ] Immobilization > 24 hrs                                1  [  ] ICU/CCU stay > 24 hours                              1  [x  ] Age > 60                                                      1  IMPROVE VTE Score _________2, -- for DVT proph  Lovenox

## 2021-09-26 NOTE — ED PROVIDER NOTE - PROGRESS NOTE DETAILS
Dawkins: pt improve with bipap. speaking in full sentences. cxr consistent with APE. icu called. received nebs, lasix. icu rec to take off bipap and if no worsening sx can be monitor on floor.

## 2021-09-26 NOTE — H&P ADULT - PROBLEM SELECTOR PLAN 2
Likely demand ischemia  T1 0.5, Follow T2 and T3  continue with beta blocker, Lipitor  Will start aspirin   Cards consulted Likely demand ischemia  T1 0.5, Follow T2 and T3  Admit to tele  continue with beta blocker, Lipitor  Will start aspirin   Cards consulted Likely demand ischemia  T1 0.5, Follow T2 and T3  Admit to tele  continue with beta blocker, Lipitor  Will start aspirin   Cards Dr. Lopez consulted

## 2021-09-26 NOTE — ED PROVIDER NOTE - NSICDXFAMILYHX_GEN_ALL_CORE_FT
FAMILY HISTORY:  Father  Still living? Unknown  Family history of diabetes mellitus, Age at diagnosis: Age Unknown    Mother  Still living? Unknown  Family history of coronary artery disease, Age at diagnosis: Age Unknown  Family history of hypertension, Age at diagnosis: Age Unknown

## 2021-09-26 NOTE — ED PROVIDER NOTE - ST/T WAVE
T wave inversions in Lead I and  aVL, and borderline ST depression in Lead II. Otherwise no reciprocal changes.

## 2021-09-26 NOTE — ED PROVIDER NOTE - OBJECTIVE STATEMENT
71 year old female with PMHx of HTN and diabetes who is vaccinated against COVID-19 presents to the ED with complaints of two days of gradual shortness of breath and chest pain. Patient denies having any leg swelling, nausea, fevers, or vomiting. Patient endorses that she has been missing her doses of Metoprolol and Amlodipine.   Allergies: Lisinopril (Rash), Aleve (Rash)

## 2021-09-26 NOTE — H&P ADULT - NSHPPHYSICALEXAM_GEN_ALL_CORE
LOS:     VITALS:   T(C): 36.7 (09-26-21 @ 07:09), Max: 36.7 (09-26-21 @ 07:09)  HR: 78 (09-26-21 @ 07:09) (76 - 100)  BP: 159/74 (09-26-21 @ 07:09) (150/80 - 176/91)  RR: 20 (09-26-21 @ 07:09) (20 - 24)  SpO2: 96% (09-26-21 @ 07:09) (95% - 100%)    GENERAL: NAD  HEAD:  Atraumatic, Normocephalic  EYES: EOMI, PERRLA, conjunctiva and sclera clear  NECK: Supple, No JVD  CHEST/LUNG: crackles b/l, no wheezing, unlaboured breathing.  HEART: Regular rate and rhythm; No murmurs;   ABDOMEN: Soft, Nontender, Nondistended; Bowel sounds present; No guarding  EXTREMITIES:  2+ Peripheral Pulses, No cyanosis or edema  PSYCH:  Normal Affect  NEUROLOGY: non-focal, AAO X 3. Strength is 5/5. no sensory loss.  SKIN: No rashes or lesions

## 2021-09-26 NOTE — ED ADULT NURSE REASSESSMENT NOTE - NS ED NURSE REASSESS COMMENT FT1
Pt reeived at 0700 from MARIELLE Riddle. BIPAP at bed side but pt not using it. O2 sat WNL on RA. 1140: Pt remains stable, AOX4, ambulating without oxygen, no s/s of any distress noted. Daughter at bed side. IV line in place, no signs of infiltration noted. Tolerating diet. VS WNL. Call bell in reach, bed in lowest position. Safety maintained, hourly rounding performed. Endorsed to nurse Escoto.

## 2021-09-27 ENCOUNTER — INPATIENT (INPATIENT)
Facility: HOSPITAL | Age: 71
LOS: 0 days | Discharge: ANOTHER HOSPITAL | End: 2021-09-28
Attending: INTERNAL MEDICINE | Admitting: INTERNAL MEDICINE
Payer: MEDICARE

## 2021-09-27 VITALS
HEIGHT: 60 IN | OXYGEN SATURATION: 99 % | RESPIRATION RATE: 18 BRPM | DIASTOLIC BLOOD PRESSURE: 78 MMHG | HEART RATE: 87 BPM | WEIGHT: 128.31 LBS | SYSTOLIC BLOOD PRESSURE: 143 MMHG | TEMPERATURE: 97 F

## 2021-09-27 VITALS
SYSTOLIC BLOOD PRESSURE: 133 MMHG | RESPIRATION RATE: 17 BRPM | DIASTOLIC BLOOD PRESSURE: 82 MMHG | OXYGEN SATURATION: 100 % | TEMPERATURE: 97 F | HEART RATE: 69 BPM

## 2021-09-27 DIAGNOSIS — E78.5 HYPERLIPIDEMIA, UNSPECIFIED: ICD-10-CM
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
## 2021-09-27 DIAGNOSIS — I21.4 NON-ST ELEVATION (NSTEMI) MYOCARDIAL INFARCTION: ICD-10-CM

## 2021-09-27 DIAGNOSIS — I10 ESSENTIAL (PRIMARY) HYPERTENSION: ICD-10-CM

## 2021-09-27 DIAGNOSIS — E11.9 TYPE 2 DIABETES MELLITUS WITHOUT COMPLICATIONS: ICD-10-CM

## 2021-09-27 DIAGNOSIS — Z98.49 CATARACT EXTRACTION STATUS, UNSPECIFIED EYE: Chronic | ICD-10-CM

## 2021-09-27 LAB
ANION GAP SERPL CALC-SCNC: 9 MMOL/L — SIGNIFICANT CHANGE UP (ref 5–17)
APTT BLD: 60.5 SEC — HIGH (ref 27.5–35.5)
BUN SERPL-MCNC: 31 MG/DL — HIGH (ref 7–18)
CALCIUM SERPL-MCNC: 9.1 MG/DL — SIGNIFICANT CHANGE UP (ref 8.4–10.5)
CHLORIDE SERPL-SCNC: 101 MMOL/L — SIGNIFICANT CHANGE UP (ref 96–108)
CK SERPL-CCNC: 150 U/L — SIGNIFICANT CHANGE UP (ref 21–215)
CO2 SERPL-SCNC: 29 MMOL/L — SIGNIFICANT CHANGE UP (ref 22–31)
COVID-19 SPIKE DOMAIN AB INTERP: POSITIVE
COVID-19 SPIKE DOMAIN ANTIBODY RESULT: >250 U/ML — HIGH
CREAT SERPL-MCNC: 1.02 MG/DL — SIGNIFICANT CHANGE UP (ref 0.5–1.3)
GLUCOSE BLDC GLUCOMTR-MCNC: 224 MG/DL — HIGH (ref 70–99)
GLUCOSE BLDC GLUCOMTR-MCNC: 247 MG/DL — HIGH (ref 70–99)
GLUCOSE SERPL-MCNC: 215 MG/DL — HIGH (ref 70–99)
HCT VFR BLD CALC: 33.9 % — LOW (ref 34.5–45)
HCV AB S/CO SERPL IA: 0.25 S/CO — SIGNIFICANT CHANGE UP (ref 0–0.99)
HCV AB SERPL-IMP: SIGNIFICANT CHANGE UP
HGB BLD-MCNC: 11.4 G/DL — LOW (ref 11.5–15.5)
MAGNESIUM SERPL-MCNC: 1.7 MG/DL — SIGNIFICANT CHANGE UP (ref 1.6–2.6)
MCHC RBC-ENTMCNC: 29.3 PG — SIGNIFICANT CHANGE UP (ref 27–34)
MCHC RBC-ENTMCNC: 33.6 GM/DL — SIGNIFICANT CHANGE UP (ref 32–36)
MCV RBC AUTO: 87.1 FL — SIGNIFICANT CHANGE UP (ref 80–100)
NRBC # BLD: 0 /100 WBCS — SIGNIFICANT CHANGE UP (ref 0–0)
PHOSPHATE SERPL-MCNC: 5 MG/DL — HIGH (ref 2.5–4.5)
PLATELET # BLD AUTO: 299 K/UL — SIGNIFICANT CHANGE UP (ref 150–400)
POTASSIUM SERPL-MCNC: 3.6 MMOL/L — SIGNIFICANT CHANGE UP (ref 3.5–5.3)
POTASSIUM SERPL-SCNC: 3.6 MMOL/L — SIGNIFICANT CHANGE UP (ref 3.5–5.3)
RBC # BLD: 3.89 M/UL — SIGNIFICANT CHANGE UP (ref 3.8–5.2)
RBC # FLD: 12.9 % — SIGNIFICANT CHANGE UP (ref 10.3–14.5)
SARS-COV-2 IGG+IGM SERPL QL IA: >250 U/ML — HIGH
SARS-COV-2 IGG+IGM SERPL QL IA: POSITIVE
SODIUM SERPL-SCNC: 139 MMOL/L — SIGNIFICANT CHANGE UP (ref 135–145)
TROPONIN I SERPL-MCNC: 11.7 NG/ML — HIGH (ref 0–0.04)
TROPONIN I SERPL-MCNC: 15.5 NG/ML — HIGH (ref 0–0.04)
WBC # BLD: 14.37 K/UL — HIGH (ref 3.8–10.5)
WBC # FLD AUTO: 14.37 K/UL — HIGH (ref 3.8–10.5)

## 2021-09-27 PROCEDURE — 85025 COMPLETE CBC W/AUTO DIFF WBC: CPT

## 2021-09-27 PROCEDURE — 94660 CPAP INITIATION&MGMT: CPT

## 2021-09-27 PROCEDURE — 80048 BASIC METABOLIC PNL TOTAL CA: CPT

## 2021-09-27 PROCEDURE — 99239 HOSP IP/OBS DSCHRG MGMT >30: CPT | Mod: GC

## 2021-09-27 PROCEDURE — 36415 COLL VENOUS BLD VENIPUNCTURE: CPT

## 2021-09-27 PROCEDURE — 85379 FIBRIN DEGRADATION QUANT: CPT

## 2021-09-27 PROCEDURE — 85730 THROMBOPLASTIN TIME PARTIAL: CPT

## 2021-09-27 PROCEDURE — 93458 L HRT ARTERY/VENTRICLE ANGIO: CPT | Mod: 26

## 2021-09-27 PROCEDURE — 83880 ASSAY OF NATRIURETIC PEPTIDE: CPT

## 2021-09-27 PROCEDURE — 80053 COMPREHEN METABOLIC PANEL: CPT

## 2021-09-27 PROCEDURE — 86803 HEPATITIS C AB TEST: CPT

## 2021-09-27 PROCEDURE — 93010 ELECTROCARDIOGRAM REPORT: CPT

## 2021-09-27 PROCEDURE — 85610 PROTHROMBIN TIME: CPT

## 2021-09-27 PROCEDURE — 82962 GLUCOSE BLOOD TEST: CPT

## 2021-09-27 PROCEDURE — 84100 ASSAY OF PHOSPHORUS: CPT

## 2021-09-27 PROCEDURE — 99291 CRITICAL CARE FIRST HOUR: CPT

## 2021-09-27 PROCEDURE — 94640 AIRWAY INHALATION TREATMENT: CPT

## 2021-09-27 PROCEDURE — 71045 X-RAY EXAM CHEST 1 VIEW: CPT

## 2021-09-27 PROCEDURE — 82550 ASSAY OF CK (CPK): CPT

## 2021-09-27 PROCEDURE — 93005 ELECTROCARDIOGRAM TRACING: CPT

## 2021-09-27 PROCEDURE — 85027 COMPLETE CBC AUTOMATED: CPT

## 2021-09-27 PROCEDURE — 93306 TTE W/DOPPLER COMPLETE: CPT

## 2021-09-27 PROCEDURE — 86769 SARS-COV-2 COVID-19 ANTIBODY: CPT

## 2021-09-27 PROCEDURE — 83735 ASSAY OF MAGNESIUM: CPT

## 2021-09-27 PROCEDURE — 84484 ASSAY OF TROPONIN QUANT: CPT

## 2021-09-27 PROCEDURE — 99152 MOD SED SAME PHYS/QHP 5/>YRS: CPT

## 2021-09-27 PROCEDURE — 0225U NFCT DS DNA&RNA 21 SARSCOV2: CPT

## 2021-09-27 RX ORDER — DEXTROSE 50 % IN WATER 50 %
25 SYRINGE (ML) INTRAVENOUS ONCE
Refills: 0 | Status: DISCONTINUED | OUTPATIENT
Start: 2021-09-27 | End: 2021-09-28

## 2021-09-27 RX ORDER — LOSARTAN POTASSIUM 100 MG/1
1 TABLET, FILM COATED ORAL
Qty: 0 | Refills: 0 | DISCHARGE

## 2021-09-27 RX ORDER — SODIUM CHLORIDE 9 MG/ML
1000 INJECTION, SOLUTION INTRAVENOUS
Refills: 0 | Status: DISCONTINUED | OUTPATIENT
Start: 2021-09-27 | End: 2021-09-28

## 2021-09-27 RX ORDER — SITAGLIPTIN AND METFORMIN HYDROCHLORIDE 500; 50 MG/1; MG/1
1 TABLET, FILM COATED ORAL
Qty: 0 | Refills: 0 | DISCHARGE

## 2021-09-27 RX ORDER — ASPIRIN/CALCIUM CARB/MAGNESIUM 324 MG
81 TABLET ORAL DAILY
Refills: 0 | Status: DISCONTINUED | OUTPATIENT
Start: 2021-09-27 | End: 2021-09-28

## 2021-09-27 RX ORDER — HEPARIN SODIUM 5000 [USP'U]/ML
3500 INJECTION INTRAVENOUS; SUBCUTANEOUS EVERY 6 HOURS
Refills: 0 | Status: DISCONTINUED | OUTPATIENT
Start: 2021-09-27 | End: 2021-09-28

## 2021-09-27 RX ORDER — ATORVASTATIN CALCIUM 80 MG/1
20 TABLET, FILM COATED ORAL AT BEDTIME
Refills: 0 | Status: DISCONTINUED | OUTPATIENT
Start: 2021-09-27 | End: 2021-09-28

## 2021-09-27 RX ORDER — DEXTROSE 50 % IN WATER 50 %
15 SYRINGE (ML) INTRAVENOUS ONCE
Refills: 0 | Status: DISCONTINUED | OUTPATIENT
Start: 2021-09-27 | End: 2021-09-28

## 2021-09-27 RX ORDER — SODIUM CHLORIDE 9 MG/ML
3 INJECTION INTRAMUSCULAR; INTRAVENOUS; SUBCUTANEOUS EVERY 8 HOURS
Refills: 0 | Status: DISCONTINUED | OUTPATIENT
Start: 2021-09-27 | End: 2021-09-28

## 2021-09-27 RX ORDER — INSULIN LISPRO 100/ML
VIAL (ML) SUBCUTANEOUS
Refills: 0 | Status: DISCONTINUED | OUTPATIENT
Start: 2021-09-27 | End: 2021-09-28

## 2021-09-27 RX ORDER — CARVEDILOL PHOSPHATE 80 MG/1
25 CAPSULE, EXTENDED RELEASE ORAL EVERY 12 HOURS
Refills: 0 | Status: DISCONTINUED | OUTPATIENT
Start: 2021-09-27 | End: 2021-09-28

## 2021-09-27 RX ORDER — GLUCAGON INJECTION, SOLUTION 0.5 MG/.1ML
1 INJECTION, SOLUTION SUBCUTANEOUS ONCE
Refills: 0 | Status: DISCONTINUED | OUTPATIENT
Start: 2021-09-27 | End: 2021-09-28

## 2021-09-27 RX ORDER — HEPARIN SODIUM 5000 [USP'U]/ML
INJECTION INTRAVENOUS; SUBCUTANEOUS
Qty: 25000 | Refills: 0 | Status: DISCONTINUED | OUTPATIENT
Start: 2021-09-27 | End: 2021-09-28

## 2021-09-27 RX ORDER — DEXTROSE 50 % IN WATER 50 %
12.5 SYRINGE (ML) INTRAVENOUS ONCE
Refills: 0 | Status: DISCONTINUED | OUTPATIENT
Start: 2021-09-27 | End: 2021-09-28

## 2021-09-27 RX ORDER — HYDROCHLOROTHIAZIDE 25 MG
12.5 TABLET ORAL DAILY
Refills: 0 | Status: DISCONTINUED | OUTPATIENT
Start: 2021-09-27 | End: 2021-09-28

## 2021-09-27 RX ORDER — INFLUENZA VIRUS VACCINE 15; 15; 15; 15 UG/.5ML; UG/.5ML; UG/.5ML; UG/.5ML
0.5 SUSPENSION INTRAMUSCULAR ONCE
Refills: 0 | Status: DISCONTINUED | OUTPATIENT
Start: 2021-09-27 | End: 2021-09-27

## 2021-09-27 RX ORDER — INSULIN LISPRO 100/ML
VIAL (ML) SUBCUTANEOUS AT BEDTIME
Refills: 0 | Status: DISCONTINUED | OUTPATIENT
Start: 2021-09-27 | End: 2021-09-28

## 2021-09-27 RX ORDER — LOSARTAN POTASSIUM 100 MG/1
100 TABLET, FILM COATED ORAL DAILY
Refills: 0 | Status: DISCONTINUED | OUTPATIENT
Start: 2021-09-27 | End: 2021-09-28

## 2021-09-27 RX ORDER — INFLUENZA VIRUS VACCINE 15; 15; 15; 15 UG/.5ML; UG/.5ML; UG/.5ML; UG/.5ML
0.7 SUSPENSION INTRAMUSCULAR ONCE
Refills: 0 | Status: DISCONTINUED | OUTPATIENT
Start: 2021-09-27 | End: 2021-09-28

## 2021-09-27 RX ADMIN — HEPARIN SODIUM 700 UNIT(S)/HR: 5000 INJECTION INTRAVENOUS; SUBCUTANEOUS at 02:22

## 2021-09-27 RX ADMIN — Medication 81 MILLIGRAM(S): at 18:09

## 2021-09-27 RX ADMIN — Medication 2: at 08:13

## 2021-09-27 RX ADMIN — ATORVASTATIN CALCIUM 20 MILLIGRAM(S): 80 TABLET, FILM COATED ORAL at 21:27

## 2021-09-27 RX ADMIN — Medication 40 MILLIGRAM(S): at 06:13

## 2021-09-27 RX ADMIN — SODIUM CHLORIDE 3 MILLILITER(S): 9 INJECTION INTRAMUSCULAR; INTRAVENOUS; SUBCUTANEOUS at 18:15

## 2021-09-27 RX ADMIN — Medication 2: at 12:10

## 2021-09-27 RX ADMIN — CARVEDILOL PHOSPHATE 25 MILLIGRAM(S): 80 CAPSULE, EXTENDED RELEASE ORAL at 06:13

## 2021-09-27 RX ADMIN — LOSARTAN POTASSIUM 50 MILLIGRAM(S): 100 TABLET, FILM COATED ORAL at 08:13

## 2021-09-27 RX ADMIN — PANTOPRAZOLE SODIUM 40 MILLIGRAM(S): 20 TABLET, DELAYED RELEASE ORAL at 06:13

## 2021-09-27 RX ADMIN — Medication 81 MILLIGRAM(S): at 11:55

## 2021-09-27 RX ADMIN — CARVEDILOL PHOSPHATE 25 MILLIGRAM(S): 80 CAPSULE, EXTENDED RELEASE ORAL at 21:28

## 2021-09-27 NOTE — TRANSFER ACCEPTANCE NOTE - PROBLEM/PLAN-2
How Severe Are Your Spot(S)?: mild
What Is The Reason For Today's Visit?: Full Body Skin Examination
What Is The Reason For Today's Visit? (Being Monitored For X): concerning skin lesions on an annual basis
DISPLAY PLAN FREE TEXT

## 2021-09-27 NOTE — PROGRESS NOTE ADULT - SUBJECTIVE AND OBJECTIVE BOX
CHIEF COMPLAINT:Patient is a 71y old  Female who presents with a chief complaint of CP,SOB.Pt appears comfortable.    	  REVIEW OF SYSTEMS:  CONSTITUTIONAL: No fever, weight loss, or fatigue  EYES: No eye pain, visual disturbances, or discharge  ENT:  No difficulty hearing, tinnitus, vertigo; No sinus or throat pain  NECK: No pain or stiffness  RESPIRATORY: No cough, wheezing, chills or hemoptysis; No Shortness of Breath  CARDIOVASCULAR: No chest pain, palpitations, passing out, dizziness, or leg swelling  GASTROINTESTINAL: No abdominal or epigastric pain. No nausea, vomiting, or hematemesis; No diarrhea or constipation. No melena or hematochezia.  GENITOURINARY: No dysuria, frequency, hematuria, or incontinence  NEUROLOGICAL: No headaches, memory loss, loss of strength, numbness, or tremors  SKIN: No itching, burning, rashes, or lesions   LYMPH Nodes: No enlarged glands  ENDOCRINE: No heat or cold intolerance; No hair loss  MUSCULOSKELETAL: No joint pain or swelling; No muscle, back, or extremity pain  PSYCHIATRIC: No depression, anxiety, mood swings, or difficulty sleeping  HEME/LYMPH: No easy bruising, or bleeding gums  ALLERGY AND IMMUNOLOGIC: No hives or eczema	      PHYSICAL EXAM:  T(C): 36.9 (09-27-21 @ 07:58), Max: 36.9 (09-27-21 @ 07:58)  HR: 71 (09-27-21 @ 07:58) (71 - 98)  BP: 122/58 (09-27-21 @ 07:58) (122/58 - 174/85)  RR: 17 (09-27-21 @ 07:58) (17 - 20)  SpO2: 99% (09-27-21 @ 07:58) (97% - 100%)        Appearance: Normal	  HEENT:   Normal oral mucosa, PERRL, EOMI	  Lymphatic: No lymphadenopathy  Cardiovascular: Normal S1 S2, No JVD, No murmurs, No edema  Respiratory: Lungs clear to auscultation	  Psychiatry: A & O x 3, Mood & affect appropriate  Gastrointestinal:  Soft, Non-tender, + BS	  Skin: No rashes, No ecchymoses, No cyanosis	  Neurologic: Non-focal  Extremities: Normal range of motion, No clubbing, cyanosis or edema  Vascular: Peripheral pulses palpable 2+ bilaterally    MEDICATIONS  (STANDING):  aspirin  chewable 81 milliGRAM(s) Oral daily  atorvastatin 20 milliGRAM(s) Oral at bedtime  carvedilol 25 milliGRAM(s) Oral every 12 hours  heparin  Infusion.  Unit(s)/Hr (7 mL/Hr) IV Continuous <Continuous>  insulin lispro (ADMELOG) corrective regimen sliding scale   SubCutaneous three times a day before meals  insulin lispro (ADMELOG) corrective regimen sliding scale   SubCutaneous at bedtime  losartan 50 milliGRAM(s) Oral two times a day  pantoprazole    Tablet 40 milliGRAM(s) Oral before breakfast      TELEMETRY: 	nsr      	  	  LABS:	 	      CARDIAC MARKERS ( 27 Sep 2021 07:00 )  11.700 ng/mL / x     / 150 U/L / x     / x      CARDIAC MARKERS ( 27 Sep 2021 01:27 )  15.500 ng/mL / x     / x     / x     / x      CARDIAC MARKERS ( 26 Sep 2021 18:32 )  19.700 ng/mL / x     / x     / x     / x      CARDIAC MARKERS ( 26 Sep 2021 10:35 )  17.200 ng/mL / x     / x     / x     / x      CARDIAC MARKERS ( 26 Sep 2021 03:23 )  0.515 ng/mL / x     / x     / x     / x                             11.4   14.37 )-----------( 299      ( 27 Sep 2021 07:00 )             33.9     09-27    139  |  101  |  31<H>  ----------------------------<  215<H>  3.6   |  29  |  1.02    Ca    9.1      27 Sep 2021 07:00  Phos  5.0     09-27  Mg     1.7     09-27    TPro  8.2  /  Alb  3.4<L>  /  TBili  0.4  /  DBili  x   /  AST  54<H>  /  ALT  23  /  AlkPhos  102  09-26    proBNP: Serum Pro-Brain Natriuretic Peptide: 4670 pg/mL (09-26 @ 03:23)    PT/INR - ( 26 Sep 2021 03:23 )   PT: 11.1 sec;   INR: 0.93 ratio         PTT - ( 27 Sep 2021 01:27 )  PTT:60.5 sec

## 2021-09-27 NOTE — ACUTE INTERFACILITY TRANSFER NOTE - HOSPITAL COURSE
72 y/o F with PMH of HTN, DM presented to ED with worsening SOB and chest discomfort admitted for acute coronary syndrome work up. Patient was treated for NSTEMI with heparin gtt, asa, b-blocker, statin. Patient was evaluated by cardiology, deemed a candidate for cardiac catheterization and for transfer to [facility] under Dr. Stein for catheterization. 70 y/o F with PMH of HTN, DM presented to ED with worsening SOB and chest discomfort admitted for acute coronary syndrome work up. Patient had elevated troponin on admission 0.515 > 17.2 > 19.7 > 15.5 > 11.7. Patient was treated for NSTEMI with heparin gtt, asa, b-blocker, statin. Patient was evaluated by cardiology, deemed a candidate for cardiac catheterization and for transfer to [facility] under Dr. Stein for catheterization.

## 2021-09-27 NOTE — CHART NOTE - NSCHARTNOTEFT_GEN_A_CORE
TENZIN CARMEN 71y Female s/p cath radial site check. Dressing is clear/dry/intact. Site is without hematoma or bleeding.   Patient denies pain, numbness, tingling, CP, SOB. VSS. Will continue to monitor  patient closely and start Heparin drip.       Vital Signs Last 24 Hrs  T(C): 36.6 (27 Sep 2021 21:26), Max: 36.9 (27 Sep 2021 07:58)  T(F): 97.9 (27 Sep 2021 21:26), Max: 98.4 (27 Sep 2021 07:58)  HR: 76 (27 Sep 2021 21:26) (69 - 87)  BP: 134/82 (27 Sep 2021 21:26) (122/58 - 143/78)  RR: 18 (27 Sep 2021 21:26) (17 - 18)  SpO2: 99% (27 Sep 2021 21:26) (98% - 100%)  Pulses palpable, cap refill <2 sec.     Louise De Luna PA-C  pager [phone]

## 2021-09-27 NOTE — ACUTE INTERFACILITY TRANSFER NOTE - PLAN OF CARE
You were admitted to the hospital with hypertension and your blood pressure was controlled with carvedilol, losartan and hydrochlorothiazide. You are recommended to continue your home medications upon discharge to home. You are being transferred to [facility] under Dr. Stein for cardiac catheterization. You were admitted to the hospital with a history of diabetes and your sugar was controlled with insulin as needed. You are recommended to continue your home medications upon discharge to home. You are being transferred to [facility] under Dr. Stein for cardiac catheterization. You were admitted to the hospital with elevated cardiac enzymes and concern for myocardial infarction. You were started on aspirin, and your beta blocker, and statin were continued. You are being transferred to [facility] under Dr. Stein for cardiac catheterization. You were treated for acute pulmonary edema which affected your breathing. You were treated with supplemental oxygen and intravenous furosemide and your symptoms improved. You are being transferred to [facility] under Dr. Stein for cardiac catheterization.

## 2021-09-27 NOTE — TRANSFER ACCEPTANCE NOTE - ASSESSMENT
71 year old female with HTN, DM type 2 who presented to [facility] ED 9/26/21 complaining of worsening SOB and chest discomfort. Patient R/I NSTEMI and pulmonary edema. Patient is now transferred to [facility] 9/27/21 for a cardiac catheterization with possible PTCA/stent.

## 2021-09-27 NOTE — TRANSFER ACCEPTANCE NOTE - HISTORY OF PRESENT ILLNESS
71 year old female with HTN, DM type 2 who presented to [facility] ED 9/25/21 complaining of worsening SOB and chest discomfort. Patient R/I NSTEMI with peak troponin 19.7 and BNP 4670 and admitted for acute coronary syndrome and pulmonary edema, started on Heparin gtt, ASA, b-blocker and Statin and given IV Lasix diuresis.  CXR with Increased pulmonary vascular congestion noted. Questionable increased airspace opacity noted in the right lower lobe which may represent superimposed pneumonia.  In light of patients cardiac risk factors, symptoms and abnormal noninvasive test findings there is high suspicion for CAD. Patient is now transferred to [facility] 9/27/21 for a cardiac catheterization with possible PTCA/stent.   COVID PCR not detected 9/26/21

## 2021-09-27 NOTE — TRANSFER ACCEPTANCE NOTE - NSICDXPASTMEDICALHX_GEN_ALL_CORE_FT
PAST MEDICAL HISTORY:  Diabetes     Hyperlipidemia     Hypertension     NSTEMI (non-ST elevation myocardial infarction)

## 2021-09-27 NOTE — CHART NOTE - NSCHARTNOTEFT_GEN_A_CORE
s/p cardiac catheterization revealing 3VD and recommended for CTS CABG evaluation. Advised by Dr SIOMARA Stein for patient to be started on Heparin drip (no bolus) at 2230 if radial site stable.   Primary ACP team aware

## 2021-09-28 ENCOUNTER — TRANSCRIPTION ENCOUNTER (OUTPATIENT)
Age: 71
End: 2021-09-28

## 2021-09-28 ENCOUNTER — INPATIENT (INPATIENT)
Facility: HOSPITAL | Age: 71
LOS: 7 days | Discharge: HOME HEALTH | DRG: 235 | End: 2021-10-06
Attending: THORACIC SURGERY (CARDIOTHORACIC VASCULAR SURGERY) | Admitting: THORACIC SURGERY (CARDIOTHORACIC VASCULAR SURGERY)
Payer: COMMERCIAL

## 2021-09-28 VITALS
RESPIRATION RATE: 16 BRPM | TEMPERATURE: 98 F | DIASTOLIC BLOOD PRESSURE: 62 MMHG | SYSTOLIC BLOOD PRESSURE: 126 MMHG | OXYGEN SATURATION: 98 % | HEART RATE: 65 BPM

## 2021-09-28 VITALS
SYSTOLIC BLOOD PRESSURE: 154 MMHG | TEMPERATURE: 98 F | RESPIRATION RATE: 16 BRPM | HEIGHT: 60 IN | HEART RATE: 66 BPM | DIASTOLIC BLOOD PRESSURE: 77 MMHG | WEIGHT: 123.46 LBS | OXYGEN SATURATION: 98 %

## 2021-09-28 DIAGNOSIS — I10 ESSENTIAL (PRIMARY) HYPERTENSION: ICD-10-CM

## 2021-09-28 DIAGNOSIS — E11.9 TYPE 2 DIABETES MELLITUS WITHOUT COMPLICATIONS: ICD-10-CM

## 2021-09-28 DIAGNOSIS — E78.5 HYPERLIPIDEMIA, UNSPECIFIED: ICD-10-CM

## 2021-09-28 DIAGNOSIS — I25.10 ATHEROSCLEROTIC HEART DISEASE OF NATIVE CORONARY ARTERY WITHOUT ANGINA PECTORIS: ICD-10-CM

## 2021-09-28 DIAGNOSIS — Z98.49 CATARACT EXTRACTION STATUS, UNSPECIFIED EYE: Chronic | ICD-10-CM

## 2021-09-28 LAB
ALBUMIN SERPL ELPH-MCNC: 4 G/DL — SIGNIFICANT CHANGE UP (ref 3.3–5)
ALP SERPL-CCNC: 81 U/L — SIGNIFICANT CHANGE UP (ref 40–120)
ALT FLD-CCNC: 13 U/L — SIGNIFICANT CHANGE UP (ref 10–45)
ANION GAP SERPL CALC-SCNC: 15 MMOL/L — HIGH (ref 7–14)
ANION GAP SERPL CALC-SCNC: 15 MMOL/L — SIGNIFICANT CHANGE UP (ref 5–17)
APPEARANCE UR: CLEAR — SIGNIFICANT CHANGE UP
APTT BLD: 166 SEC — SIGNIFICANT CHANGE UP (ref 27–36.3)
APTT BLD: 186.6 SEC — SIGNIFICANT CHANGE UP (ref 27–36.3)
APTT BLD: 80.7 SEC — HIGH (ref 27.5–35.5)
AST SERPL-CCNC: 25 U/L — SIGNIFICANT CHANGE UP (ref 10–40)
BILIRUB SERPL-MCNC: 0.4 MG/DL — SIGNIFICANT CHANGE UP (ref 0.2–1.2)
BILIRUB UR-MCNC: NEGATIVE — SIGNIFICANT CHANGE UP
BLD GP AB SCN SERPL QL: NEGATIVE — SIGNIFICANT CHANGE UP
BUN SERPL-MCNC: 36 MG/DL — HIGH (ref 7–23)
BUN SERPL-MCNC: 36 MG/DL — HIGH (ref 7–23)
CALCIUM SERPL-MCNC: 8.7 MG/DL — SIGNIFICANT CHANGE UP (ref 8.4–10.5)
CALCIUM SERPL-MCNC: 8.8 MG/DL — SIGNIFICANT CHANGE UP (ref 8.4–10.5)
CHLORIDE SERPL-SCNC: 96 MMOL/L — LOW (ref 98–107)
CHLORIDE SERPL-SCNC: 98 MMOL/L — SIGNIFICANT CHANGE UP (ref 96–108)
CO2 SERPL-SCNC: 26 MMOL/L — SIGNIFICANT CHANGE UP (ref 22–31)
CO2 SERPL-SCNC: 27 MMOL/L — SIGNIFICANT CHANGE UP (ref 22–31)
COLOR SPEC: SIGNIFICANT CHANGE UP
COVID-19 SPIKE DOMAIN AB INTERP: POSITIVE
COVID-19 SPIKE DOMAIN ANTIBODY RESULT: >250 U/ML — HIGH
CREAT SERPL-MCNC: 0.98 MG/DL — SIGNIFICANT CHANGE UP (ref 0.5–1.3)
CREAT SERPL-MCNC: 1.02 MG/DL — SIGNIFICANT CHANGE UP (ref 0.5–1.3)
DIFF PNL FLD: NEGATIVE — SIGNIFICANT CHANGE UP
FIBRINOGEN PPP-MCNC: 307 MG/DL — SIGNIFICANT CHANGE UP (ref 290–520)
GLUCOSE BLDC GLUCOMTR-MCNC: 166 MG/DL — HIGH (ref 70–99)
GLUCOSE BLDC GLUCOMTR-MCNC: 176 MG/DL — HIGH (ref 70–99)
GLUCOSE BLDC GLUCOMTR-MCNC: 187 MG/DL — HIGH (ref 70–99)
GLUCOSE BLDC GLUCOMTR-MCNC: 279 MG/DL — HIGH (ref 70–99)
GLUCOSE SERPL-MCNC: 211 MG/DL — HIGH (ref 70–99)
GLUCOSE SERPL-MCNC: 235 MG/DL — HIGH (ref 70–99)
GLUCOSE UR QL: NEGATIVE — SIGNIFICANT CHANGE UP
HCT VFR BLD CALC: 33.1 % — LOW (ref 34.5–45)
HCT VFR BLD CALC: 33.7 % — LOW (ref 34.5–45)
HGB BLD-MCNC: 11.1 G/DL — LOW (ref 11.5–15.5)
HGB BLD-MCNC: 11.2 G/DL — LOW (ref 11.5–15.5)
INR BLD: 1.04 RATIO — SIGNIFICANT CHANGE UP (ref 0.88–1.16)
KETONES UR-MCNC: NEGATIVE — SIGNIFICANT CHANGE UP
LEUKOCYTE ESTERASE UR-ACNC: ABNORMAL
MAGNESIUM SERPL-MCNC: 1.7 MG/DL — SIGNIFICANT CHANGE UP (ref 1.6–2.6)
MCHC RBC-ENTMCNC: 29.4 PG — SIGNIFICANT CHANGE UP (ref 27–34)
MCHC RBC-ENTMCNC: 29.6 PG — SIGNIFICANT CHANGE UP (ref 27–34)
MCHC RBC-ENTMCNC: 32.9 GM/DL — SIGNIFICANT CHANGE UP (ref 32–36)
MCHC RBC-ENTMCNC: 33.8 GM/DL — SIGNIFICANT CHANGE UP (ref 32–36)
MCV RBC AUTO: 87.3 FL — SIGNIFICANT CHANGE UP (ref 80–100)
MCV RBC AUTO: 89.4 FL — SIGNIFICANT CHANGE UP (ref 80–100)
NITRITE UR-MCNC: NEGATIVE — SIGNIFICANT CHANGE UP
NRBC # BLD: 0 /100 WBCS — SIGNIFICANT CHANGE UP
NRBC # BLD: 0 /100 WBCS — SIGNIFICANT CHANGE UP (ref 0–0)
NRBC # FLD: 0 K/UL — SIGNIFICANT CHANGE UP
NT-PROBNP SERPL-SCNC: 3651 PG/ML — HIGH (ref 0–300)
PH UR: 6.5 — SIGNIFICANT CHANGE UP (ref 5–8)
PHOSPHATE SERPL-MCNC: 3.7 MG/DL — SIGNIFICANT CHANGE UP (ref 2.5–4.5)
PLATELET # BLD AUTO: 257 K/UL — SIGNIFICANT CHANGE UP (ref 150–400)
PLATELET # BLD AUTO: 259 K/UL — SIGNIFICANT CHANGE UP (ref 150–400)
POTASSIUM SERPL-MCNC: 3 MMOL/L — LOW (ref 3.5–5.3)
POTASSIUM SERPL-MCNC: 3.9 MMOL/L — SIGNIFICANT CHANGE UP (ref 3.5–5.3)
POTASSIUM SERPL-SCNC: 3 MMOL/L — LOW (ref 3.5–5.3)
POTASSIUM SERPL-SCNC: 3.9 MMOL/L — SIGNIFICANT CHANGE UP (ref 3.5–5.3)
PROT SERPL-MCNC: 7.4 G/DL — SIGNIFICANT CHANGE UP (ref 6–8.3)
PROT UR-MCNC: NEGATIVE — SIGNIFICANT CHANGE UP
PROTHROM AB SERPL-ACNC: 12.5 SEC — SIGNIFICANT CHANGE UP (ref 10.6–13.6)
RBC # BLD: 3.77 M/UL — LOW (ref 3.8–5.2)
RBC # BLD: 3.79 M/UL — LOW (ref 3.8–5.2)
RBC # FLD: 13.2 % — SIGNIFICANT CHANGE UP (ref 10.3–14.5)
RBC # FLD: 13.2 % — SIGNIFICANT CHANGE UP (ref 10.3–14.5)
RH IG SCN BLD-IMP: POSITIVE — SIGNIFICANT CHANGE UP
SARS-COV-2 IGG+IGM SERPL QL IA: >250 U/ML — HIGH
SARS-COV-2 IGG+IGM SERPL QL IA: POSITIVE
SODIUM SERPL-SCNC: 138 MMOL/L — SIGNIFICANT CHANGE UP (ref 135–145)
SODIUM SERPL-SCNC: 139 MMOL/L — SIGNIFICANT CHANGE UP (ref 135–145)
SP GR SPEC: 1.01 — SIGNIFICANT CHANGE UP (ref 1.01–1.02)
UROBILINOGEN FLD QL: NEGATIVE — SIGNIFICANT CHANGE UP
WBC # BLD: 10.04 K/UL — SIGNIFICANT CHANGE UP (ref 3.8–10.5)
WBC # BLD: 9.46 K/UL — SIGNIFICANT CHANGE UP (ref 3.8–10.5)
WBC # FLD AUTO: 10.04 K/UL — SIGNIFICANT CHANGE UP (ref 3.8–10.5)
WBC # FLD AUTO: 9.46 K/UL — SIGNIFICANT CHANGE UP (ref 3.8–10.5)

## 2021-09-28 PROCEDURE — 93010 ELECTROCARDIOGRAM REPORT: CPT

## 2021-09-28 PROCEDURE — 99222 1ST HOSP IP/OBS MODERATE 55: CPT

## 2021-09-28 PROCEDURE — 93880 EXTRACRANIAL BILAT STUDY: CPT | Mod: 26

## 2021-09-28 PROCEDURE — 93306 TTE W/DOPPLER COMPLETE: CPT | Mod: 26

## 2021-09-28 RX ORDER — INSULIN LISPRO 100/ML
0 VIAL (ML) SUBCUTANEOUS
Qty: 0 | Refills: 0 | DISCHARGE
Start: 2021-09-28

## 2021-09-28 RX ORDER — ATORVASTATIN CALCIUM 80 MG/1
1 TABLET, FILM COATED ORAL
Qty: 0 | Refills: 0 | DISCHARGE
Start: 2021-09-28

## 2021-09-28 RX ORDER — INSULIN LISPRO 100/ML
VIAL (ML) SUBCUTANEOUS
Refills: 0 | Status: DISCONTINUED | OUTPATIENT
Start: 2021-09-28 | End: 2021-09-30

## 2021-09-28 RX ORDER — HEPARIN SODIUM 5000 [USP'U]/ML
500 INJECTION INTRAVENOUS; SUBCUTANEOUS
Qty: 25000 | Refills: 0 | Status: DISCONTINUED | OUTPATIENT
Start: 2021-09-28 | End: 2021-09-29

## 2021-09-28 RX ORDER — HEPARIN SODIUM 5000 [USP'U]/ML
0 INJECTION INTRAVENOUS; SUBCUTANEOUS
Qty: 0 | Refills: 0 | DISCHARGE
Start: 2021-09-28

## 2021-09-28 RX ORDER — ATORVASTATIN CALCIUM 80 MG/1
1 TABLET, FILM COATED ORAL
Qty: 0 | Refills: 0 | DISCHARGE

## 2021-09-28 RX ORDER — INFLUENZA VIRUS VACCINE 15; 15; 15; 15 UG/.5ML; UG/.5ML; UG/.5ML; UG/.5ML
0.5 SUSPENSION INTRAMUSCULAR ONCE
Refills: 0 | Status: DISCONTINUED | OUTPATIENT
Start: 2021-09-28 | End: 2021-10-01

## 2021-09-28 RX ORDER — AMLODIPINE BESYLATE 2.5 MG/1
1 TABLET ORAL
Qty: 0 | Refills: 0 | DISCHARGE

## 2021-09-28 RX ORDER — ASPIRIN/CALCIUM CARB/MAGNESIUM 324 MG
1 TABLET ORAL
Qty: 0 | Refills: 0 | DISCHARGE
Start: 2021-09-28

## 2021-09-28 RX ORDER — LOSARTAN POTASSIUM 100 MG/1
1 TABLET, FILM COATED ORAL
Qty: 0 | Refills: 0 | DISCHARGE
Start: 2021-09-28

## 2021-09-28 RX ORDER — INSULIN LISPRO 100/ML
VIAL (ML) SUBCUTANEOUS AT BEDTIME
Refills: 0 | Status: DISCONTINUED | OUTPATIENT
Start: 2021-09-28 | End: 2021-09-30

## 2021-09-28 RX ORDER — CARVEDILOL PHOSPHATE 80 MG/1
1 CAPSULE, EXTENDED RELEASE ORAL
Qty: 0 | Refills: 0 | DISCHARGE

## 2021-09-28 RX ORDER — SODIUM CHLORIDE 9 MG/ML
3 INJECTION INTRAMUSCULAR; INTRAVENOUS; SUBCUTANEOUS EVERY 8 HOURS
Refills: 0 | Status: DISCONTINUED | OUTPATIENT
Start: 2021-09-28 | End: 2021-10-01

## 2021-09-28 RX ORDER — POTASSIUM CHLORIDE 20 MEQ
40 PACKET (EA) ORAL ONCE
Refills: 0 | Status: COMPLETED | OUTPATIENT
Start: 2021-09-28 | End: 2021-09-28

## 2021-09-28 RX ORDER — POTASSIUM CHLORIDE 20 MEQ
1 PACKET (EA) ORAL
Qty: 0 | Refills: 0 | DISCHARGE
Start: 2021-09-28

## 2021-09-28 RX ORDER — LOSARTAN POTASSIUM 100 MG/1
2 TABLET, FILM COATED ORAL
Qty: 0 | Refills: 0 | DISCHARGE

## 2021-09-28 RX ORDER — CARVEDILOL PHOSPHATE 80 MG/1
1 CAPSULE, EXTENDED RELEASE ORAL
Qty: 0 | Refills: 0 | DISCHARGE
Start: 2021-09-28

## 2021-09-28 RX ORDER — ASPIRIN/CALCIUM CARB/MAGNESIUM 324 MG
1 TABLET ORAL
Qty: 0 | Refills: 0 | DISCHARGE

## 2021-09-28 RX ORDER — SITAGLIPTIN AND METFORMIN HYDROCHLORIDE 500; 50 MG/1; MG/1
1 TABLET, FILM COATED ORAL
Qty: 0 | Refills: 0 | DISCHARGE

## 2021-09-28 RX ORDER — HYDROCHLOROTHIAZIDE 25 MG
12.5 TABLET ORAL DAILY
Refills: 0 | Status: DISCONTINUED | OUTPATIENT
Start: 2021-09-28 | End: 2021-10-01

## 2021-09-28 RX ORDER — POTASSIUM CHLORIDE 20 MEQ
20 PACKET (EA) ORAL
Refills: 0 | Status: DISCONTINUED | OUTPATIENT
Start: 2021-09-28 | End: 2021-09-28

## 2021-09-28 RX ORDER — AMLODIPINE BESYLATE 2.5 MG/1
5 TABLET ORAL DAILY
Refills: 0 | Status: DISCONTINUED | OUTPATIENT
Start: 2021-09-28 | End: 2021-09-28

## 2021-09-28 RX ORDER — ATORVASTATIN CALCIUM 80 MG/1
40 TABLET, FILM COATED ORAL AT BEDTIME
Refills: 0 | Status: DISCONTINUED | OUTPATIENT
Start: 2021-09-28 | End: 2021-10-01

## 2021-09-28 RX ORDER — ACETAMINOPHEN 500 MG
650 TABLET ORAL ONCE
Refills: 0 | Status: COMPLETED | OUTPATIENT
Start: 2021-09-28 | End: 2021-09-28

## 2021-09-28 RX ORDER — ASPIRIN/CALCIUM CARB/MAGNESIUM 324 MG
81 TABLET ORAL DAILY
Refills: 0 | Status: DISCONTINUED | OUTPATIENT
Start: 2021-09-28 | End: 2021-10-01

## 2021-09-28 RX ORDER — CARVEDILOL PHOSPHATE 80 MG/1
25 CAPSULE, EXTENDED RELEASE ORAL EVERY 12 HOURS
Refills: 0 | Status: DISCONTINUED | OUTPATIENT
Start: 2021-09-28 | End: 2021-10-01

## 2021-09-28 RX ORDER — HYDRALAZINE HCL 50 MG
10 TABLET ORAL EVERY 8 HOURS
Refills: 0 | Status: DISCONTINUED | OUTPATIENT
Start: 2021-09-28 | End: 2021-10-01

## 2021-09-28 RX ADMIN — HEPARIN SODIUM 500 UNIT(S)/HR: 5000 INJECTION INTRAVENOUS; SUBCUTANEOUS at 17:18

## 2021-09-28 RX ADMIN — Medication 20 MILLIEQUIVALENT(S): at 11:39

## 2021-09-28 RX ADMIN — Medication 40 MILLIEQUIVALENT(S): at 13:40

## 2021-09-28 RX ADMIN — CARVEDILOL PHOSPHATE 25 MILLIGRAM(S): 80 CAPSULE, EXTENDED RELEASE ORAL at 17:17

## 2021-09-28 RX ADMIN — HEPARIN SODIUM 700 UNIT(S)/HR: 5000 INJECTION INTRAVENOUS; SUBCUTANEOUS at 00:15

## 2021-09-28 RX ADMIN — HEPARIN SODIUM 0 UNIT(S)/HR: 5000 INJECTION INTRAVENOUS; SUBCUTANEOUS at 09:47

## 2021-09-28 RX ADMIN — Medication 2: at 13:39

## 2021-09-28 RX ADMIN — ATORVASTATIN CALCIUM 40 MILLIGRAM(S): 80 TABLET, FILM COATED ORAL at 21:38

## 2021-09-28 RX ADMIN — Medication 650 MILLIGRAM(S): at 11:00

## 2021-09-28 RX ADMIN — Medication 10 MILLIGRAM(S): at 21:38

## 2021-09-28 RX ADMIN — CARVEDILOL PHOSPHATE 25 MILLIGRAM(S): 80 CAPSULE, EXTENDED RELEASE ORAL at 05:01

## 2021-09-28 RX ADMIN — SODIUM CHLORIDE 3 MILLILITER(S): 9 INJECTION INTRAMUSCULAR; INTRAVENOUS; SUBCUTANEOUS at 21:41

## 2021-09-28 RX ADMIN — SODIUM CHLORIDE 3 MILLILITER(S): 9 INJECTION INTRAMUSCULAR; INTRAVENOUS; SUBCUTANEOUS at 05:00

## 2021-09-28 RX ADMIN — Medication 12.5 MILLIGRAM(S): at 05:01

## 2021-09-28 RX ADMIN — Medication 81 MILLIGRAM(S): at 11:37

## 2021-09-28 RX ADMIN — Medication 2: at 17:17

## 2021-09-28 RX ADMIN — Medication 650 MILLIGRAM(S): at 09:06

## 2021-09-28 RX ADMIN — LOSARTAN POTASSIUM 100 MILLIGRAM(S): 100 TABLET, FILM COATED ORAL at 11:36

## 2021-09-28 RX ADMIN — Medication 1: at 09:16

## 2021-09-28 NOTE — H&P ADULT - PROBLEM SELECTOR PLAN 2
Check hemoglobin A1c  Continue diabetic diet  Continue Admelog insulin sliding scale  Check fingersticks AC/HS

## 2021-09-28 NOTE — DISCHARGE NOTE PROVIDER - CARE PROVIDER_API CALL
[facility],   Being transfered for further eval  Phone: (   )    -  Fax: (   )    -  Follow Up Time:

## 2021-09-28 NOTE — DISCHARGE NOTE PROVIDER - NSDCMRMEDTOKEN_GEN_ALL_CORE_FT
aspirin 81 mg oral delayed release tablet: 1 tab(s) orally once a day  atorvastatin 20 mg oral tablet: 1 tab(s) orally once a day (at bedtime)  carvedilol 25 mg oral tablet: 1 tab(s) orally every 12 hours  glipiZIDE 2.5 mg oral tablet, extended release: 1 tab(s) orally once a day  hydroCHLOROthiazide 12.5 mg oral capsule: 1 cap(s) orally once a day  Janumet 50 mg-1000 mg oral tablet: 1 tab(s) orally 2 times a day  losartan 100 mg oral tablet: 1 tab(s) orally once a day  potassium chloride 20 mEq oral tablet, extended release: 1 tab(s) orally every 2 hours

## 2021-09-28 NOTE — DISCHARGE NOTE PROVIDER - NSDCCPCAREPLAN_GEN_ALL_CORE_FT
PRINCIPAL DISCHARGE DIAGNOSIS  Diagnosis: S/P cardiac catheterization  Assessment and Plan of Treatment: You are being transfered to [facility] for CABG evaluation.

## 2021-09-28 NOTE — DISCHARGE NOTE PROVIDER - HOSPITAL COURSE
71 year old female with HTN, DM type 2 who presented to [facility] ED 9/25/21 complaining of worsening SOB and chest discomfort. Patient R/I NSTEMI and pulmonary edema with ECHo revealing EF 35%. Patient is now transferred to [facility] 9/27/21 for a cardiac catheterization with possible PTCA/stent.   9/27 CATH: LVEDP 10, ostial LAD 95, OM 90, RPDA 90; RRA access recommend CTS CABG evaluation.     Discussed with Dr. Izabel Stein, patient being transferred to [facility] for continued workup for CABG evaluation.

## 2021-09-28 NOTE — CHART NOTE - NSCHARTNOTEFT_GEN_A_CORE
71 year old female with HTN, DM type 2 who presented to [facility] ED 9/25/21 complaining of worsening SOB and chest discomfort. Patient R/I NSTEMI and pulmonary edema with ECHo revealing EF 35%. Patient is now transferred to [facility] 9/27/21 for a cardiac catheterization with possible PTCA/stent.   9/27 CATH: LVEDP 10, ostial LAD 95, OM 90, RPDA 90; RRA access recommend CTS CABG evaluation.     Discussed with Dr. Feliciano __, patient being transferred to [place] for continued workup for CABG evaluation 71 year old female with HTN, DM type 2 who presented to [facility] ED 9/25/21 complaining of worsening SOB and chest discomfort. Patient R/I NSTEMI and pulmonary edema with ECHo revealing EF 35%. Patient is now transferred to [facility] 9/27/21 for a cardiac catheterization with possible PTCA/stent.   9/27 CATH: LVEDP 10, ostial LAD 95, OM 90, RPDA 90; RRA access recommend CTS CABG evaluation.     Discussed with Dr. Izabel Stein, patient being transferred to [place] for continued workup for CABG evaluation. Accepted by Dr. Edmond.

## 2021-09-28 NOTE — H&P ADULT - HISTORY OF PRESENT ILLNESS
This is a 70 y/o female with PMHx of HTN, DM type 2 [facility] complaining of worsening SOB and chest discomfort. Patient R/I NSTEMI and pulmonary edema with ECHo revealing EF 35%. Patient is now transferred to [facility] 9/27/21 for a cardiac catheterization with possible PTCA/stent. Pt s/p 9/27 CATH: LVEDP 10, ostial LAD 95, OM 90, RPDA 90; RRA access. Pt transferred to [facility] for CABG evaluation and further management.      This is a 70 y/o female with PMHx of HTN, DM type 2 who presented to [facility] ED 9/25/21 complaining of worsening SOB and chest discomfort. Patient R/I NSTEMI and pulmonary edema with ECHo revealing EF 35%. Patient was transferred to [facility] 9/27/21 for a cardiac catheterization with possible PTCA/stent. Pt s/p 9/27 CATH: LVEDP 10, ostial LAD 95, OM 90, RPDA 90; RRA access, now transferred to [facility] for CABG evaluation and further management.

## 2021-09-28 NOTE — DISCHARGE NOTE PROVIDER - PROVIDER TOKENS
FREE:[LAST:[[facility]],PHONE:[(   )    -],FAX:[(   )    -],ADDRESS:[Being transfered for further eval]]

## 2021-09-28 NOTE — H&P ADULT - NSHPPHYSICALEXAM_GEN_ALL_CORE
General: NAD  HEENT:  NC/AT  Neuro: A&Ox4, no focal deficits noted  Respiratory: B/L BS CTA, no wheeze, no rhonchi noted  Cardiovascular: RRR, normal S1S2  GI: Abd soft, NT/ND, +BSx4Q   Peripheral Vascular:  B/L LE neg edema, 2+ peripheral pulses, no cyanosis, varicosities/PVD noted  Musculoskeletal: B/L UE and LE 5/5 strength   Psychiatric: Normal mood, normal affect observed  Skin: Normal exam to inspection and palpation. Right radial incision with DSD CDI.

## 2021-09-28 NOTE — DISCHARGE NOTE NURSING/CASE MANAGEMENT/SOCIAL WORK - PATIENT PORTAL LINK FT
You can access the FollowMyHealth Patient Portal offered by [facility] by registering at the following website: [email or web address]. By joining [facility]’s FollowMyHealth portal, you will also be able to view your health information using other applications (apps) compatible with our system.

## 2021-09-28 NOTE — H&P ADULT - ASSESSMENT
This is a 70 y/o female with PMHx of HTN, DM type 2 who presented to [facility] ED 9/25/21 complaining of worsening SOB and chest discomfort. Patient R/I NSTEMI and pulmonary edema with ECHo revealing EF 35%. Patient was transferred to [facility] 9/27/21 for a cardiac catheterization with possible PTCA/stent. Pt s/p 9/27 CATH: LVEDP 10, ostial LAD 95, OM 90, RPDA 90; RRA access. Pt transferred to [facility] for CABG evaluation and further management.

## 2021-09-28 NOTE — H&P ADULT - PROBLEM SELECTOR PLAN 3
ARB/ACEi on hold  Amlodipine 5mg daily for BP control ARB/ACEi on hold  Pt refusing Amlodipine for BP control, states she gets palpitations  Started on hydralazine 10 q8h

## 2021-09-28 NOTE — H&P ADULT - NSHPSOCIALHISTORY_GEN_ALL_CORE
Marital Status: , lives with   Occupation: Retired RN    Substance Use: Pt denies  Tobacco Usage:  ( X  ) never smoked   (   ) former smoker   (   ) current smoker  (     ) pack year  (        ) last tobacco use date  Alcohol Usage: Pt denies

## 2021-09-28 NOTE — H&P ADULT - PROBLEM SELECTOR PLAN 1
Continue pre-op cardiac surgery workup  Continue asa 81 daily, carvedilol 25 BID, atorvastatin 40 HS  Continue Heparin drip  Daily aptt  Check PFTs/US Carotids/ TTE  Plan for OR Friday 10/1 with Dr. Edmond

## 2021-09-28 NOTE — H&P ADULT - NSICDXPASTMEDICALHX_GEN_ALL_CORE_FT
PAST MEDICAL HISTORY:  Diabetes     HTN (hypertension)     Hyperlipidemia     Hypertension     NSTEMI (non-ST elevation myocardial infarction)

## 2021-09-29 LAB
A1C WITH ESTIMATED AVERAGE GLUCOSE RESULT: 7.5 % — HIGH (ref 4–5.6)
ANION GAP SERPL CALC-SCNC: 13 MMOL/L — SIGNIFICANT CHANGE UP (ref 5–17)
APTT BLD: 45.9 SEC — HIGH (ref 27.5–35.5)
APTT BLD: 58.9 SEC — HIGH (ref 27.5–35.5)
APTT BLD: 60.4 SEC — HIGH (ref 27.5–35.5)
BUN SERPL-MCNC: 36 MG/DL — HIGH (ref 7–23)
CALCIUM SERPL-MCNC: 8.9 MG/DL — SIGNIFICANT CHANGE UP (ref 8.4–10.5)
CHLORIDE SERPL-SCNC: 100 MMOL/L — SIGNIFICANT CHANGE UP (ref 96–108)
CO2 SERPL-SCNC: 25 MMOL/L — SIGNIFICANT CHANGE UP (ref 22–31)
CREAT SERPL-MCNC: 1 MG/DL — SIGNIFICANT CHANGE UP (ref 0.5–1.3)
ESTIMATED AVERAGE GLUCOSE: 169 MG/DL — HIGH (ref 68–114)
GLUCOSE BLDC GLUCOMTR-MCNC: 122 MG/DL — HIGH (ref 70–99)
GLUCOSE BLDC GLUCOMTR-MCNC: 156 MG/DL — HIGH (ref 70–99)
GLUCOSE BLDC GLUCOMTR-MCNC: 241 MG/DL — HIGH (ref 70–99)
GLUCOSE BLDC GLUCOMTR-MCNC: 245 MG/DL — HIGH (ref 70–99)
GLUCOSE SERPL-MCNC: 201 MG/DL — HIGH (ref 70–99)
HCT VFR BLD CALC: 33.9 % — LOW (ref 34.5–45)
HGB BLD-MCNC: 10.9 G/DL — LOW (ref 11.5–15.5)
MCHC RBC-ENTMCNC: 28.7 PG — SIGNIFICANT CHANGE UP (ref 27–34)
MCHC RBC-ENTMCNC: 32.2 GM/DL — SIGNIFICANT CHANGE UP (ref 32–36)
MCV RBC AUTO: 89.2 FL — SIGNIFICANT CHANGE UP (ref 80–100)
MRSA PCR RESULT.: SIGNIFICANT CHANGE UP
NRBC # BLD: 0 /100 WBCS — SIGNIFICANT CHANGE UP (ref 0–0)
PLATELET # BLD AUTO: 267 K/UL — SIGNIFICANT CHANGE UP (ref 150–400)
POTASSIUM SERPL-MCNC: 4.3 MMOL/L — SIGNIFICANT CHANGE UP (ref 3.5–5.3)
POTASSIUM SERPL-SCNC: 4.3 MMOL/L — SIGNIFICANT CHANGE UP (ref 3.5–5.3)
RBC # BLD: 3.8 M/UL — SIGNIFICANT CHANGE UP (ref 3.8–5.2)
RBC # FLD: 13.2 % — SIGNIFICANT CHANGE UP (ref 10.3–14.5)
S AUREUS DNA NOSE QL NAA+PROBE: SIGNIFICANT CHANGE UP
SODIUM SERPL-SCNC: 138 MMOL/L — SIGNIFICANT CHANGE UP (ref 135–145)
T3 SERPL-MCNC: 105 NG/DL — SIGNIFICANT CHANGE UP (ref 80–200)
T4 AB SER-ACNC: 8.3 UG/DL — SIGNIFICANT CHANGE UP (ref 4.6–12)
TSH SERPL-MCNC: 2.03 UIU/ML — SIGNIFICANT CHANGE UP (ref 0.27–4.2)
WBC # BLD: 8.33 K/UL — SIGNIFICANT CHANGE UP (ref 3.8–10.5)
WBC # FLD AUTO: 8.33 K/UL — SIGNIFICANT CHANGE UP (ref 3.8–10.5)

## 2021-09-29 PROCEDURE — 71045 X-RAY EXAM CHEST 1 VIEW: CPT | Mod: 26

## 2021-09-29 PROCEDURE — 94010 BREATHING CAPACITY TEST: CPT | Mod: 26

## 2021-09-29 RX ORDER — HEPARIN SODIUM 5000 [USP'U]/ML
550 INJECTION INTRAVENOUS; SUBCUTANEOUS
Qty: 25000 | Refills: 0 | Status: DISCONTINUED | OUTPATIENT
Start: 2021-09-29 | End: 2021-09-30

## 2021-09-29 RX ORDER — HEPARIN SODIUM 5000 [USP'U]/ML
450 INJECTION INTRAVENOUS; SUBCUTANEOUS
Qty: 25000 | Refills: 0 | Status: DISCONTINUED | OUTPATIENT
Start: 2021-09-29 | End: 2021-09-29

## 2021-09-29 RX ADMIN — Medication 2: at 07:29

## 2021-09-29 RX ADMIN — CARVEDILOL PHOSPHATE 25 MILLIGRAM(S): 80 CAPSULE, EXTENDED RELEASE ORAL at 17:27

## 2021-09-29 RX ADMIN — Medication 10 MILLIGRAM(S): at 21:36

## 2021-09-29 RX ADMIN — HEPARIN SODIUM 5.5 UNIT(S)/HR: 5000 INJECTION INTRAVENOUS; SUBCUTANEOUS at 17:42

## 2021-09-29 RX ADMIN — SODIUM CHLORIDE 3 MILLILITER(S): 9 INJECTION INTRAMUSCULAR; INTRAVENOUS; SUBCUTANEOUS at 05:24

## 2021-09-29 RX ADMIN — HEPARIN SODIUM 450 UNIT(S)/HR: 5000 INJECTION INTRAVENOUS; SUBCUTANEOUS at 00:01

## 2021-09-29 RX ADMIN — Medication 10 MILLIGRAM(S): at 14:46

## 2021-09-29 RX ADMIN — ATORVASTATIN CALCIUM 40 MILLIGRAM(S): 80 TABLET, FILM COATED ORAL at 21:35

## 2021-09-29 RX ADMIN — Medication 4: at 11:58

## 2021-09-29 RX ADMIN — Medication 12.5 MILLIGRAM(S): at 05:35

## 2021-09-29 RX ADMIN — Medication 10 MILLIGRAM(S): at 05:35

## 2021-09-29 RX ADMIN — CARVEDILOL PHOSPHATE 25 MILLIGRAM(S): 80 CAPSULE, EXTENDED RELEASE ORAL at 05:35

## 2021-09-29 RX ADMIN — SODIUM CHLORIDE 3 MILLILITER(S): 9 INJECTION INTRAMUSCULAR; INTRAVENOUS; SUBCUTANEOUS at 14:32

## 2021-09-29 RX ADMIN — HEPARIN SODIUM 450 UNIT(S)/HR: 5000 INJECTION INTRAVENOUS; SUBCUTANEOUS at 09:44

## 2021-09-29 RX ADMIN — Medication 81 MILLIGRAM(S): at 12:03

## 2021-09-29 RX ADMIN — SODIUM CHLORIDE 3 MILLILITER(S): 9 INJECTION INTRAMUSCULAR; INTRAVENOUS; SUBCUTANEOUS at 22:22

## 2021-09-29 NOTE — PROGRESS NOTE ADULT - ASSESSMENT
This is a 70 y/o female with PMHx of HTN, DM type 2 who presented to [facility] ED 9/25/21 complaining of worsening SOB and chest discomfort. Patient R/I NSTEMI and pulmonary edema with ECHO revealed EF 35%. Patient was transferred to [facility] 9/27/21 for a cardiac catheterization with possible PTCA/stent. Pt s/p 9/27 CATH: LVEDP 10, ostial LAD 95, OM 90, RPDA 90; RRA access. Pt transferred to [facility] for CABG evaluation and further management.  She was deemed a surgical candidate and is now pre-op for CABG Friday 10/1/21.    9/30 No events overnight; COVID swab and T&S tomorrow.  Echo, CXR, carotids, PFTs done.

## 2021-09-29 NOTE — PROGRESS NOTE ADULT - SUBJECTIVE AND OBJECTIVE BOX
Patient discussed on morning rounds with Dr. Feli JIMENEZ; Pre op    SUBJECTIVE ASSESSMENT:  Patient denies any c/o this morning.  Feels well.  Ambulates w/o issues and tolerating PO diet.  Denies CP, SOB, dizziness, N/V/D, fever/chills or any recent sick contacts (Flu or COVID).        Vital Signs Last 24 Hrs  T(C): 36.6 (29 Sep 2021 12:19), Max: 36.7 (28 Sep 2021 19:29)  T(F): 97.8 (29 Sep 2021 12:19), Max: 98.1 (28 Sep 2021 19:29)  HR: 74 (29 Sep 2021 12:19) (62 - 78)  BP: 130/78 (29 Sep 2021 12:19) (122/90 - 154/77)  BP(mean): --  RR: 18 (29 Sep 2021 12:19) (16 - 18)  SpO2: 99% (29 Sep 2021 12:19) (98% - 99%)  I&O's Detail    28 Sep 2021 07:01  -  29 Sep 2021 07:00  --------------------------------------------------------  IN:    Heparin Infusion: 18 mL    Heparin Infusion: 55.5 mL    Oral Fluid: 180 mL  Total IN: 253.5 mL    OUT:    Voided (mL): 500 mL  Total OUT: 500 mL    Total NET: -246.5 mL      29 Sep 2021 07:01  -  29 Sep 2021 12:28  --------------------------------------------------------  IN:    Oral Fluid: 320 mL  Total IN: 320 mL    OUT:    Voided (mL): 200 mL  Total OUT: 200 mL    Total NET: 120 mL        PHYSICAL EXAM:  GEN: NAD, looks comfortable. On room air.    Psych: Mood appropriate  Neuro: A&Ox3.  No focal deficits.  Moving all extremities.   HEENT: No obvious abnormalities  CV: S1S2, regular, no murmurs appreciated.  No carotid bruits.  No JVD  Lungs: Clear B/L.  No wheezing, rales or rhonchi  ABD: Soft, non-tender, non-distended.  +Bowel sounds  EXT: Warm and well perfused.  No peripheral edema noted  Musculoskeletal: Moving all extremities with normal ROM, no joint swelling  PV: Pedal pulses palpable      LABS:                        10.9   8.33  )-----------( 267      ( 29 Sep 2021 06:51 )             33.9       PT/INR - ( 28 Sep 2021 15:58 )   PT: 12.5 sec;   INR: 1.04 ratio         PTT - ( 29 Sep 2021 06:51 )  PTT:58.9 sec        138  |  100  |  36<H>  ----------------------------<  201<H>  4.3   |  25  |  1.00    Ca    8.9      29 Sep 2021 06:51  Phos  3.7       Mg     1.70         TPro  7.4  /  Alb  4.0  /  TBili  0.4  /  DBili  x   /  AST  25  /  ALT  13  /  AlkPhos  81        Urinalysis Basic - ( 28 Sep 2021 18:56 )    Color: Light Yellow / Appearance: Clear / S.013 / pH: x  Gluc: x / Ketone: Negative  / Bili: Negative / Urobili: Negative   Blood: x / Protein: Negative / Nitrite: Negative   Leuk Esterase: Large / RBC: 1 /hpf / WBC 12 /HPF   Sq Epi: x / Non Sq Epi: 3 /hpf / Bacteria: Negative        MEDICATIONS  (STANDING):  aspirin enteric coated 81 milliGRAM(s) Oral daily  atorvastatin 40 milliGRAM(s) Oral at bedtime  carvedilol 25 milliGRAM(s) Oral every 12 hours  heparin  Infusion. 450 Unit(s)/Hr (4.5 mL/Hr) IV Continuous <Continuous>  hydrALAZINE 10 milliGRAM(s) Oral every 8 hours  hydrochlorothiazide 12.5 milliGRAM(s) Oral daily  influenza   Vaccine 0.5 milliLiter(s) IntraMuscular once  insulin lispro (ADMELOG) corrective regimen sliding scale   SubCutaneous three times a day before meals  insulin lispro (ADMELOG) corrective regimen sliding scale   SubCutaneous at bedtime  sodium chloride 0.9% lock flush 3 milliLiter(s) IV Push every 8 hours    MEDICATIONS  (PRN):        RADIOLOGY & ADDITIONAL TESTS:    < from: Xray Chest 1 View- PORTABLE-Urgent (Xray Chest 1 View- PORTABLE-Urgent .) (21 @ 03:41) >    IMPRESSION: Increased pulmonary vascular congestion noted. Questionable increased airspace opacity noted in the right lower lobe which may represent superimposed pneumonia. Clinical correlation and follow-up examination are recommended.    < end of copied text >    e< from: Transthoracic Echocardiogram (21 @ 16:51) >  Conclusions:  1. Mitral annular calcificationwith calcified chordae and  calcified mitral leaflets with normal diastolic opening.  Minimal mitral regurgitation.  2. Aortic valve not well visualized; appears calcified with  decreased opening. Peak transaortic valve gradient equals  13 mm Hg, mean transaortic valve gradient equals 8 mm Hg,  estimated aortic valve area equals 1.6 sqcm (by continuity  equation), aortic valve velocity time integral equals 38  cm, consistent with mild aortic stenosis. Minimal aortic  regurgitation.  3. Endocardium not well visualized; grossly preserved left  ventricular systolic function with mild segmental wall  motion abnormalities. The apical septum and the apical  inferior segments appear hypokinetic. Recommend limited  repeat imaging with intravenous echo contrast to better  evaluate the LV. Patient did not agree to IV echo contrast  at the time of the atudy.  4. Normal right ventricular size and function.  *** No previous Echo exam.    < end of copied text >    < from: VA Duplex Carotid, Bilat (21 @ 16:53) >  Antegrade flow is noted within both vertebral arteries.    IMPRESSION: No significant hemodynamic stenosis of either carotid artery.    Measurement of carotid stenosis is based on velocity parameters that correlate the residual internal carotid diameter with that of the more distal vessel in accordance with a method such as the [employer] Symptomatic Carotid Endarterectomy Trial (NASCET).    --- End of Report ---      < end of copied text >

## 2021-09-29 NOTE — PROGRESS NOTE ADULT - PROBLEM SELECTOR PLAN 1
Continue pre-op cardiac surgery workup  Continue asa 81 daily, carvedilol 25 BID, atorvastatin 40 HS  Continue Heparin drip- Ptt 58 today, no changes made.   Carotids and TTE done, results above.   Plan for OR Friday 10/1 with Dr. Edmond  **Needs updated COVID swab and T&S tomorrow**

## 2021-09-30 PROBLEM — I10 ESSENTIAL (PRIMARY) HYPERTENSION: Chronic | Status: ACTIVE | Noted: 2021-09-27

## 2021-09-30 PROBLEM — I21.4 NON-ST ELEVATION (NSTEMI) MYOCARDIAL INFARCTION: Chronic | Status: ACTIVE | Noted: 2021-09-27

## 2021-09-30 PROBLEM — E78.5 HYPERLIPIDEMIA, UNSPECIFIED: Chronic | Status: ACTIVE | Noted: 2021-09-27

## 2021-09-30 LAB
ANION GAP SERPL CALC-SCNC: 12 MMOL/L — SIGNIFICANT CHANGE UP (ref 5–17)
APPEARANCE UR: CLEAR — SIGNIFICANT CHANGE UP
APTT BLD: 50.9 SEC — HIGH (ref 27.5–35.5)
APTT BLD: 54.8 SEC — HIGH (ref 27.5–35.5)
APTT BLD: 58 SEC — HIGH (ref 27.5–35.5)
BACTERIA # UR AUTO: NEGATIVE — SIGNIFICANT CHANGE UP
BILIRUB UR-MCNC: NEGATIVE — SIGNIFICANT CHANGE UP
BUN SERPL-MCNC: 32 MG/DL — HIGH (ref 7–23)
CALCIUM SERPL-MCNC: 8.9 MG/DL — SIGNIFICANT CHANGE UP (ref 8.4–10.5)
CHLORIDE SERPL-SCNC: 102 MMOL/L — SIGNIFICANT CHANGE UP (ref 96–108)
CO2 SERPL-SCNC: 24 MMOL/L — SIGNIFICANT CHANGE UP (ref 22–31)
COLOR SPEC: SIGNIFICANT CHANGE UP
CREAT SERPL-MCNC: 0.94 MG/DL — SIGNIFICANT CHANGE UP (ref 0.5–1.3)
DIFF PNL FLD: NEGATIVE — SIGNIFICANT CHANGE UP
EPI CELLS # UR: 6 /HPF — HIGH
GLUCOSE BLDC GLUCOMTR-MCNC: 165 MG/DL — HIGH (ref 70–99)
GLUCOSE BLDC GLUCOMTR-MCNC: 167 MG/DL — HIGH (ref 70–99)
GLUCOSE BLDC GLUCOMTR-MCNC: 190 MG/DL — HIGH (ref 70–99)
GLUCOSE BLDC GLUCOMTR-MCNC: 239 MG/DL — HIGH (ref 70–99)
GLUCOSE BLDC GLUCOMTR-MCNC: 249 MG/DL — HIGH (ref 70–99)
GLUCOSE SERPL-MCNC: 186 MG/DL — HIGH (ref 70–99)
GLUCOSE UR QL: NEGATIVE — SIGNIFICANT CHANGE UP
HCT VFR BLD CALC: 33.1 % — LOW (ref 34.5–45)
HGB BLD-MCNC: 10.6 G/DL — LOW (ref 11.5–15.5)
HYALINE CASTS # UR AUTO: 1 /LPF — SIGNIFICANT CHANGE UP (ref 0–2)
INR BLD: 0.95 RATIO — SIGNIFICANT CHANGE UP (ref 0.88–1.16)
KETONES UR-MCNC: NEGATIVE — SIGNIFICANT CHANGE UP
LEUKOCYTE ESTERASE UR-ACNC: ABNORMAL
MAGNESIUM SERPL-MCNC: 2 MG/DL — SIGNIFICANT CHANGE UP (ref 1.6–2.6)
MCHC RBC-ENTMCNC: 28.6 PG — SIGNIFICANT CHANGE UP (ref 27–34)
MCHC RBC-ENTMCNC: 32 GM/DL — SIGNIFICANT CHANGE UP (ref 32–36)
MCV RBC AUTO: 89.5 FL — SIGNIFICANT CHANGE UP (ref 80–100)
NITRITE UR-MCNC: NEGATIVE — SIGNIFICANT CHANGE UP
NRBC # BLD: 0 /100 WBCS — SIGNIFICANT CHANGE UP (ref 0–0)
PH UR: 7 — SIGNIFICANT CHANGE UP (ref 5–8)
PLATELET # BLD AUTO: 254 K/UL — SIGNIFICANT CHANGE UP (ref 150–400)
POTASSIUM SERPL-MCNC: 4.5 MMOL/L — SIGNIFICANT CHANGE UP (ref 3.5–5.3)
POTASSIUM SERPL-SCNC: 4.5 MMOL/L — SIGNIFICANT CHANGE UP (ref 3.5–5.3)
PROT UR-MCNC: NEGATIVE — SIGNIFICANT CHANGE UP
PROTHROM AB SERPL-ACNC: 11.4 SEC — SIGNIFICANT CHANGE UP (ref 10.6–13.6)
RBC # BLD: 3.7 M/UL — LOW (ref 3.8–5.2)
RBC # FLD: 13.2 % — SIGNIFICANT CHANGE UP (ref 10.3–14.5)
RBC CASTS # UR COMP ASSIST: 1 /HPF — SIGNIFICANT CHANGE UP (ref 0–4)
SARS-COV-2 RNA SPEC QL NAA+PROBE: SIGNIFICANT CHANGE UP
SODIUM SERPL-SCNC: 138 MMOL/L — SIGNIFICANT CHANGE UP (ref 135–145)
SP GR SPEC: 1.02 — SIGNIFICANT CHANGE UP (ref 1.01–1.02)
UROBILINOGEN FLD QL: NEGATIVE — SIGNIFICANT CHANGE UP
WBC # BLD: 8.61 K/UL — SIGNIFICANT CHANGE UP (ref 3.8–10.5)
WBC # FLD AUTO: 8.61 K/UL — SIGNIFICANT CHANGE UP (ref 3.8–10.5)
WBC UR QL: 11 /HPF — HIGH (ref 0–5)

## 2021-09-30 RX ORDER — INSULIN LISPRO 100/ML
VIAL (ML) SUBCUTANEOUS
Refills: 0 | Status: DISCONTINUED | OUTPATIENT
Start: 2021-09-30 | End: 2021-10-01

## 2021-09-30 RX ORDER — DEXTROSE 50 % IN WATER 50 %
12.5 SYRINGE (ML) INTRAVENOUS ONCE
Refills: 0 | Status: DISCONTINUED | OUTPATIENT
Start: 2021-09-30 | End: 2021-10-01

## 2021-09-30 RX ORDER — SODIUM CHLORIDE 9 MG/ML
1000 INJECTION INTRAMUSCULAR; INTRAVENOUS; SUBCUTANEOUS
Refills: 0 | Status: DISCONTINUED | OUTPATIENT
Start: 2021-10-01 | End: 2021-10-06

## 2021-09-30 RX ORDER — ACETAMINOPHEN 500 MG
1000 TABLET ORAL ONCE
Refills: 0 | Status: COMPLETED | OUTPATIENT
Start: 2021-09-30 | End: 2021-10-01

## 2021-09-30 RX ORDER — DEXTROSE 50 % IN WATER 50 %
50 SYRINGE (ML) INTRAVENOUS
Refills: 0 | Status: DISCONTINUED | OUTPATIENT
Start: 2021-10-01 | End: 2021-10-03

## 2021-09-30 RX ORDER — DEXTROSE 50 % IN WATER 50 %
25 SYRINGE (ML) INTRAVENOUS ONCE
Refills: 0 | Status: DISCONTINUED | OUTPATIENT
Start: 2021-09-30 | End: 2021-10-01

## 2021-09-30 RX ORDER — CHLORHEXIDINE GLUCONATE 213 G/1000ML
15 SOLUTION TOPICAL ONCE
Refills: 0 | Status: COMPLETED | OUTPATIENT
Start: 2021-09-30 | End: 2021-10-01

## 2021-09-30 RX ORDER — POLYETHYLENE GLYCOL 3350 17 G/17G
17 POWDER, FOR SOLUTION ORAL DAILY
Refills: 0 | Status: DISCONTINUED | OUTPATIENT
Start: 2021-10-01 | End: 2021-10-06

## 2021-09-30 RX ORDER — ASPIRIN/CALCIUM CARB/MAGNESIUM 324 MG
300 TABLET ORAL ONCE
Refills: 0 | Status: DISCONTINUED | OUTPATIENT
Start: 2021-10-01 | End: 2021-10-01

## 2021-09-30 RX ORDER — CHLORHEXIDINE GLUCONATE 213 G/1000ML
1 SOLUTION TOPICAL ONCE
Refills: 0 | Status: COMPLETED | OUTPATIENT
Start: 2021-09-30 | End: 2021-09-30

## 2021-09-30 RX ORDER — GABAPENTIN 400 MG/1
300 CAPSULE ORAL ONCE
Refills: 0 | Status: DISCONTINUED | OUTPATIENT
Start: 2021-09-30 | End: 2021-09-30

## 2021-09-30 RX ORDER — PANTOPRAZOLE SODIUM 20 MG/1
40 TABLET, DELAYED RELEASE ORAL DAILY
Refills: 0 | Status: DISCONTINUED | OUTPATIENT
Start: 2021-10-01 | End: 2021-10-02

## 2021-09-30 RX ORDER — HEPARIN SODIUM 5000 [USP'U]/ML
600 INJECTION INTRAVENOUS; SUBCUTANEOUS
Qty: 25000 | Refills: 0 | Status: DISCONTINUED | OUTPATIENT
Start: 2021-09-30 | End: 2021-10-01

## 2021-09-30 RX ORDER — ASCORBIC ACID 60 MG
2000 TABLET,CHEWABLE ORAL ONCE
Refills: 0 | Status: COMPLETED | OUTPATIENT
Start: 2021-09-30 | End: 2021-09-30

## 2021-09-30 RX ORDER — INSULIN HUMAN 100 [IU]/ML
3 INJECTION, SOLUTION SUBCUTANEOUS
Qty: 100 | Refills: 0 | Status: DISCONTINUED | OUTPATIENT
Start: 2021-10-01 | End: 2021-10-02

## 2021-09-30 RX ORDER — DEXTROSE 50 % IN WATER 50 %
25 SYRINGE (ML) INTRAVENOUS
Refills: 0 | Status: DISCONTINUED | OUTPATIENT
Start: 2021-10-01 | End: 2021-10-01

## 2021-09-30 RX ORDER — INSULIN GLARGINE 100 [IU]/ML
8 INJECTION, SOLUTION SUBCUTANEOUS AT BEDTIME
Refills: 0 | Status: DISCONTINUED | OUTPATIENT
Start: 2021-09-30 | End: 2021-10-01

## 2021-09-30 RX ORDER — CHLORHEXIDINE GLUCONATE 213 G/1000ML
1 SOLUTION TOPICAL
Refills: 0 | Status: DISCONTINUED | OUTPATIENT
Start: 2021-10-01 | End: 2021-10-06

## 2021-09-30 RX ORDER — INSULIN LISPRO 100/ML
5 VIAL (ML) SUBCUTANEOUS
Refills: 0 | Status: DISCONTINUED | OUTPATIENT
Start: 2021-09-30 | End: 2021-10-01

## 2021-09-30 RX ORDER — GABAPENTIN 400 MG/1
200 CAPSULE ORAL ONCE
Refills: 0 | Status: COMPLETED | OUTPATIENT
Start: 2021-10-01 | End: 2021-10-01

## 2021-09-30 RX ORDER — ASPIRIN/CALCIUM CARB/MAGNESIUM 324 MG
325 TABLET ORAL DAILY
Refills: 0 | Status: DISCONTINUED | OUTPATIENT
Start: 2021-10-01 | End: 2021-10-01

## 2021-09-30 RX ORDER — INSULIN LISPRO 100/ML
VIAL (ML) SUBCUTANEOUS AT BEDTIME
Refills: 0 | Status: DISCONTINUED | OUTPATIENT
Start: 2021-09-30 | End: 2021-10-01

## 2021-09-30 RX ORDER — DEXTROSE 50 % IN WATER 50 %
15 SYRINGE (ML) INTRAVENOUS ONCE
Refills: 0 | Status: DISCONTINUED | OUTPATIENT
Start: 2021-09-30 | End: 2021-10-01

## 2021-09-30 RX ORDER — CEFUROXIME AXETIL 250 MG
1500 TABLET ORAL ONCE
Refills: 0 | Status: COMPLETED | OUTPATIENT
Start: 2021-09-30 | End: 2021-10-01

## 2021-09-30 RX ORDER — CHLORHEXIDINE GLUCONATE 213 G/1000ML
15 SOLUTION TOPICAL EVERY 12 HOURS
Refills: 0 | Status: DISCONTINUED | OUTPATIENT
Start: 2021-10-01 | End: 2021-10-01

## 2021-09-30 RX ORDER — GLUCAGON INJECTION, SOLUTION 0.5 MG/.1ML
1 INJECTION, SOLUTION SUBCUTANEOUS ONCE
Refills: 0 | Status: DISCONTINUED | OUTPATIENT
Start: 2021-09-30 | End: 2021-10-01

## 2021-09-30 RX ADMIN — HEPARIN SODIUM 8 UNIT(S)/HR: 5000 INJECTION INTRAVENOUS; SUBCUTANEOUS at 19:45

## 2021-09-30 RX ADMIN — SODIUM CHLORIDE 3 MILLILITER(S): 9 INJECTION INTRAMUSCULAR; INTRAVENOUS; SUBCUTANEOUS at 05:48

## 2021-09-30 RX ADMIN — Medication 5 UNIT(S): at 17:02

## 2021-09-30 RX ADMIN — SODIUM CHLORIDE 3 MILLILITER(S): 9 INJECTION INTRAMUSCULAR; INTRAVENOUS; SUBCUTANEOUS at 22:11

## 2021-09-30 RX ADMIN — Medication 10 MILLIGRAM(S): at 13:10

## 2021-09-30 RX ADMIN — Medication 10 MILLIGRAM(S): at 06:32

## 2021-09-30 RX ADMIN — HEPARIN SODIUM 6 UNIT(S)/HR: 5000 INJECTION INTRAVENOUS; SUBCUTANEOUS at 02:26

## 2021-09-30 RX ADMIN — SODIUM CHLORIDE 3 MILLILITER(S): 9 INJECTION INTRAMUSCULAR; INTRAVENOUS; SUBCUTANEOUS at 13:26

## 2021-09-30 RX ADMIN — Medication 12.5 MILLIGRAM(S): at 06:32

## 2021-09-30 RX ADMIN — Medication 10 MILLIGRAM(S): at 22:40

## 2021-09-30 RX ADMIN — CHLORHEXIDINE GLUCONATE 1 APPLICATION(S): 213 SOLUTION TOPICAL at 22:39

## 2021-09-30 RX ADMIN — INSULIN GLARGINE 8 UNIT(S): 100 INJECTION, SOLUTION SUBCUTANEOUS at 22:41

## 2021-09-30 RX ADMIN — Medication 4: at 12:23

## 2021-09-30 RX ADMIN — Medication 1: at 17:01

## 2021-09-30 RX ADMIN — ATORVASTATIN CALCIUM 40 MILLIGRAM(S): 80 TABLET, FILM COATED ORAL at 22:41

## 2021-09-30 RX ADMIN — CARVEDILOL PHOSPHATE 25 MILLIGRAM(S): 80 CAPSULE, EXTENDED RELEASE ORAL at 06:32

## 2021-09-30 RX ADMIN — CARVEDILOL PHOSPHATE 25 MILLIGRAM(S): 80 CAPSULE, EXTENDED RELEASE ORAL at 17:01

## 2021-09-30 RX ADMIN — Medication 2000 MILLIGRAM(S): at 22:40

## 2021-09-30 RX ADMIN — HEPARIN SODIUM 7 UNIT(S)/HR: 5000 INJECTION INTRAVENOUS; SUBCUTANEOUS at 13:11

## 2021-09-30 RX ADMIN — Medication 81 MILLIGRAM(S): at 08:12

## 2021-09-30 RX ADMIN — Medication 4: at 08:01

## 2021-09-30 NOTE — PROGRESS NOTE ADULT - ASSESSMENT
This is a 72 y/o female with PMHx of HTN, DM type 2 who presented to [facility] ED 9/25/21 complaining of worsening SOB and chest discomfort. Patient R/I NSTEMI and pulmonary edema with ECHO revealed EF 35%. Patient was transferred to [facility] 9/27/21 for a cardiac catheterization with possible PTCA/stent. Pt s/p 9/27 CATH: LVEDP 10, ostial LAD 95, OM 90, RPDA 90; RRA access. Pt transferred to [facility] for CABG evaluation and further management.  She was deemed a surgical candidate and is now pre-op for CABG Friday 10/1/21.    9/30 No events overnight; COVID swab and T&S tomorrow.  Echo, CXR, carotids, PFTs done.   OR friday w/ Dr. Edmond

## 2021-09-30 NOTE — CONSULT NOTE ADULT - SUBJECTIVE AND OBJECTIVE BOX
HPI:  This is a 70 y/o female with PMHx of HTN, DM type 2 who presented to [facility] ED 9/25/21 complaining of worsening SOB and chest discomfort. Patient R/I NSTEMI and pulmonary edema with ECHo revealing EF 35%. Patient was transferred to [facility] 9/27/21 for a cardiac catheterization with possible PTCA/stent. Pt s/p 9/27 CATH: LVEDP 10, ostial LAD 95, OM 90, RPDA 90; RRA access, now transferred to [facility] for CABG evaluation and further management.      (28 Sep 2021 15:18)  Patient has history of diabetes, A1C 7.5%, on oral meds at home (Janumet 50/1000 BID, Glipizide 2.5mg daily AM), reports -180 with intermittent hypoglycemias, no polyuria polydipsia. Patient follows up with PCP for diabetes management.  Endo was consulted for perioperative glycemic control.    PAST MEDICAL & SURGICAL HISTORY:  Diabetes    Hypertension    Hyperlipidemia    NSTEMI (non-ST elevation myocardial infarction)    HTN (hypertension)    S/P cataract surgery        FAMILY HISTORY:  Family history of hypertension (Mother)    Family history of diabetes mellitus (Father)    Family history of coronary artery disease (Mother)        Social History:    Outpatient Medications:    MEDICATIONS  (STANDING):  acetaminophen   Tablet .. 1000 milliGRAM(s) Oral once  ascorbic acid 2000 milliGRAM(s) Oral once  aspirin enteric coated 81 milliGRAM(s) Oral daily  atorvastatin 40 milliGRAM(s) Oral at bedtime  carvedilol 25 milliGRAM(s) Oral every 12 hours  cefuroxime  IVPB 1500 milliGRAM(s) IV Intermittent once  chlorhexidine 0.12% Liquid 15 milliLiter(s) Swish and Spit once  chlorhexidine 4% Liquid 1 Application(s) Topical once  dextrose 40% Gel 15 Gram(s) Oral once  dextrose 50% Injectable 25 Gram(s) IV Push once  dextrose 50% Injectable 12.5 Gram(s) IV Push once  dextrose 50% Injectable 25 Gram(s) IV Push once  glucagon  Injectable 1 milliGRAM(s) IntraMuscular once  heparin  Infusion 600 Unit(s)/Hr (7 mL/Hr) IV Continuous <Continuous>  hydrALAZINE 10 milliGRAM(s) Oral every 8 hours  hydrochlorothiazide 12.5 milliGRAM(s) Oral daily  influenza   Vaccine 0.5 milliLiter(s) IntraMuscular once  insulin glargine Injectable (LANTUS) 8 Unit(s) SubCutaneous at bedtime  insulin lispro (ADMELOG) corrective regimen sliding scale   SubCutaneous three times a day before meals  insulin lispro (ADMELOG) corrective regimen sliding scale   SubCutaneous at bedtime  insulin lispro Injectable (ADMELOG) 5 Unit(s) SubCutaneous three times a day before meals  sodium chloride 0.9% lock flush 3 milliLiter(s) IV Push every 8 hours    MEDICATIONS  (PRN):      Allergies    Aleve (Rash)  lisinopril (Rash)    Intolerances      Review of Systems:  Constitutional: No fever, no chills  Eyes: No blurry vision  Neuro: No tremors  HEENT: No pain, no neck swelling  Cardiovascular: No chest pain, no palpitations  Respiratory: Has SOB, no cough  GI: No nausea, vomiting, abdominal pain  : No dysuria  Skin: no rash  MSK: Has leg swelling.  Psych: no depression  Endocrine: no polyuria, polydipsia    ALL OTHER SYSTEMS REVIEWED AND NEGATIVE    UNABLE TO OBTAIN    PHYSICAL EXAM:  VITALS: T(C): 36.6 (09-30-21 @ 11:46)  T(F): 97.8 (09-30-21 @ 11:46), Max: 98.1 (09-30-21 @ 06:27)  HR: 73 (09-30-21 @ 11:46) (66 - 73)  BP: 136/79 (09-30-21 @ 11:46) (112/68 - 144/90)  RR:  (18 - 18)  SpO2:  (98% - 100%)  Wt(kg): --  GENERAL: NAD, well-groomed, well-developed  EYES: No proptosis, no lid lag  HEENT:  Atraumatic, Normocephalic  THYROID: Normal size, no palpable nodules  RESPIRATORY: Clear to auscultation bilaterally; No rales, rhonchi, wheezing  CARDIOVASCULAR: Si S2, No murmurs;  GI: Soft, non distended, normal bowel sounds  SKIN: Dry, intact, No rashes or lesions  MUSCULOSKELETAL: Has BL lower extremity edema.  NEURO:  no tremor, sensation decreased in feet BL,    POCT Blood Glucose.: 249 mg/dL (09-30-21 @ 12:15)  POCT Blood Glucose.: 165 mg/dL (09-30-21 @ 11:05)  POCT Blood Glucose.: 239 mg/dL (09-30-21 @ 07:41)  POCT Blood Glucose.: 245 mg/dL (09-29-21 @ 21:12)  POCT Blood Glucose.: 122 mg/dL (09-29-21 @ 17:00)  POCT Blood Glucose.: 241 mg/dL (09-29-21 @ 11:34)  POCT Blood Glucose.: 156 mg/dL (09-29-21 @ 07:20)  POCT Blood Glucose.: 166 mg/dL (09-28-21 @ 21:41)  POCT Blood Glucose.: 187 mg/dL (09-28-21 @ 17:14)  POCT Blood Glucose.: 176 mg/dL (09-28-21 @ 13:20)  POCT Blood Glucose.: 184 mg/dL (09-28-21 @ 09:09)  POCT Blood Glucose.: 212 mg/dL (09-27-21 @ 21:58)  POCT Blood Glucose.: 127 mg/dL (09-27-21 @ 17:54)                            10.6   8.61  )-----------( 254      ( 30 Sep 2021 07:08 )             33.1       09-30    138  |  102  |  32<H>  ----------------------------<  186<H>  4.5   |  24  |  0.94    EGFR if : 70  EGFR if non : 60    Ca    8.9      09-30  Mg     2.0     09-30  Phos  3.7     09-28    TPro  7.4  /  Alb  4.0  /  TBili  0.4  /  DBili  x   /  AST  25  /  ALT  13  /  AlkPhos  81  09-28      Thyroid Function Tests:  09-29 @ 00:55 TSH 2.03 FreeT4 -- T3 105 Anti TPO -- Anti Thyroglobulin Ab -- TSI --              Radiology:              HPI:  This is a 72 y/o female with PMHx of HTN, DM type 2 who presented to [facility] ED 9/25/21 complaining of worsening SOB and chest discomfort. Patient R/I NSTEMI and pulmonary edema with ECHo revealing EF 35%. Patient was transferred to [facility] 9/27/21 for a cardiac catheterization with possible PTCA/stent. Pt s/p 9/27 CATH: LVEDP 10, ostial LAD 95, OM 90, RPDA 90; RRA access, now transferred to [facility] for CABG evaluation and further management.      (28 Sep 2021 15:18)  Patient has history of diabetes, A1C 7.5%, on oral meds at home (Janumet 50/1000 BID, Glipizide 2.5mg daily AM), reports -180 with intermittent hypoglycemias, no polyuria polydipsia. Patient follows up with PCP for diabetes management.  Endo was consulted for perioperative glycemic control.    PAST MEDICAL & SURGICAL HISTORY:  Diabetes    Hypertension    Hyperlipidemia    NSTEMI (non-ST elevation myocardial infarction)    HTN (hypertension)    S/P cataract surgery        FAMILY HISTORY:  Family history of hypertension (Mother)    Family history of diabetes mellitus (Father)    Family history of coronary artery disease (Mother)        Social History:    Outpatient Medications:    MEDICATIONS  (STANDING):  acetaminophen   Tablet .. 1000 milliGRAM(s) Oral once  ascorbic acid 2000 milliGRAM(s) Oral once  aspirin enteric coated 81 milliGRAM(s) Oral daily  atorvastatin 40 milliGRAM(s) Oral at bedtime  carvedilol 25 milliGRAM(s) Oral every 12 hours  cefuroxime  IVPB 1500 milliGRAM(s) IV Intermittent once  chlorhexidine 0.12% Liquid 15 milliLiter(s) Swish and Spit once  chlorhexidine 4% Liquid 1 Application(s) Topical once  dextrose 40% Gel 15 Gram(s) Oral once  dextrose 50% Injectable 25 Gram(s) IV Push once  dextrose 50% Injectable 12.5 Gram(s) IV Push once  dextrose 50% Injectable 25 Gram(s) IV Push once  glucagon  Injectable 1 milliGRAM(s) IntraMuscular once  heparin  Infusion 600 Unit(s)/Hr (7 mL/Hr) IV Continuous <Continuous>  hydrALAZINE 10 milliGRAM(s) Oral every 8 hours  hydrochlorothiazide 12.5 milliGRAM(s) Oral daily  influenza   Vaccine 0.5 milliLiter(s) IntraMuscular once  insulin glargine Injectable (LANTUS) 8 Unit(s) SubCutaneous at bedtime  insulin lispro (ADMELOG) corrective regimen sliding scale   SubCutaneous three times a day before meals  insulin lispro (ADMELOG) corrective regimen sliding scale   SubCutaneous at bedtime  insulin lispro Injectable (ADMELOG) 5 Unit(s) SubCutaneous three times a day before meals  sodium chloride 0.9% lock flush 3 milliLiter(s) IV Push every 8 hours    MEDICATIONS  (PRN):      Allergies    Aleve (Rash)  lisinopril (Rash)    Intolerances      Review of Systems:  Constitutional: No fever, no chills  Eyes: No blurry vision  Neuro: No tremors  HEENT: No pain, no neck swelling  Cardiovascular: No chest pain, no palpitations  Respiratory: Has SOB, no cough  GI: No nausea, vomiting, abdominal pain  : No dysuria  Skin: no rash  MSK: Has leg swelling.  Psych: no depression  Endocrine: no polyuria, polydipsia    ALL OTHER SYSTEMS REVIEWED AND NEGATIVE    UNABLE TO OBTAIN    PHYSICAL EXAM:  VITALS: T(C): 36.6 (09-30-21 @ 11:46)  T(F): 97.8 (09-30-21 @ 11:46), Max: 98.1 (09-30-21 @ 06:27)  HR: 73 (09-30-21 @ 11:46) (66 - 73)  BP: 136/79 (09-30-21 @ 11:46) (112/68 - 144/90)  RR:  (18 - 18)  SpO2:  (98% - 100%)  Wt(kg): --  GENERAL: NAD, well-groomed, well-developed  EYES: No proptosis, no lid lag  HEENT:  Atraumatic, Normocephalic  THYROID: Normal size, no palpable nodules  RESPIRATORY: Clear to auscultation bilaterally; No rales, rhonchi, wheezing  CARDIOVASCULAR: Si S2, No murmurs;  GI: Soft, non distended, normal bowel sounds  SKIN: Dry, intact, No rashes or lesions  MUSCULOSKELETAL: Has BL lower extremity edema.  NEURO:  no tremor, sensation decreased in feet BL,    POCT Blood Glucose.: 249 mg/dL (09-30-21 @ 12:15)  POCT Blood Glucose.: 165 mg/dL (09-30-21 @ 11:05)  POCT Blood Glucose.: 239 mg/dL (09-30-21 @ 07:41)  POCT Blood Glucose.: 245 mg/dL (09-29-21 @ 21:12)  POCT Blood Glucose.: 122 mg/dL (09-29-21 @ 17:00)  POCT Blood Glucose.: 241 mg/dL (09-29-21 @ 11:34)  POCT Blood Glucose.: 156 mg/dL (09-29-21 @ 07:20)  POCT Blood Glucose.: 166 mg/dL (09-28-21 @ 21:41)  POCT Blood Glucose.: 187 mg/dL (09-28-21 @ 17:14)  POCT Blood Glucose.: 176 mg/dL (09-28-21 @ 13:20)  POCT Blood Glucose.: 184 mg/dL (09-28-21 @ 09:09)  POCT Blood Glucose.: 212 mg/dL (09-27-21 @ 21:58)  POCT Blood Glucose.: 127 mg/dL (09-27-21 @ 17:54)                            10.6   8.61  )-----------( 254      ( 30 Sep 2021 07:08 )             33.1       09-30    138  |  102  |  32<H>  ----------------------------<  186<H>  4.5   |  24  |  0.94    EGFR if : 70  EGFR if non : 60    Ca    8.9      09-30  Mg     2.0     09-30  Phos  3.7     09-28    TPro  7.4  /  Alb  4.0  /  TBili  0.4  /  DBili  x   /  AST  25  /  ALT  13  /  AlkPhos  81  09-28      Thyroid Function Tests:  09-29 @ 00:55 TSH 2.03 FreeT4 -- T3 105 Anti TPO -- Anti Thyroglobulin Ab -- TSI --              Radiology:

## 2021-09-30 NOTE — CONSULT NOTE ADULT - PROBLEM SELECTOR RECOMMENDATION 9
Will start Lantus 8u at bedtime.  Will start Admelog 5u before each meal and adjust Admelog correction scale coverage to be low-scale ac/hs. Will continue monitoring FS and FU.  Discussed plan with patient and family at bedside.  Patient counseled for compliance with consistent low carb diet and exercise as tolerated outpatient.

## 2021-09-30 NOTE — CONSULT NOTE ADULT - ASSESSMENT
Assessment  DMT2: 71y Female with DM T2 with hyperglycemia, A1C 7.5%, was on oral meds at home, now on moderate-scale insulin coverage, blood sugars are fluctuating/running high and not at preop target, no hypoglycemic episodes, eating meals, appears alert and comfortable, planning CABG tomorrow.  CAD: Planning CABG, on medications, no chest pain, stable, monitored.  HTN: Controlled,  on antihypertensive medications.  HLD: On statin      Noam Ng MD  Cell: [phone]  Office: [phone]     Assessment  DMT2: 71y Female with DM T2 with hyperglycemia, A1C 7.5%, was on oral meds at home, now  on moderate-scale insulin coverage, blood sugars are fluctuating/running high and not at preop target, no hypoglycemic episodes, eating meals, appears alert and comfortable, planning CABG tomorrow.  CAD: Planning CABG, on medications, no chest pain, stable, monitored.  HTN: Controlled,  on antihypertensive medications.  HLD: On statin      Noam Ng MD  Cell: [phone]  Office: [phone]

## 2021-10-01 ENCOUNTER — APPOINTMENT (OUTPATIENT)
Dept: CARDIOTHORACIC SURGERY | Facility: HOSPITAL | Age: 71
End: 2021-10-01

## 2021-10-01 LAB
ALBUMIN SERPL ELPH-MCNC: 1.9 G/DL — LOW (ref 3.3–5)
ALP SERPL-CCNC: 29 U/L — LOW (ref 40–120)
ALT FLD-CCNC: 25 U/L — SIGNIFICANT CHANGE UP (ref 10–45)
ANION GAP SERPL CALC-SCNC: 14 MMOL/L — SIGNIFICANT CHANGE UP (ref 5–17)
APTT BLD: 47.4 SEC — HIGH (ref 27.5–35.5)
AST SERPL-CCNC: 50 U/L — HIGH (ref 10–40)
BASE EXCESS BLDMV CALC-SCNC: -0.5 MMOL/L — SIGNIFICANT CHANGE UP (ref -3–3)
BASE EXCESS BLDMV CALC-SCNC: 2.5 MMOL/L — SIGNIFICANT CHANGE UP (ref -3–3)
BASE EXCESS BLDMV CALC-SCNC: 2.5 MMOL/L — SIGNIFICANT CHANGE UP (ref -3–3)
BASE EXCESS BLDMV CALC-SCNC: 3.1 MMOL/L — HIGH (ref -3–3)
BASE EXCESS BLDV CALC-SCNC: -7.3 MMOL/L — LOW (ref -2–2)
BASE EXCESS BLDV CALC-SCNC: 2.2 MMOL/L — HIGH (ref -2–2)
BASE EXCESS BLDV CALC-SCNC: 3.8 MMOL/L — HIGH (ref -2–2)
BASOPHILS # BLD AUTO: 0.03 K/UL — SIGNIFICANT CHANGE UP (ref 0–0.2)
BASOPHILS NFR BLD AUTO: 0.2 % — SIGNIFICANT CHANGE UP (ref 0–2)
BILIRUB SERPL-MCNC: 0.5 MG/DL — SIGNIFICANT CHANGE UP (ref 0.2–1.2)
BLOOD GAS VENOUS - CREATININE: SIGNIFICANT CHANGE UP MG/DL (ref 0.5–1.3)
BUN SERPL-MCNC: 22 MG/DL — SIGNIFICANT CHANGE UP (ref 7–23)
CA-I SERPL-SCNC: 0.76 MMOL/L — LOW (ref 1.15–1.33)
CA-I SERPL-SCNC: 0.86 MMOL/L — LOW (ref 1.15–1.33)
CA-I SERPL-SCNC: 0.87 MMOL/L — LOW (ref 1.15–1.33)
CALCIUM SERPL-MCNC: 7.4 MG/DL — LOW (ref 8.4–10.5)
CHLORIDE BLDV-SCNC: 103 MMOL/L — SIGNIFICANT CHANGE UP (ref 96–108)
CHLORIDE BLDV-SCNC: 104 MMOL/L — SIGNIFICANT CHANGE UP (ref 96–108)
CHLORIDE BLDV-SCNC: 99 MMOL/L — SIGNIFICANT CHANGE UP (ref 96–108)
CHLORIDE SERPL-SCNC: 105 MMOL/L — SIGNIFICANT CHANGE UP (ref 96–108)
CK MB BLD-MCNC: 11.9 % — HIGH (ref 0–3.5)
CK MB BLD-MCNC: 15.7 % — HIGH (ref 0–3.5)
CK MB CFR SERPL CALC: 42.1 NG/ML — HIGH (ref 0–3.8)
CK MB CFR SERPL CALC: 58.7 NG/ML — HIGH (ref 0–3.8)
CK SERPL-CCNC: 269 U/L — HIGH (ref 25–170)
CK SERPL-CCNC: 492 U/L — HIGH (ref 25–170)
CO2 BLDMV-SCNC: 28 MMOL/L — SIGNIFICANT CHANGE UP (ref 21–29)
CO2 BLDMV-SCNC: 30 MMOL/L — HIGH (ref 21–29)
CO2 BLDMV-SCNC: 30 MMOL/L — HIGH (ref 21–29)
CO2 BLDMV-SCNC: 32 MMOL/L — HIGH (ref 21–29)
CO2 BLDV-SCNC: 20 MMOL/L — LOW (ref 22–26)
CO2 BLDV-SCNC: 28 MMOL/L — HIGH (ref 22–26)
CO2 BLDV-SCNC: 28 MMOL/L — HIGH (ref 22–26)
CO2 SERPL-SCNC: 20 MMOL/L — LOW (ref 22–31)
CREAT SERPL-MCNC: 0.69 MG/DL — SIGNIFICANT CHANGE UP (ref 0.5–1.3)
CULTURE RESULTS: SIGNIFICANT CHANGE UP
EOSINOPHIL # BLD AUTO: 0.06 K/UL — SIGNIFICANT CHANGE UP (ref 0–0.5)
EOSINOPHIL NFR BLD AUTO: 0.3 % — SIGNIFICANT CHANGE UP (ref 0–6)
FIBRINOGEN PPP-MCNC: 110 MG/DL — LOW (ref 290–520)
GAS PNL BLDA: SIGNIFICANT CHANGE UP
GAS PNL BLDMV: SIGNIFICANT CHANGE UP
GAS PNL BLDV: 131 MMOL/L — LOW (ref 136–145)
GAS PNL BLDV: 135 MMOL/L — LOW (ref 136–145)
GAS PNL BLDV: 136 MMOL/L — SIGNIFICANT CHANGE UP (ref 136–145)
GAS PNL BLDV: SIGNIFICANT CHANGE UP
GLUCOSE BLDC GLUCOMTR-MCNC: 102 MG/DL — HIGH (ref 70–99)
GLUCOSE BLDC GLUCOMTR-MCNC: 105 MG/DL — HIGH (ref 70–99)
GLUCOSE BLDC GLUCOMTR-MCNC: 118 MG/DL — HIGH (ref 70–99)
GLUCOSE BLDC GLUCOMTR-MCNC: 125 MG/DL — HIGH (ref 70–99)
GLUCOSE BLDC GLUCOMTR-MCNC: 126 MG/DL — HIGH (ref 70–99)
GLUCOSE BLDC GLUCOMTR-MCNC: 128 MG/DL — HIGH (ref 70–99)
GLUCOSE BLDC GLUCOMTR-MCNC: 131 MG/DL — HIGH (ref 70–99)
GLUCOSE BLDC GLUCOMTR-MCNC: 132 MG/DL — HIGH (ref 70–99)
GLUCOSE BLDC GLUCOMTR-MCNC: 134 MG/DL — HIGH (ref 70–99)
GLUCOSE BLDC GLUCOMTR-MCNC: 153 MG/DL — HIGH (ref 70–99)
GLUCOSE BLDV-MCNC: 100 MG/DL — HIGH (ref 70–99)
GLUCOSE BLDV-MCNC: 118 MG/DL — HIGH (ref 70–99)
GLUCOSE BLDV-MCNC: 129 MG/DL — HIGH (ref 70–99)
GLUCOSE SERPL-MCNC: 158 MG/DL — HIGH (ref 70–99)
HCO3 BLDMV-SCNC: 26 MMOL/L — SIGNIFICANT CHANGE UP (ref 20–28)
HCO3 BLDMV-SCNC: 28 MMOL/L — SIGNIFICANT CHANGE UP (ref 20–28)
HCO3 BLDMV-SCNC: 28 MMOL/L — SIGNIFICANT CHANGE UP (ref 20–28)
HCO3 BLDMV-SCNC: 30 MMOL/L — HIGH (ref 20–28)
HCO3 BLDV-SCNC: 19 MMOL/L — LOW (ref 22–29)
HCO3 BLDV-SCNC: 26 MMOL/L — SIGNIFICANT CHANGE UP (ref 22–29)
HCO3 BLDV-SCNC: 27 MMOL/L — SIGNIFICANT CHANGE UP (ref 22–29)
HCT VFR BLD CALC: 36.7 % — SIGNIFICANT CHANGE UP (ref 34.5–45)
HCT VFR BLDA CALC: 18 % — CRITICAL LOW (ref 34.5–46.5)
HCT VFR BLDA CALC: 20 % — CRITICAL LOW (ref 34.5–46.5)
HCT VFR BLDA CALC: 21 % — CRITICAL LOW (ref 34.5–46.5)
HGB BLD CALC-MCNC: 6 G/DL — CRITICAL LOW (ref 11.7–16.1)
HGB BLD CALC-MCNC: 6.5 G/DL — CRITICAL LOW (ref 11.7–16.1)
HGB BLD CALC-MCNC: 7.1 G/DL — LOW (ref 11.7–16.1)
HGB BLD-MCNC: 12.8 G/DL — SIGNIFICANT CHANGE UP (ref 11.5–15.5)
HOROWITZ INDEX BLDMV+IHG-RTO: 40 — SIGNIFICANT CHANGE UP
HOROWITZ INDEX BLDMV+IHG-RTO: 40 — SIGNIFICANT CHANGE UP
HOROWITZ INDEX BLDMV+IHG-RTO: 50 — SIGNIFICANT CHANGE UP
HOROWITZ INDEX BLDMV+IHG-RTO: 50 — SIGNIFICANT CHANGE UP
IMM GRANULOCYTES NFR BLD AUTO: 0.6 % — SIGNIFICANT CHANGE UP (ref 0–1.5)
INR BLD: 1.84 RATIO — HIGH (ref 0.88–1.16)
LACTATE BLDV-MCNC: 1.1 MMOL/L — SIGNIFICANT CHANGE UP (ref 0.7–2)
LACTATE BLDV-MCNC: 1.5 MMOL/L — SIGNIFICANT CHANGE UP (ref 0.7–2)
LACTATE BLDV-MCNC: 1.6 MMOL/L — SIGNIFICANT CHANGE UP (ref 0.7–2)
LYMPHOCYTES # BLD AUTO: 17.5 % — SIGNIFICANT CHANGE UP (ref 13–44)
LYMPHOCYTES # BLD AUTO: 3.34 K/UL — HIGH (ref 1–3.3)
MAGNESIUM SERPL-MCNC: 2.7 MG/DL — HIGH (ref 1.6–2.6)
MCHC RBC-ENTMCNC: 30.4 PG — SIGNIFICANT CHANGE UP (ref 27–34)
MCHC RBC-ENTMCNC: 34.9 GM/DL — SIGNIFICANT CHANGE UP (ref 32–36)
MCV RBC AUTO: 87.2 FL — SIGNIFICANT CHANGE UP (ref 80–100)
MONOCYTES # BLD AUTO: 0.43 K/UL — SIGNIFICANT CHANGE UP (ref 0–0.9)
MONOCYTES NFR BLD AUTO: 2.2 % — SIGNIFICANT CHANGE UP (ref 2–14)
NEPHROCHECK RESULT: 0.05 RISK SCORE — SIGNIFICANT CHANGE UP (ref 0–0.3)
NEUTROPHILS # BLD AUTO: 15.15 K/UL — HIGH (ref 1.8–7.4)
NEUTROPHILS NFR BLD AUTO: 79.2 % — HIGH (ref 43–77)
NRBC # BLD: 0 /100 WBCS — SIGNIFICANT CHANGE UP (ref 0–0)
O2 CT VFR BLD CALC: 29 MMHG — LOW (ref 30–65)
O2 CT VFR BLD CALC: 35 MMHG — SIGNIFICANT CHANGE UP (ref 30–65)
O2 CT VFR BLD CALC: 36 MMHG — SIGNIFICANT CHANGE UP (ref 30–65)
O2 CT VFR BLD CALC: 38 MMHG — SIGNIFICANT CHANGE UP (ref 30–65)
PCO2 BLDMV: 48 MMHG — SIGNIFICANT CHANGE UP (ref 30–65)
PCO2 BLDMV: 48 MMHG — SIGNIFICANT CHANGE UP (ref 30–65)
PCO2 BLDMV: 51 MMHG — SIGNIFICANT CHANGE UP (ref 30–65)
PCO2 BLDMV: 56 MMHG — SIGNIFICANT CHANGE UP (ref 30–65)
PCO2 BLDV: 36 MMHG — LOW (ref 39–42)
PCO2 BLDV: 39 MMHG — SIGNIFICANT CHANGE UP (ref 39–42)
PCO2 BLDV: 40 MMHG — SIGNIFICANT CHANGE UP (ref 39–42)
PH BLDMV: 7.32 — SIGNIFICANT CHANGE UP (ref 7.32–7.45)
PH BLDMV: 7.34 — SIGNIFICANT CHANGE UP (ref 7.32–7.45)
PH BLDMV: 7.38 — SIGNIFICANT CHANGE UP (ref 7.32–7.45)
PH BLDMV: 7.38 — SIGNIFICANT CHANGE UP (ref 7.32–7.45)
PH BLDV: 7.28 — LOW (ref 7.32–7.43)
PH BLDV: 7.44 — HIGH (ref 7.32–7.43)
PH BLDV: 7.49 — HIGH (ref 7.32–7.43)
PHOSPHATE SERPL-MCNC: 3.1 MG/DL — SIGNIFICANT CHANGE UP (ref 2.5–4.5)
PLATELET # BLD AUTO: 47 K/UL — LOW (ref 150–400)
PO2 BLDV: 100 MMHG — HIGH (ref 25–45)
PO2 BLDV: 106 MMHG — HIGH (ref 25–45)
PO2 BLDV: 86 MMHG — HIGH (ref 25–45)
POTASSIUM BLDV-SCNC: 3.8 MMOL/L — SIGNIFICANT CHANGE UP (ref 3.5–5.1)
POTASSIUM BLDV-SCNC: 4.4 MMOL/L — SIGNIFICANT CHANGE UP (ref 3.5–5.1)
POTASSIUM BLDV-SCNC: 5.2 MMOL/L — HIGH (ref 3.5–5.1)
POTASSIUM SERPL-MCNC: 4.2 MMOL/L — SIGNIFICANT CHANGE UP (ref 3.5–5.3)
POTASSIUM SERPL-SCNC: 4.2 MMOL/L — SIGNIFICANT CHANGE UP (ref 3.5–5.3)
PROT SERPL-MCNC: 3 G/DL — LOW (ref 6–8.3)
PROTHROM AB SERPL-ACNC: 21.5 SEC — HIGH (ref 10.6–13.6)
RBC # BLD: 4.21 M/UL — SIGNIFICANT CHANGE UP (ref 3.8–5.2)
RBC # FLD: 13.8 % — SIGNIFICANT CHANGE UP (ref 10.3–14.5)
SAO2 % BLDMV: 58.5 — LOW (ref 60–90)
SAO2 % BLDMV: 67.5 — SIGNIFICANT CHANGE UP (ref 60–90)
SAO2 % BLDMV: 69.4 — SIGNIFICANT CHANGE UP (ref 60–90)
SAO2 % BLDMV: 71.6 — SIGNIFICANT CHANGE UP (ref 60–90)
SAO2 % BLDV: 98 % — HIGH (ref 67–88)
SAO2 % BLDV: 98.3 % — HIGH (ref 67–88)
SAO2 % BLDV: 98.7 % — HIGH (ref 67–88)
SODIUM SERPL-SCNC: 139 MMOL/L — SIGNIFICANT CHANGE UP (ref 135–145)
SPECIMEN SOURCE: SIGNIFICANT CHANGE UP
TROPONIN T, HIGH SENSITIVITY RESULT: 1645 NG/L — HIGH (ref 0–51)
TROPONIN T, HIGH SENSITIVITY RESULT: 867 NG/L — HIGH (ref 0–51)
WBC # BLD: 19.13 K/UL — HIGH (ref 3.8–10.5)
WBC # FLD AUTO: 19.13 K/UL — HIGH (ref 3.8–10.5)

## 2021-10-01 PROCEDURE — 33508 ENDOSCOPIC VEIN HARVEST: CPT | Mod: 59

## 2021-10-01 PROCEDURE — 33518 CABG ARTERY-VEIN TWO: CPT

## 2021-10-01 PROCEDURE — 71045 X-RAY EXAM CHEST 1 VIEW: CPT | Mod: 26

## 2021-10-01 PROCEDURE — 99291 CRITICAL CARE FIRST HOUR: CPT

## 2021-10-01 PROCEDURE — 33518 CABG ARTERY-VEIN TWO: CPT | Mod: AS

## 2021-10-01 PROCEDURE — 33533 CABG ARTERIAL SINGLE: CPT | Mod: AS

## 2021-10-01 PROCEDURE — 33533 CABG ARTERIAL SINGLE: CPT

## 2021-10-01 PROCEDURE — 93010 ELECTROCARDIOGRAM REPORT: CPT

## 2021-10-01 RX ORDER — NICARDIPINE HYDROCHLORIDE 30 MG/1
5 CAPSULE, EXTENDED RELEASE ORAL
Qty: 40 | Refills: 0 | Status: DISCONTINUED | OUTPATIENT
Start: 2021-10-01 | End: 2021-10-02

## 2021-10-01 RX ORDER — OXYCODONE AND ACETAMINOPHEN 5; 325 MG/1; MG/1
2 TABLET ORAL EVERY 6 HOURS
Refills: 0 | Status: DISCONTINUED | OUTPATIENT
Start: 2021-10-01 | End: 2021-10-01

## 2021-10-01 RX ORDER — SODIUM CHLORIDE 9 MG/ML
750 INJECTION, SOLUTION INTRAVENOUS ONCE
Refills: 0 | Status: COMPLETED | OUTPATIENT
Start: 2021-10-01 | End: 2021-10-01

## 2021-10-01 RX ORDER — CEFUROXIME AXETIL 250 MG
1500 TABLET ORAL EVERY 8 HOURS
Refills: 0 | Status: COMPLETED | OUTPATIENT
Start: 2021-10-01 | End: 2021-10-03

## 2021-10-01 RX ORDER — ATORVASTATIN CALCIUM 80 MG/1
40 TABLET, FILM COATED ORAL AT BEDTIME
Refills: 0 | Status: DISCONTINUED | OUTPATIENT
Start: 2021-10-01 | End: 2021-10-06

## 2021-10-01 RX ORDER — OXYCODONE AND ACETAMINOPHEN 5; 325 MG/1; MG/1
1 TABLET ORAL EVERY 4 HOURS
Refills: 0 | Status: DISCONTINUED | OUTPATIENT
Start: 2021-10-01 | End: 2021-10-01

## 2021-10-01 RX ORDER — EPINEPHRINE 0.3 MG/.3ML
0.04 INJECTION INTRAMUSCULAR; SUBCUTANEOUS
Qty: 4 | Refills: 0 | Status: DISCONTINUED | OUTPATIENT
Start: 2021-10-01 | End: 2021-10-01

## 2021-10-01 RX ORDER — CHLORHEXIDINE GLUCONATE 213 G/1000ML
1 SOLUTION TOPICAL ONCE
Refills: 0 | Status: DISCONTINUED | OUTPATIENT
Start: 2021-10-01 | End: 2021-10-01

## 2021-10-01 RX ORDER — ACETAMINOPHEN 500 MG
500 TABLET ORAL ONCE
Refills: 0 | Status: COMPLETED | OUTPATIENT
Start: 2021-10-01 | End: 2021-10-01

## 2021-10-01 RX ORDER — INSULIN LISPRO 100/ML
7 VIAL (ML) SUBCUTANEOUS
Refills: 0 | Status: DISCONTINUED | OUTPATIENT
Start: 2021-10-01 | End: 2021-10-01

## 2021-10-01 RX ORDER — VASOPRESSIN 20 [USP'U]/ML
0.03 INJECTION INTRAVENOUS
Qty: 50 | Refills: 0 | Status: DISCONTINUED | OUTPATIENT
Start: 2021-10-01 | End: 2021-10-01

## 2021-10-01 RX ORDER — ALBUMIN HUMAN 25 %
250 VIAL (ML) INTRAVENOUS
Refills: 0 | Status: COMPLETED | OUTPATIENT
Start: 2021-10-01 | End: 2021-10-01

## 2021-10-01 RX ORDER — INSULIN GLARGINE 100 [IU]/ML
11 INJECTION, SOLUTION SUBCUTANEOUS AT BEDTIME
Refills: 0 | Status: DISCONTINUED | OUTPATIENT
Start: 2021-10-01 | End: 2021-10-01

## 2021-10-01 RX ORDER — NOREPINEPHRINE BITARTRATE/D5W 8 MG/250ML
0.1 PLASTIC BAG, INJECTION (ML) INTRAVENOUS
Qty: 8 | Refills: 0 | Status: DISCONTINUED | OUTPATIENT
Start: 2021-10-01 | End: 2021-10-01

## 2021-10-01 RX ORDER — CHLORHEXIDINE GLUCONATE 213 G/1000ML
1 SOLUTION TOPICAL ONCE
Refills: 0 | Status: COMPLETED | OUTPATIENT
Start: 2021-10-01 | End: 2021-10-01

## 2021-10-01 RX ORDER — HYDROMORPHONE HYDROCHLORIDE 2 MG/ML
0.5 INJECTION INTRAMUSCULAR; INTRAVENOUS; SUBCUTANEOUS ONCE
Refills: 0 | Status: DISCONTINUED | OUTPATIENT
Start: 2021-10-01 | End: 2021-10-01

## 2021-10-01 RX ADMIN — Medication 50 MILLIEQUIVALENT(S): at 22:00

## 2021-10-01 RX ADMIN — ATORVASTATIN CALCIUM 40 MILLIGRAM(S): 80 TABLET, FILM COATED ORAL at 23:30

## 2021-10-01 RX ADMIN — Medication 50 MILLIEQUIVALENT(S): at 22:30

## 2021-10-01 RX ADMIN — CHLORHEXIDINE GLUCONATE 15 MILLILITER(S): 213 SOLUTION TOPICAL at 06:11

## 2021-10-01 RX ADMIN — PANTOPRAZOLE SODIUM 40 MILLIGRAM(S): 20 TABLET, DELAYED RELEASE ORAL at 18:42

## 2021-10-01 RX ADMIN — Medication 125 MILLILITER(S): at 23:31

## 2021-10-01 RX ADMIN — Medication 125 MILLILITER(S): at 23:17

## 2021-10-01 RX ADMIN — Medication 1000 MILLIGRAM(S): at 06:11

## 2021-10-01 RX ADMIN — Medication 100 MILLIGRAM(S): at 16:08

## 2021-10-01 RX ADMIN — CARVEDILOL PHOSPHATE 25 MILLIGRAM(S): 80 CAPSULE, EXTENDED RELEASE ORAL at 05:07

## 2021-10-01 RX ADMIN — SODIUM CHLORIDE 750 MILLILITER(S): 9 INJECTION, SOLUTION INTRAVENOUS at 16:30

## 2021-10-01 RX ADMIN — HYDROMORPHONE HYDROCHLORIDE 0.5 MILLIGRAM(S): 2 INJECTION INTRAMUSCULAR; INTRAVENOUS; SUBCUTANEOUS at 19:14

## 2021-10-01 RX ADMIN — Medication 500 MILLIGRAM(S): at 18:45

## 2021-10-01 RX ADMIN — INSULIN HUMAN 3 UNIT(S)/HR: 100 INJECTION, SOLUTION SUBCUTANEOUS at 23:17

## 2021-10-01 RX ADMIN — CHLORHEXIDINE GLUCONATE 1 APPLICATION(S): 213 SOLUTION TOPICAL at 06:10

## 2021-10-01 RX ADMIN — Medication 12.5 MILLIGRAM(S): at 05:07

## 2021-10-01 RX ADMIN — Medication 50 MILLIEQUIVALENT(S): at 23:00

## 2021-10-01 RX ADMIN — GABAPENTIN 200 MILLIGRAM(S): 400 CAPSULE ORAL at 06:11

## 2021-10-01 RX ADMIN — HYDROMORPHONE HYDROCHLORIDE 0.5 MILLIGRAM(S): 2 INJECTION INTRAMUSCULAR; INTRAVENOUS; SUBCUTANEOUS at 18:54

## 2021-10-01 RX ADMIN — NICARDIPINE HYDROCHLORIDE 25 MG/HR: 30 CAPSULE, EXTENDED RELEASE ORAL at 23:17

## 2021-10-01 RX ADMIN — Medication 10 MILLIGRAM(S): at 05:07

## 2021-10-01 RX ADMIN — SODIUM CHLORIDE 3 MILLILITER(S): 9 INJECTION INTRAMUSCULAR; INTRAVENOUS; SUBCUTANEOUS at 05:06

## 2021-10-01 RX ADMIN — Medication 200 MILLIGRAM(S): at 18:15

## 2021-10-01 NOTE — PROGRESS NOTE ADULT - ASSESSMENT
71 yr old female with H/O DM2, HTN, multivessel CAD, moderate LV Systolic dysfunction EF 40%.    S/P CABG X3 POD #0  Post op multifactorial shock hypovolemia, cardiac dysfunction.  Acute blood loss anemia , coagulopathy , thrombocytopenia & hypofibrinogenemia requiring PRBC / blood products   DM2 Hyperglycemia     Plan   Post neurologically intact    Extubated on supplemental 02 / Analgesia / post extubation lung expansion maneuvers   F/u SPO2 / ABG / CXR     Invasive Hemodynamic monitoring  SR 90 CVP 6 PAP 28/ 16 CI 1.6 / SVO2 70 / Lactate 0.6 / Hgb 11  Post op requiring Vaso , Levo & Epi gtt  S/P 4 units PRBC / 2 FFP / 5 Cryo /   pressors & inotrope weaned off   Volume loading reassess hemodynamics   AV pacing back up    Lipitor   Repeat Hct / Coag / Platelets count ( Hold ASA platelet count 47 )  maintain all chest tubes / monitor outputs     F/u BUN/Cr, monitor outputs     DM2 / Hyperglycemia   INS gtt BG Q I hr     Protonix / Perop Abx     I have spent 30 minutes providing critical care management to this patient.

## 2021-10-01 NOTE — BRIEF OPERATIVE NOTE - NSICDXBRIEFPREOP_GEN_ALL_CORE_FT
PRE-OP DIAGNOSIS:  Non-ST elevation myocardial infarction (NSTEMI) 01-Oct-2021 13:12:18  Young Beck

## 2021-10-01 NOTE — BRIEF OPERATIVE NOTE - NSICDXBRIEFPOSTOP_GEN_ALL_CORE_FT
POST-OP DIAGNOSIS:  Non-ST elevation myocardial infarction (NSTEMI) 01-Oct-2021 13:12:31  Young Beck

## 2021-10-01 NOTE — PROGRESS NOTE ADULT - SUBJECTIVE AND OBJECTIVE BOX
Patient seen and examined at the bedside.      OBJECTIVE:  ICU Vital Signs Last 24 Hrs  T(C): 37 (01 Oct 2021 20:00), Max: 37 (01 Oct 2021 20:00)  T(F): 98.6 (01 Oct 2021 20:00), Max: 98.6 (01 Oct 2021 20:00)  HR: 93 (01 Oct 2021 22:45) (73 - 105)  BP: 148/83 (01 Oct 2021 06:56) (148/83 - 148/83)  BP(mean): --  ABP: 114/54 (01 Oct 2021 22:45) (105/49 - 154/73)  ABP(mean): 75 (01 Oct 2021 22:45) (68 - 102)  RR: 12 (01 Oct 2021 22:45) (10 - 25)  SpO2: 100% (01 Oct 2021 22:45) (100% - 100%)    Mode: vent off     @ 07:01  -  10-01 @ 07:00  --------------------------------------------------------  IN: 1368 mL / OUT: 1000 mL / NET: 368 mL    10-01 @ :01  -  10-01 @ 22:49  --------------------------------------------------------  IN: 1999.2 mL / OUT: 1773 mL / NET: 226.2 mL      CAPILLARY BLOOD GLUCOSE  102 (01 Oct 2021 21:00)      POCT Blood Glucose.: 125 mg/dL (01 Oct 2021 21:49)        Review of Systems    + incisional chest pain            PHYSICAL EXAM:Daily Height in cm: 152.4 (01 Oct 2021 06:56)    Daily Weight in k.9 (01 Oct 2021 06:05)  General: multiple lines, gtt & tubes   Neurology:  nonfocal, DRISCOLL x 4  Eyes: PERRLA/ EOMI,  ENT/Neck: Neck supple, trachea midline, No JVD  Respiratory: basilar rales sternal dressing M CT X2 L Pleural CT X1                                    CV:  SR S1/S2 no murmur   Abdominal: Soft, NT, ND +BS,   Extremities: No edema, + peripheral pulses  Skin: No Rashes, Hematoma, Ecchymosis    LINES:   [x ] Arterial Line   [ x] Central Line  [ x] PA catheter    [ x] pacemaker         ALL MEDICATIONS:  MEDICATIONS  (STANDING):  albumin human  5% IVPB 250 milliLiter(s) IV Intermittent every 30 minutes  atorvastatin 40 milliGRAM(s) Oral at bedtime  cefuroxime  IVPB 1500 milliGRAM(s) IV Intermittent every 8 hours  chlorhexidine 2% Cloths 1 Application(s) Topical <User Schedule>  dextrose 50% Injectable 50 milliLiter(s) IV Push every 15 minutes  insulin regular Infusion 3 Unit(s)/Hr (3 mL/Hr) IV Continuous <Continuous>  niCARdipine Infusion 5 mG/Hr (25 mL/Hr) IV Continuous <Continuous>  pantoprazole  Injectable 40 milliGRAM(s) IV Push daily  polyethylene glycol 3350 17 Gram(s) Oral daily  sodium chloride 0.9%. 1000 milliLiter(s) (10 mL/Hr) IV Continuous <Continuous>    MEDICATIONS  (PRN):      LABS:                        12.8   19.13 )-----------( 47       ( 01 Oct 2021 13:20 )             36.7     10-    139  |  105  |  22  ----------------------------<  158<H>  4.2   |  20<L>  |  0.69    Ca    7.4<L>      01 Oct 2021 13:20  Phos  3.1     10-  Mg     2.7     10-    TPro  3.0<L>  /  Alb  1.9<L>  /  TBili  0.5  /  DBili  x   /  AST  50<H>  /  ALT  25  /  AlkPhos  29<L>  10-    PT/INR - ( 01 Oct 2021 13:20 )   PT: 21.5 sec;   INR: 1.84 ratio         PTT - ( 01 Oct 2021 13:20 )  PTT:47.4 sec  Urinalysis Basic - ( 30 Sep 2021 09:05 )    Color: Light Yellow / Appearance: Clear / S.016 / pH: x  Gluc: x / Ketone: Negative  / Bili: Negative / Urobili: Negative   Blood: x / Protein: Negative / Nitrite: Negative   Leuk Esterase: Large / RBC: 1 /hpf / WBC 11 /HPF   Sq Epi: x / Non Sq Epi: 6 /hpf / Bacteria: Negative      Arterial Blood Gas:  10-01 @ 20:21  7.36/50/159/28/99.4/2.0  ABG lactate: --  Arterial Blood Gas:  10-01 @ 18:59  7.35/49/124/27/99.3/0.9  ABG lactate: --  Arterial Blood Gas:  10-01 @ 18:08  7.40/42/157/26/99.5/1.0  ABG lactate: --  Arterial Blood Gas:  10-01 @ 16:50  7.44/40/170/27/99.4/2.8  ABG lactate: --  Arterial Blood Gas:  10-01 @ 14:32  7.43/39/210/26/100.0/1.5  ABG lactate: --  Arterial Blood Gas:  10-01 @ 13:16  7.33/42/358/22/99.5/-3.7  ABG lactate: --  Arterial Blood Gas:  10-01 @ 12:46  7.35/39/342/22/100.0/-3.8  ABG lactate: --  Arterial Blood Gas:  10-01 @ 12:34  7.29/42/373/20/100.0/-6.0  ABG lactate: --  Arterial Blood Gas:  10-01 @ 12:09  7.40/33/333/20/99.9/-4.0  ABG lactate: --  Arterial Blood Gas:  10-01 @ 11:27  7.47/35/556/26/100.0/1.7  ABG lactate: --  Arterial Blood Gas:  10-01 @ 10:53  7.56/31/407/28/99.7/5.2  ABG lactate: --  Arterial Blood Gas:  10-01 @ 10:25  7.31/36/449/18/99.6/-7.5  ABG lactate: --  Arterial Blood Gas:  10-01 @ 10:16  7.42/34/424/22/99.8/-1.9  ABG lactate: --  Arterial Blood Gas:  10-01 @ 09:39  7.41/37/405/24/99.7/-1.0  ABG lactate: --  Arterial Blood Gas:  10-01 @ 09:11  7.43/37/396/25/99.6/0.4  ABG lactate: --  Arterial Blood Gas:  10-01 @ 08:02  7.47/35/393/26/99.8/1.9  ABG lactate: --    Venous Blood Gas:  10-01 @ 11:27  7.44/39/100/26/98.7  VBG Lactate: 1.5  Venous Blood Gas:  10-01 @ 10:53  7.49/36/106/27/98.3  VBG Lactate: 1.6  Venous Blood Gas:  10-01 @ 10:26  7.28/40/86/19/98.0  VBG Lactate: 1.1    CARDIAC MARKERS ( 01 Oct 2021 20:25 )  x     / x     / 492 U/L / x     / 58.7 ng/mL  CARDIAC MARKERS ( 01 Oct 2021 13:20 )  x     / x     / 269 U/L / x     / 42.1 ng/mL       LIVER FUNCTIONS - ( 01 Oct 2021 13:20 )  Alb: 1.9 g/dL / Pro: 3.0 g/dL / ALK PHOS: 29 U/L / ALT: 25 U/L / AST: 50 U/L / GGT: x           Urinalysis Basic - ( 30 Sep 2021 09:05 )    Color: Light Yellow / Appearance: Clear / S.016 / pH: x  Gluc: x / Ketone: Negative  / Bili: Negative / Urobili: Negative   Blood: x / Protein: Negative / Nitrite: Negative   Leuk Esterase: Large / RBC: 1 /hpf / WBC 11 /HPF   Sq Epi: x / Non Sq Epi: 6 /hpf / Bacteria: Negative

## 2021-10-01 NOTE — PROGRESS NOTE ADULT - ASSESSMENT
Assessment  DMT2: 71y Female with DM T2 with hyperglycemia, A1C 7.5%, was on oral meds at home, on basal bolus insulin preoperatively, increased dose yesterday, blood sugars improving, no hypoglycemic episodes, now POD0 s/p CABG, intubated..  CAD: s/p CABG, on medications, stable, monitored.  HTN: Controlled,  on antihypertensive medications.  HLD: On statin      Noam Ng MD  Cell: [phone]  Office: [phone]     Assessment  DMT2: 71y Female with DM T2 with hyperglycemia, A1C 7.5%, was on oral meds at home, on basal bolus insulin preoperatively, increased dose yesterday, blood sugars improving, no hypoglycemic episodes, now  POD0 s/p CABG, intubated..  CAD: s/p CABG, on medications, stable, monitored.  HTN: Controlled,  on antihypertensive medications.  HLD: On statin      Noam Ng MD  Cell: [phone]  Office: [phone]

## 2021-10-01 NOTE — BRIEF OPERATIVE NOTE - ADULT CT SURG CONDUIT HARVEST PERFORMED BY
ASHLIE Russ Endoscopic and open harvest of greater Saphenous vein Open harvest or left greater saphen

## 2021-10-01 NOTE — PROGRESS NOTE ADULT - SUBJECTIVE AND OBJECTIVE BOX
Chief complaint  Patient is a 71y old  Female who presents with a chief complaint of multivessel CAD (30 Sep 2021 16:42)   Review of systems  Patient for CABG today, no hypoglycemic episodes.    Labs and Fingersticks  CAPILLARY BLOOD GLUCOSE      POCT Blood Glucose.: 128 mg/dL (01 Oct 2021 14:04)  POCT Blood Glucose.: 167 mg/dL (30 Sep 2021 22:04)  POCT Blood Glucose.: 190 mg/dL (30 Sep 2021 16:44)      Medications  MEDICATIONS  (STANDING):  aspirin enteric coated 325 milliGRAM(s) Oral daily  aspirin Suppository 300 milliGRAM(s) Rectal once  cefuroxime  IVPB 1500 milliGRAM(s) IV Intermittent once  cefuroxime  IVPB 1500 milliGRAM(s) IV Intermittent every 8 hours  chlorhexidine 0.12% Liquid 15 milliLiter(s) Oral Mucosa every 12 hours  chlorhexidine 2% Cloths 1 Application(s) Topical <User Schedule>  dextrose 50% Injectable 50 milliLiter(s) IV Push every 15 minutes  dextrose 50% Injectable 25 milliLiter(s) IV Push every 15 minutes  EPINEPHrine    Infusion 0.04 MICROgram(s)/kG/Min (8.39 mL/Hr) IV Continuous <Continuous>  insulin regular Infusion 3 Unit(s)/Hr (3 mL/Hr) IV Continuous <Continuous>  norepinephrine Infusion 0.1 MICROgram(s)/kG/Min (10.5 mL/Hr) IV Continuous <Continuous>  pantoprazole  Injectable 40 milliGRAM(s) IV Push daily  polyethylene glycol 3350 17 Gram(s) Oral daily  sodium chloride 0.9%. 1000 milliLiter(s) (10 mL/Hr) IV Continuous <Continuous>  vasopressin Infusion 0.033 Unit(s)/Min (2 mL/Hr) IV Continuous <Continuous>      Physical Exam  Culture - Urine (collected 09-30-21 @ 14:56)  Source: Clean Catch Clean Catch (Midstream)  Final Report (10-01-21 @ 12:08):    <10,000 CFU/mL Normal Urogenital Emily        Vital Signs Last 12 Hrs  T(F): 98.1 (10-01-21 @ 04:50), Max: 98.1 (10-01-21 @ 04:50)  HR: 79 (10-01-21 @ 13:47) (73 - 82)  BP: 148/83 (10-01-21 @ 06:56) (148/83 - 148/83)  BP(mean): --  RR: 12 (10-01-21 @ 13:20) (12 - 18)  SpO2: 100% (10-01-21 @ 13:47) (100% - 100%)  Neck: No palpable thyroid nodules.             Chief complaint  Patient is a 71y old  Female who presents with a chief complaint of multivessel CAD (30 Sep 2021 16:42)   Review of systems  Patient for CABG today, no hypoglycemic episodes.    Labs and Fingersticks  CAPILLARY BLOOD GLUCOSE      POCT Blood Glucose.: 128 mg/dL (01 Oct 2021 14:04)  POCT Blood Glucose.: 167 mg/dL (30 Sep 2021 22:04)  POCT Blood Glucose.: 190 mg/dL (30 Sep 2021 16:44)      Medications  MEDICATIONS  (STANDING):  aspirin enteric coated 325 milliGRAM(s) Oral daily  aspirin Suppository 300 milliGRAM(s) Rectal once  cefuroxime  IVPB 1500 milliGRAM(s) IV Intermittent once  cefuroxime  IVPB 1500 milliGRAM(s) IV Intermittent every 8 hours  chlorhexidine 0.12% Liquid 15 milliLiter(s) Oral Mucosa every 12 hours  chlorhexidine 2% Cloths 1 Application(s) Topical <User Schedule>  dextrose 50% Injectable 50 milliLiter(s) IV Push every 15 minutes  dextrose 50% Injectable 25 milliLiter(s) IV Push every 15 minutes  EPINEPHrine    Infusion 0.04 MICROgram(s)/kG/Min (8.39 mL/Hr) IV Continuous <Continuous>  insulin regular Infusion 3 Unit(s)/Hr (3 mL/Hr) IV Continuous <Continuous>  norepinephrine Infusion 0.1 MICROgram(s)/kG/Min (10.5 mL/Hr) IV Continuous <Continuous>  pantoprazole  Injectable 40 milliGRAM(s) IV Push daily  polyethylene glycol 3350 17 Gram(s) Oral daily  sodium chloride 0.9%. 1000 milliLiter(s) (10 mL/Hr) IV Continuous <Continuous>  vasopressin Infusion 0.033 Unit(s)/Min (2 mL/Hr) IV Continuous <Continuous>      Physical Exam  Culture - Urine (collected 09-30-21 @ 14:56)  Source: Clean Catch Clean Catch (Midstream)  Final Report (10-01-21 @ 12:08):    <10,000 CFU/mL Normal Urogenital Emily        Vital Signs Last 12 Hrs  T(F): 98.1 (10-01-21 @ 04:50), Max: 98.1 (10-01-21 @ 04:50)  HR: 79 (10-01-21 @ 13:47) (73 - 82)  BP: 148/83 (10-01-21 @ 06:56) (148/83 - 148/83)  BP(mean): --  RR: 12 (10-01-21 @ 13:20) (12 - 18)  SpO2: 100% (10-01-21 @ 13:47) (100% - 100%)  Neck: No palpable thyroid nodules.

## 2021-10-01 NOTE — PRE-OP CHECKLIST - SELECT TESTS ORDERED
BMP/CBC/CMP/PT/PTT/INR/Hepatic Function/Type and Screen/Urinalysis/EKG/CXR/POCT Blood Glucose/COVID-19

## 2021-10-01 NOTE — PRE-ANESTHESIA EVALUATION ADULT - LAST ECHOCARDIOGRAM
9/28/2021 - EF 50-55%, Mitral annular calcification, normal RV diastolic opening, min MR, mild AS, min AR, RV normal size and function

## 2021-10-01 NOTE — PRE-ANESTHESIA EVALUATION ADULT - LAST CARDIAC ANGIOGRAM/IMAGING
09/27/2021 - LM no stenosis, oLAD 95% stenosis, CX OM1 95% stenosis, distal RCA 30% stenosis, distal RCA 30% stenosis, RPDA 90% stenosis

## 2021-10-01 NOTE — AIRWAY REMOVAL NOTE  ADULT & PEDS - ARTIFICAL AIRWAY REMOVAL COMMENTS
Written order for extubation verified. The patient was identified by full name and birth date compared to the identification band. Present during the procedure was MARIELLE Lowe

## 2021-10-01 NOTE — REASON FOR VISIT
Yes [Follow-up Visit ___] : a follow-up visit  for [unfilled] 11:00-11:40. chart reviewed. Pt received semi-gomez at B/S, alert, oriented X 1, able to follow one-step instructions and agreeable to PT evaluation. 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 VSS,

## 2021-10-01 NOTE — PRE-ANESTHESIA EVALUATION ADULT - NSANTHOSAYNRD_GEN_A_CORE
No. SANDOR screening performed.  STOP BANG Legend: 0-2 = LOW Risk; 3-4 = INTERMEDIATE Risk; 5-8 = HIGH Risk

## 2021-10-02 LAB
ALBUMIN SERPL ELPH-MCNC: 3.4 G/DL — SIGNIFICANT CHANGE UP (ref 3.3–5)
ALP SERPL-CCNC: 38 U/L — LOW (ref 40–120)
ALT FLD-CCNC: 37 U/L — SIGNIFICANT CHANGE UP (ref 10–45)
ANION GAP SERPL CALC-SCNC: 13 MMOL/L — SIGNIFICANT CHANGE UP (ref 5–17)
AST SERPL-CCNC: 71 U/L — HIGH (ref 10–40)
BASE EXCESS BLDMV CALC-SCNC: -1.4 MMOL/L — SIGNIFICANT CHANGE UP (ref -3–3)
BILIRUB SERPL-MCNC: 0.8 MG/DL — SIGNIFICANT CHANGE UP (ref 0.2–1.2)
BUN SERPL-MCNC: 22 MG/DL — SIGNIFICANT CHANGE UP (ref 7–23)
CALCIUM SERPL-MCNC: 8 MG/DL — LOW (ref 8.4–10.5)
CHLORIDE SERPL-SCNC: 105 MMOL/L — SIGNIFICANT CHANGE UP (ref 96–108)
CO2 BLDMV-SCNC: 28 MMOL/L — SIGNIFICANT CHANGE UP (ref 21–29)
CO2 SERPL-SCNC: 23 MMOL/L — SIGNIFICANT CHANGE UP (ref 22–31)
CREAT SERPL-MCNC: 0.83 MG/DL — SIGNIFICANT CHANGE UP (ref 0.5–1.3)
GAS PNL BLDA: SIGNIFICANT CHANGE UP
GAS PNL BLDA: SIGNIFICANT CHANGE UP
GAS PNL BLDMV: SIGNIFICANT CHANGE UP
GLUCOSE BLDC GLUCOMTR-MCNC: 111 MG/DL — HIGH (ref 70–99)
GLUCOSE BLDC GLUCOMTR-MCNC: 112 MG/DL — HIGH (ref 70–99)
GLUCOSE BLDC GLUCOMTR-MCNC: 113 MG/DL — HIGH (ref 70–99)
GLUCOSE BLDC GLUCOMTR-MCNC: 116 MG/DL — HIGH (ref 70–99)
GLUCOSE BLDC GLUCOMTR-MCNC: 123 MG/DL — HIGH (ref 70–99)
GLUCOSE BLDC GLUCOMTR-MCNC: 125 MG/DL — HIGH (ref 70–99)
GLUCOSE BLDC GLUCOMTR-MCNC: 126 MG/DL — HIGH (ref 70–99)
GLUCOSE BLDC GLUCOMTR-MCNC: 126 MG/DL — HIGH (ref 70–99)
GLUCOSE BLDC GLUCOMTR-MCNC: 129 MG/DL — HIGH (ref 70–99)
GLUCOSE BLDC GLUCOMTR-MCNC: 140 MG/DL — HIGH (ref 70–99)
GLUCOSE BLDC GLUCOMTR-MCNC: 142 MG/DL — HIGH (ref 70–99)
GLUCOSE BLDC GLUCOMTR-MCNC: 142 MG/DL — HIGH (ref 70–99)
GLUCOSE BLDC GLUCOMTR-MCNC: 153 MG/DL — HIGH (ref 70–99)
GLUCOSE BLDC GLUCOMTR-MCNC: 203 MG/DL — HIGH (ref 70–99)
GLUCOSE BLDC GLUCOMTR-MCNC: 82 MG/DL — SIGNIFICANT CHANGE UP (ref 70–99)
GLUCOSE SERPL-MCNC: 136 MG/DL — HIGH (ref 70–99)
HCO3 BLDMV-SCNC: 26 MMOL/L — SIGNIFICANT CHANGE UP (ref 20–28)
HCT VFR BLD CALC: 26.3 % — LOW (ref 34.5–45)
HGB BLD-MCNC: 9.2 G/DL — LOW (ref 11.5–15.5)
HOROWITZ INDEX BLDMV+IHG-RTO: 44 — SIGNIFICANT CHANGE UP
MAGNESIUM SERPL-MCNC: 1.8 MG/DL — SIGNIFICANT CHANGE UP (ref 1.6–2.6)
MCHC RBC-ENTMCNC: 30.4 PG — SIGNIFICANT CHANGE UP (ref 27–34)
MCHC RBC-ENTMCNC: 35 GM/DL — SIGNIFICANT CHANGE UP (ref 32–36)
MCV RBC AUTO: 86.8 FL — SIGNIFICANT CHANGE UP (ref 80–100)
NEPHROCHECK RESULT: 0.05 RISK SCORE — SIGNIFICANT CHANGE UP (ref 0–0.3)
NRBC # BLD: 0 /100 WBCS — SIGNIFICANT CHANGE UP (ref 0–0)
O2 CT VFR BLD CALC: 45 MMHG — SIGNIFICANT CHANGE UP (ref 30–65)
PCO2 BLDMV: 54 MMHG — SIGNIFICANT CHANGE UP (ref 30–65)
PH BLDMV: 7.29 — LOW (ref 7.32–7.45)
PHOSPHATE SERPL-MCNC: 4.6 MG/DL — HIGH (ref 2.5–4.5)
PLATELET # BLD AUTO: 65 K/UL — LOW (ref 150–400)
POTASSIUM SERPL-MCNC: 5 MMOL/L — SIGNIFICANT CHANGE UP (ref 3.5–5.3)
POTASSIUM SERPL-SCNC: 5 MMOL/L — SIGNIFICANT CHANGE UP (ref 3.5–5.3)
PROT SERPL-MCNC: 4.7 G/DL — LOW (ref 6–8.3)
RBC # BLD: 3.03 M/UL — LOW (ref 3.8–5.2)
RBC # FLD: 14.6 % — HIGH (ref 10.3–14.5)
SAO2 % BLDMV: 79.3 — SIGNIFICANT CHANGE UP (ref 60–90)
SODIUM SERPL-SCNC: 141 MMOL/L — SIGNIFICANT CHANGE UP (ref 135–145)
WBC # BLD: 13.71 K/UL — HIGH (ref 3.8–10.5)
WBC # FLD AUTO: 13.71 K/UL — HIGH (ref 3.8–10.5)

## 2021-10-02 PROCEDURE — 99233 SBSQ HOSP IP/OBS HIGH 50: CPT

## 2021-10-02 PROCEDURE — 93010 ELECTROCARDIOGRAM REPORT: CPT

## 2021-10-02 PROCEDURE — 71045 X-RAY EXAM CHEST 1 VIEW: CPT | Mod: 26

## 2021-10-02 RX ORDER — SODIUM CHLORIDE 9 MG/ML
1000 INJECTION, SOLUTION INTRAVENOUS
Refills: 0 | Status: DISCONTINUED | OUTPATIENT
Start: 2021-10-02 | End: 2021-10-03

## 2021-10-02 RX ORDER — METOPROLOL TARTRATE 50 MG
5 TABLET ORAL ONCE
Refills: 0 | Status: COMPLETED | OUTPATIENT
Start: 2021-10-02 | End: 2021-10-02

## 2021-10-02 RX ORDER — MAGNESIUM SULFATE 500 MG/ML
1 VIAL (ML) INJECTION ONCE
Refills: 0 | Status: DISCONTINUED | OUTPATIENT
Start: 2021-10-02 | End: 2021-10-02

## 2021-10-02 RX ORDER — METOPROLOL TARTRATE 50 MG
25 TABLET ORAL
Refills: 0 | Status: DISCONTINUED | OUTPATIENT
Start: 2021-10-02 | End: 2021-10-02

## 2021-10-02 RX ORDER — ASPIRIN/CALCIUM CARB/MAGNESIUM 324 MG
325 TABLET ORAL DAILY
Refills: 0 | Status: DISCONTINUED | OUTPATIENT
Start: 2021-10-02 | End: 2021-10-02

## 2021-10-02 RX ORDER — PANTOPRAZOLE SODIUM 20 MG/1
40 TABLET, DELAYED RELEASE ORAL
Refills: 0 | Status: DISCONTINUED | OUTPATIENT
Start: 2021-10-02 | End: 2021-10-06

## 2021-10-02 RX ORDER — ACETAMINOPHEN 500 MG
650 TABLET ORAL EVERY 6 HOURS
Refills: 0 | Status: DISCONTINUED | OUTPATIENT
Start: 2021-10-02 | End: 2021-10-06

## 2021-10-02 RX ORDER — TRAMADOL HYDROCHLORIDE 50 MG/1
25 TABLET ORAL ONCE
Refills: 0 | Status: DISCONTINUED | OUTPATIENT
Start: 2021-10-02 | End: 2021-10-02

## 2021-10-02 RX ORDER — CALCIUM GLUCONATE 100 MG/ML
1 VIAL (ML) INTRAVENOUS ONCE
Refills: 0 | Status: COMPLETED | OUTPATIENT
Start: 2021-10-02 | End: 2021-10-02

## 2021-10-02 RX ORDER — FUROSEMIDE 40 MG
10 TABLET ORAL ONCE
Refills: 0 | Status: COMPLETED | OUTPATIENT
Start: 2021-10-02 | End: 2021-10-02

## 2021-10-02 RX ORDER — ASPIRIN/CALCIUM CARB/MAGNESIUM 324 MG
81 TABLET ORAL DAILY
Refills: 0 | Status: DISCONTINUED | OUTPATIENT
Start: 2021-10-02 | End: 2021-10-06

## 2021-10-02 RX ORDER — DEXTROSE 50 % IN WATER 50 %
25 SYRINGE (ML) INTRAVENOUS ONCE
Refills: 0 | Status: DISCONTINUED | OUTPATIENT
Start: 2021-10-02 | End: 2021-10-03

## 2021-10-02 RX ORDER — METOPROLOL TARTRATE 50 MG
25 TABLET ORAL EVERY 8 HOURS
Refills: 0 | Status: DISCONTINUED | OUTPATIENT
Start: 2021-10-02 | End: 2021-10-03

## 2021-10-02 RX ORDER — LOSARTAN POTASSIUM 100 MG/1
25 TABLET, FILM COATED ORAL DAILY
Refills: 0 | Status: DISCONTINUED | OUTPATIENT
Start: 2021-10-02 | End: 2021-10-04

## 2021-10-02 RX ORDER — INSULIN LISPRO 100/ML
VIAL (ML) SUBCUTANEOUS
Refills: 0 | Status: DISCONTINUED | OUTPATIENT
Start: 2021-10-02 | End: 2021-10-06

## 2021-10-02 RX ORDER — MAGNESIUM SULFATE 500 MG/ML
1 VIAL (ML) INJECTION ONCE
Refills: 0 | Status: COMPLETED | OUTPATIENT
Start: 2021-10-02 | End: 2021-10-02

## 2021-10-02 RX ORDER — INSULIN GLARGINE 100 [IU]/ML
10 INJECTION, SOLUTION SUBCUTANEOUS AT BEDTIME
Refills: 0 | Status: DISCONTINUED | OUTPATIENT
Start: 2021-10-02 | End: 2021-10-03

## 2021-10-02 RX ORDER — INSULIN LISPRO 100/ML
VIAL (ML) SUBCUTANEOUS AT BEDTIME
Refills: 0 | Status: DISCONTINUED | OUTPATIENT
Start: 2021-10-02 | End: 2021-10-06

## 2021-10-02 RX ORDER — DEXTROSE 50 % IN WATER 50 %
12.5 SYRINGE (ML) INTRAVENOUS ONCE
Refills: 0 | Status: DISCONTINUED | OUTPATIENT
Start: 2021-10-02 | End: 2021-10-03

## 2021-10-02 RX ORDER — GLUCAGON INJECTION, SOLUTION 0.5 MG/.1ML
1 INJECTION, SOLUTION SUBCUTANEOUS ONCE
Refills: 0 | Status: DISCONTINUED | OUTPATIENT
Start: 2021-10-02 | End: 2021-10-03

## 2021-10-02 RX ORDER — POTASSIUM CHLORIDE 20 MEQ
10 PACKET (EA) ORAL
Refills: 0 | Status: COMPLETED | OUTPATIENT
Start: 2021-10-02 | End: 2021-10-01

## 2021-10-02 RX ORDER — DEXTROSE 50 % IN WATER 50 %
15 SYRINGE (ML) INTRAVENOUS ONCE
Refills: 0 | Status: DISCONTINUED | OUTPATIENT
Start: 2021-10-02 | End: 2021-10-03

## 2021-10-02 RX ADMIN — Medication 100 MILLIGRAM(S): at 00:37

## 2021-10-02 RX ADMIN — CHLORHEXIDINE GLUCONATE 1 APPLICATION(S): 213 SOLUTION TOPICAL at 05:09

## 2021-10-02 RX ADMIN — LOSARTAN POTASSIUM 25 MILLIGRAM(S): 100 TABLET, FILM COATED ORAL at 11:59

## 2021-10-02 RX ADMIN — Medication 100 MILLIGRAM(S): at 08:11

## 2021-10-02 RX ADMIN — INSULIN HUMAN 3 UNIT(S)/HR: 100 INJECTION, SOLUTION SUBCUTANEOUS at 13:34

## 2021-10-02 RX ADMIN — Medication 650 MILLIGRAM(S): at 15:20

## 2021-10-02 RX ADMIN — Medication 100 MILLIGRAM(S): at 16:46

## 2021-10-02 RX ADMIN — TRAMADOL HYDROCHLORIDE 25 MILLIGRAM(S): 50 TABLET ORAL at 18:25

## 2021-10-02 RX ADMIN — Medication 25 MILLIGRAM(S): at 06:54

## 2021-10-02 RX ADMIN — PANTOPRAZOLE SODIUM 40 MILLIGRAM(S): 20 TABLET, DELAYED RELEASE ORAL at 11:55

## 2021-10-02 RX ADMIN — Medication 25 MILLIGRAM(S): at 22:15

## 2021-10-02 RX ADMIN — Medication 100 GRAM(S): at 04:22

## 2021-10-02 RX ADMIN — INSULIN GLARGINE 10 UNIT(S): 100 INJECTION, SOLUTION SUBCUTANEOUS at 22:25

## 2021-10-02 RX ADMIN — Medication 100 GRAM(S): at 03:36

## 2021-10-02 RX ADMIN — Medication 5 MILLIGRAM(S): at 11:55

## 2021-10-02 RX ADMIN — Medication 25 MILLIGRAM(S): at 13:35

## 2021-10-02 RX ADMIN — TRAMADOL HYDROCHLORIDE 25 MILLIGRAM(S): 50 TABLET ORAL at 19:00

## 2021-10-02 RX ADMIN — Medication 650 MILLIGRAM(S): at 16:00

## 2021-10-02 RX ADMIN — Medication 10 MILLIGRAM(S): at 13:35

## 2021-10-02 RX ADMIN — ATORVASTATIN CALCIUM 40 MILLIGRAM(S): 80 TABLET, FILM COATED ORAL at 22:15

## 2021-10-02 RX ADMIN — Medication 81 MILLIGRAM(S): at 11:55

## 2021-10-02 NOTE — PHYSICAL THERAPY INITIAL EVALUATION ADULT - IMPAIRMENTS FOUND, PT EVAL
aerobic capacity/endurance/arousal, attention, and cognition/gait, locomotion, and balance/muscle strength/poor safety awareness/posture

## 2021-10-02 NOTE — PHYSICAL THERAPY INITIAL EVALUATION ADULT - ADDITIONAL COMMENTS
Pt lives with  in PH, unclear of # of steps to enter due to pt unable to verbalize/follow commands. Previously (I) with ADLs and ambulated without an assistive device. Social hx to be confirmed.

## 2021-10-02 NOTE — PROGRESS NOTE ADULT - SUBJECTIVE AND OBJECTIVE BOX
Chief complaint    Patient is a 71y old  Female who presents with a chief complaint of multivessel CAD (02 Oct 2021 06:57)   Review of systems  Patient in bed, appears comfortable.    Labs and Fingersticks  CAPILLARY BLOOD GLUCOSE  116 (02 Oct 2021 15:00)  82 (02 Oct 2021 14:00)  111 (02 Oct 2021 13:00)  112 (02 Oct 2021 12:00)  111 (02 Oct 2021 11:00)  113 (02 Oct 2021 10:00)  126 (02 Oct 2021 09:00)  126 (02 Oct 2021 08:00)  123 (02 Oct 2021 07:00)  118 (02 Oct 2021 06:00)  129 (02 Oct 2021 04:00)  140 (02 Oct 2021 03:00)  153 (02 Oct 2021 02:00)  142 (02 Oct 2021 01:00)  135 (02 Oct 2021 00:00)  102 (01 Oct 2021 21:00)  118 (01 Oct 2021 20:00)      POCT Blood Glucose.: 116 mg/dL (02 Oct 2021 14:51)  POCT Blood Glucose.: 82 mg/dL (02 Oct 2021 14:13)  POCT Blood Glucose.: 111 mg/dL (02 Oct 2021 12:57)  POCT Blood Glucose.: 112 mg/dL (02 Oct 2021 11:52)  POCT Blood Glucose.: 111 mg/dL (02 Oct 2021 10:48)  POCT Blood Glucose.: 113 mg/dL (02 Oct 2021 09:59)  POCT Blood Glucose.: 126 mg/dL (02 Oct 2021 09:04)  POCT Blood Glucose.: 126 mg/dL (02 Oct 2021 07:45)  POCT Blood Glucose.: 123 mg/dL (02 Oct 2021 06:49)  POCT Blood Glucose.: 111 mg/dL (02 Oct 2021 05:58)  POCT Blood Glucose.: 125 mg/dL (02 Oct 2021 05:04)  POCT Blood Glucose.: 129 mg/dL (02 Oct 2021 03:48)  POCT Blood Glucose.: 140 mg/dL (02 Oct 2021 02:53)  POCT Blood Glucose.: 153 mg/dL (02 Oct 2021 01:55)  POCT Blood Glucose.: 142 mg/dL (02 Oct 2021 00:49)  POCT Blood Glucose.: 131 mg/dL (01 Oct 2021 23:53)  POCT Blood Glucose.: 134 mg/dL (01 Oct 2021 22:57)  POCT Blood Glucose.: 125 mg/dL (01 Oct 2021 21:49)  POCT Blood Glucose.: 102 mg/dL (01 Oct 2021 20:46)  POCT Blood Glucose.: 118 mg/dL (01 Oct 2021 19:51)  POCT Blood Glucose.: 132 mg/dL (01 Oct 2021 18:57)  POCT Blood Glucose.: 153 mg/dL (01 Oct 2021 18:01)  POCT Blood Glucose.: 126 mg/dL (01 Oct 2021 16:05)      Anion Gap, Serum: 13 (10-02 @ 00:20)  Anion Gap, Serum: 14 (10-01 @ 13:20)      Calcium, Total Serum: 8.0 *L* (10-02 @ 00:20)  Calcium, Total Serum: 7.4 *L* (10-01 @ 13:20)  Albumin, Serum: 3.4 (10-02 @ 00:20)  Albumin, Serum: 1.9 *L* (10-01 @ 13:20)    Alanine Aminotransferase (ALT/SGPT): 37 (10-02 @ 00:20)  Alanine Aminotransferase (ALT/SGPT): 25 (10-01 @ 13:20)  Alkaline Phosphatase, Serum: 38 *L* (10-02 @ 00:20)  Alkaline Phosphatase, Serum: 29 *L* (10-01 @ 13:20)  Aspartate Aminotransferase (AST/SGOT): 71 *H* (10-02 @ 00:20)  Aspartate Aminotransferase (AST/SGOT): 50 *H* (10-01 @ 13:20)        10-02    141  |  105  |  22  ----------------------------<  136<H>  5.0   |  23  |  0.83    Ca    8.0<L>      02 Oct 2021 00:20  Phos  4.6     10-02  Mg     1.8     10-02    TPro  4.7<L>  /  Alb  3.4  /  TBili  0.8  /  DBili  x   /  AST  71<H>  /  ALT  37  /  AlkPhos  38<L>  10-02                        9.2    13.71 )-----------( 65       ( 02 Oct 2021 00:19 )             26.3     Medications  MEDICATIONS  (STANDING):  aspirin enteric coated 81 milliGRAM(s) Oral daily  atorvastatin 40 milliGRAM(s) Oral at bedtime  cefuroxime  IVPB 1500 milliGRAM(s) IV Intermittent every 8 hours  chlorhexidine 2% Cloths 1 Application(s) Topical <User Schedule>  dextrose 50% Injectable 50 milliLiter(s) IV Push every 15 minutes  insulin regular Infusion 3 Unit(s)/Hr (3 mL/Hr) IV Continuous <Continuous>  losartan 25 milliGRAM(s) Oral daily  metoprolol tartrate 25 milliGRAM(s) Oral every 8 hours  pantoprazole    Tablet 40 milliGRAM(s) Oral before breakfast  polyethylene glycol 3350 17 Gram(s) Oral daily  sodium chloride 0.9%. 1000 milliLiter(s) (10 mL/Hr) IV Continuous <Continuous>      Physical Exam  General: Patient comfortable in bed  Vital Signs Last 12 Hrs  T(F): 97 (10-02-21 @ 12:00), Max: 99.7 (10-02-21 @ 08:00)  HR: 90 (10-02-21 @ 14:00) (78 - 100)  BP: --  BP(mean): --  RR: 20 (10-02-21 @ 14:00) (8 - 23)  SpO2: 100% (10-02-21 @ 14:00) (98% - 100%)  Neck: No palpable thyroid nodules.  CVS: S1S2, No murmurs  Respiratory: No wheezing, no crepitations  GI: Abdomen soft, bowel sounds positive  Musculoskeletal:  edema lower extremities.     Diagnostics

## 2021-10-02 NOTE — PROGRESS NOTE ADULT - PROBLEM SELECTOR PLAN 1
Tight glycemic control necessary. Patient will be on IV insulin postoperatively.   Will start Lantus 20 units at bed time.  Will start Admelog 7 units before each meal in addition to Admelog correction scale coverage.  Patient counseled for compliance with consistent low carb diet.

## 2021-10-02 NOTE — PROGRESS NOTE ADULT - ASSESSMENT
Assessment  DMT2: 71y Female with DM T2 with hyperglycemia, A1C 7.5%, was on oral meds at home, on IV insulin postoperatively, blood sugars improving, no hypoglycemic episodes, now  POD0 s/p CABG, eating meals.  CAD: s/p CABG, on medications, stable, monitored.  HTN: Controlled,  on antihypertensive medications.  HLD: On statin      Noam Ng MD  Cell: [phone]  Office: [phone]

## 2021-10-02 NOTE — PROGRESS NOTE ADULT - SUBJECTIVE AND OBJECTIVE BOX
CRITICAL CARE ATTENDING - CTICU    MEDICATIONS  (STANDING):  aspirin enteric coated 81 milliGRAM(s) Oral daily  atorvastatin 40 milliGRAM(s) Oral at bedtime  cefuroxime  IVPB 1500 milliGRAM(s) IV Intermittent every 8 hours  chlorhexidine 2% Cloths 1 Application(s) Topical <User Schedule>  dextrose 50% Injectable 50 milliLiter(s) IV Push every 15 minutes  insulin regular Infusion 3 Unit(s)/Hr (3 mL/Hr) IV Continuous <Continuous>  metoprolol tartrate 25 milliGRAM(s) Oral two times a day  pantoprazole  Injectable 40 milliGRAM(s) IV Push daily  polyethylene glycol 3350 17 Gram(s) Oral daily  sodium chloride 0.9%. 1000 milliLiter(s) (10 mL/Hr) IV Continuous <Continuous>                                    9.2    13.71 )-----------( 65       ( 02 Oct 2021 00:19 )             26.3       10-    141  |  105  |  22  ----------------------------<  136<H>  5.0   |  23  |  0.83    Ca    8.0<L>      02 Oct 2021 00:20  Phos  4.6     10-  Mg     1.8     10-02    TPro  4.7<L>  /  Alb  3.4  /  TBili  0.8  /  DBili  x   /  AST  71<H>  /  ALT  37  /  AlkPhos  38<L>  1002      PT/INR - ( 01 Oct 2021 13:20 )   PT: 21.5 sec;   INR: 1.84 ratio         PTT - ( 01 Oct 2021 13:20 )  PTT:47.4 sec    Mode: vent off      Daily     Daily Weight in k.8 (02 Oct 2021 00:00)       @ :  -  10-01 @ 07:00  --------------------------------------------------------  IN: 1368 mL / OUT: 1000 mL / NET: 368 mL    10-01 @ 07:01  -  10-02 @ 06:57  --------------------------------------------------------  IN: 2726.2 mL / OUT: 2893 mL / NET: -166.8 mL        Critically Ill patient  : [ ] preoperative ,   [x] post operative    Requires :  [x ] Arterial Line   [x ] Central Line  [ ] PA catheter  [ ] IABP  [ ] ECMO  [ ] LVAD  [ ] Ventilator  [ ] pacemaker [ ] Impella.                      [x ] ABG's     [ x] Pulse Oxymetry Monitoring  Bedside evaluation , monitoring , treatment of hemodynamics , fluids , IVP/ IVCD meds.        Diagnosis:     POD 1 - CABG     Hypertension     diabetes mellitus type 2     Hypovolemia    Thrombocytopenia     Requires chest PT, pulmonary toilet, suctioning to maintain SaO2,  patent airway and treat atelectasis.      Requires bedside physical therapy, mobilization and total halfway care.               By signing my name below, I, Annabel Sigala, attest that this documentation has been prepared under the direction and in the presence of Josué Frazier MD.   Electronically Signed: Ian Oliveira 10-02-21 @ 06:57      Discussed with CT surgeon, Physician Assistant - Nurse Practitioner- Critical care medicine team.   Discussed at  AM / PM rounds.   Chart, labs , films reviewed.    Cumulative Critical Care Time Given Today:  CRITICAL CARE ATTENDING - CTICU    MEDICATIONS  (STANDING):  aspirin enteric coated 81 milliGRAM(s) Oral daily  atorvastatin 40 milliGRAM(s) Oral at bedtime  cefuroxime  IVPB 1500 milliGRAM(s) IV Intermittent every 8 hours  chlorhexidine 2% Cloths 1 Application(s) Topical <User Schedule>  dextrose 50% Injectable 50 milliLiter(s) IV Push every 15 minutes  insulin regular Infusion 3 Unit(s)/Hr (3 mL/Hr) IV Continuous <Continuous>  metoprolol tartrate 25 milliGRAM(s) Oral two times a day  pantoprazole  Injectable 40 milliGRAM(s) IV Push daily  polyethylene glycol 3350 17 Gram(s) Oral daily  sodium chloride 0.9%. 1000 milliLiter(s) (10 mL/Hr) IV Continuous <Continuous>                                    9.2    13.71 )-----------( 65       ( 02 Oct 2021 00:19 )             26.3       10-    141  |  105  |  22  ----------------------------<  136<H>  5.0   |  23  |  0.83    Ca    8.0<L>      02 Oct 2021 00:20  Phos  4.6     10-  Mg     1.8     10-02    TPro  4.7<L>  /  Alb  3.4  /  TBili  0.8  /  DBili  x   /  AST  71<H>  /  ALT  37  /  AlkPhos  38<L>  1002      PT/INR - ( 01 Oct 2021 13:20 )   PT: 21.5 sec;   INR: 1.84 ratio         PTT - ( 01 Oct 2021 13:20 )  PTT:47.4 sec    Mode: vent off      Daily     Daily Weight in k.8 (02 Oct 2021 00:00)       @ :  -  10-01 @ 07:00  --------------------------------------------------------  IN: 1368 mL / OUT: 1000 mL / NET: 368 mL    10-01 @ 07:01  -  10-02 @ 06:57  --------------------------------------------------------  IN: 2726.2 mL / OUT: 2893 mL / NET: -166.8 mL        Critically Ill patient  : [ ] preoperative ,   [x] post operative    Requires :  [x ] Arterial Line   [x ] Central Line  [ ] PA catheter  [ ] IABP  [ ] ECMO  [ ] LVAD  [ ] Ventilator  [x ] pacemaker - TPM  [ ] Impella.                      [x ] ABG's     [ x] Pulse Oxymetry Monitoring  Bedside evaluation , monitoring , treatment of hemodynamics , fluids , IVP/ IVCD meds.        Diagnosis:     Admitted NSTEMI    POD 1 - CABG X 3 L    Hypertension     diabetes mellitus type 2 - IVCD Insukin    Hypovolemia    Thrombocytopenia     Requires chest PT, pulmonary toilet, suctioning to maintain SaO2,  patent airway and treat atelectasis.      Requires bedside physical therapy, mobilization and total snf care.     Chest Tube Drainage     ECG     Temporary pacemaker (TPM) interrogation and setting.     CHF- acute [x]   chronic [x ]    systolic x ]   diatolic [ ]          - Echo- EF -   40's          [ ] RV dysfunction          - Cxr-cardiomegally, edema          - Clinical-  [ ]inotropes   [ ]pressors   [ x]diuresis   [ ]IABP   [ ]ECMO   [ ]LVAD   [ ]Respiratory Failure                    -                   By signing my name below, I, Annabel Sigala, attest that this documentation has been prepared under the direction and in the presence of Josué Frazier MD.   Electronically Signed: Ian Oliveira 10-02-21 @ 06:57    I, Josué Frazier, personally performed the services described in this documentation. All medical record entries made by the scribe were at my direction and in my presence. I have reviewed the chart and agree that the record reflects my personal performance and is accurate and complete.   Josué Frazier MD.       Discussed with CT surgeon, Physician Assistant - Nurse Practitioner- Critical care medicine team.   Discussed at  AM / PM rounds.   Chart, labs , films reviewed.    Cumulative Critical Care Time Given Today:  20 min

## 2021-10-03 LAB
ALBUMIN SERPL ELPH-MCNC: 3.1 G/DL — LOW (ref 3.3–5)
ALP SERPL-CCNC: 45 U/L — SIGNIFICANT CHANGE UP (ref 40–120)
ALT FLD-CCNC: 30 U/L — SIGNIFICANT CHANGE UP (ref 10–45)
ANION GAP SERPL CALC-SCNC: 11 MMOL/L — SIGNIFICANT CHANGE UP (ref 5–17)
AST SERPL-CCNC: 42 U/L — HIGH (ref 10–40)
BASOPHILS # BLD AUTO: 0.02 K/UL — SIGNIFICANT CHANGE UP (ref 0–0.2)
BASOPHILS NFR BLD AUTO: 0.1 % — SIGNIFICANT CHANGE UP (ref 0–2)
BILIRUB SERPL-MCNC: 0.9 MG/DL — SIGNIFICANT CHANGE UP (ref 0.2–1.2)
BLD GP AB SCN SERPL QL: NEGATIVE — SIGNIFICANT CHANGE UP
BUN SERPL-MCNC: 23 MG/DL — SIGNIFICANT CHANGE UP (ref 7–23)
CALCIUM SERPL-MCNC: 8.2 MG/DL — LOW (ref 8.4–10.5)
CHLORIDE SERPL-SCNC: 100 MMOL/L — SIGNIFICANT CHANGE UP (ref 96–108)
CO2 SERPL-SCNC: 23 MMOL/L — SIGNIFICANT CHANGE UP (ref 22–31)
CREAT SERPL-MCNC: 1.01 MG/DL — SIGNIFICANT CHANGE UP (ref 0.5–1.3)
EOSINOPHIL # BLD AUTO: 0.13 K/UL — SIGNIFICANT CHANGE UP (ref 0–0.5)
EOSINOPHIL NFR BLD AUTO: 0.9 % — SIGNIFICANT CHANGE UP (ref 0–6)
GAS PNL BLDA: SIGNIFICANT CHANGE UP
GLUCOSE BLDC GLUCOMTR-MCNC: 124 MG/DL — HIGH (ref 70–99)
GLUCOSE BLDC GLUCOMTR-MCNC: 126 MG/DL — HIGH (ref 70–99)
GLUCOSE BLDC GLUCOMTR-MCNC: 152 MG/DL — HIGH (ref 70–99)
GLUCOSE BLDC GLUCOMTR-MCNC: 192 MG/DL — HIGH (ref 70–99)
GLUCOSE SERPL-MCNC: 191 MG/DL — HIGH (ref 70–99)
HCT VFR BLD CALC: 24.1 % — LOW (ref 34.5–45)
HGB BLD-MCNC: 8.2 G/DL — LOW (ref 11.5–15.5)
IMM GRANULOCYTES NFR BLD AUTO: 0.5 % — SIGNIFICANT CHANGE UP (ref 0–1.5)
LYMPHOCYTES # BLD AUTO: 17.3 % — SIGNIFICANT CHANGE UP (ref 13–44)
LYMPHOCYTES # BLD AUTO: 2.48 K/UL — SIGNIFICANT CHANGE UP (ref 1–3.3)
MAGNESIUM SERPL-MCNC: 1.7 MG/DL — SIGNIFICANT CHANGE UP (ref 1.6–2.6)
MCHC RBC-ENTMCNC: 30.1 PG — SIGNIFICANT CHANGE UP (ref 27–34)
MCHC RBC-ENTMCNC: 34 GM/DL — SIGNIFICANT CHANGE UP (ref 32–36)
MCV RBC AUTO: 88.6 FL — SIGNIFICANT CHANGE UP (ref 80–100)
MONOCYTES # BLD AUTO: 1.53 K/UL — HIGH (ref 0–0.9)
MONOCYTES NFR BLD AUTO: 10.7 % — SIGNIFICANT CHANGE UP (ref 2–14)
NEUTROPHILS # BLD AUTO: 10.07 K/UL — HIGH (ref 1.8–7.4)
NEUTROPHILS NFR BLD AUTO: 70.5 % — SIGNIFICANT CHANGE UP (ref 43–77)
NRBC # BLD: 0 /100 WBCS — SIGNIFICANT CHANGE UP (ref 0–0)
PHOSPHATE SERPL-MCNC: 3.4 MG/DL — SIGNIFICANT CHANGE UP (ref 2.5–4.5)
PLATELET # BLD AUTO: 89 K/UL — LOW (ref 150–400)
POTASSIUM SERPL-MCNC: 4 MMOL/L — SIGNIFICANT CHANGE UP (ref 3.5–5.3)
POTASSIUM SERPL-SCNC: 4 MMOL/L — SIGNIFICANT CHANGE UP (ref 3.5–5.3)
PROT SERPL-MCNC: 4.9 G/DL — LOW (ref 6–8.3)
RBC # BLD: 2.72 M/UL — LOW (ref 3.8–5.2)
RBC # FLD: 14.8 % — HIGH (ref 10.3–14.5)
RH IG SCN BLD-IMP: POSITIVE — SIGNIFICANT CHANGE UP
SODIUM SERPL-SCNC: 134 MMOL/L — LOW (ref 135–145)
WBC # BLD: 14.3 K/UL — HIGH (ref 3.8–10.5)
WBC # FLD AUTO: 14.3 K/UL — HIGH (ref 3.8–10.5)

## 2021-10-03 PROCEDURE — 99233 SBSQ HOSP IP/OBS HIGH 50: CPT

## 2021-10-03 PROCEDURE — 93010 ELECTROCARDIOGRAM REPORT: CPT

## 2021-10-03 PROCEDURE — 71045 X-RAY EXAM CHEST 1 VIEW: CPT | Mod: 26,76

## 2021-10-03 RX ORDER — OXYCODONE HYDROCHLORIDE 5 MG/1
5 TABLET ORAL EVERY 6 HOURS
Refills: 0 | Status: DISCONTINUED | OUTPATIENT
Start: 2021-10-03 | End: 2021-10-06

## 2021-10-03 RX ORDER — HEPARIN SODIUM 5000 [USP'U]/ML
5000 INJECTION INTRAVENOUS; SUBCUTANEOUS EVERY 8 HOURS
Refills: 0 | Status: DISCONTINUED | OUTPATIENT
Start: 2021-10-03 | End: 2021-10-04

## 2021-10-03 RX ORDER — OXYCODONE HYDROCHLORIDE 5 MG/1
5 TABLET ORAL EVERY 4 HOURS
Refills: 0 | Status: DISCONTINUED | OUTPATIENT
Start: 2021-10-03 | End: 2021-10-03

## 2021-10-03 RX ORDER — CLOPIDOGREL BISULFATE 75 MG/1
75 TABLET, FILM COATED ORAL DAILY
Refills: 0 | Status: DISCONTINUED | OUTPATIENT
Start: 2021-10-03 | End: 2021-10-06

## 2021-10-03 RX ORDER — INSULIN GLARGINE 100 [IU]/ML
12 INJECTION, SOLUTION SUBCUTANEOUS AT BEDTIME
Refills: 0 | Status: DISCONTINUED | OUTPATIENT
Start: 2021-10-03 | End: 2021-10-04

## 2021-10-03 RX ORDER — SODIUM CHLORIDE 9 MG/ML
3 INJECTION INTRAMUSCULAR; INTRAVENOUS; SUBCUTANEOUS EVERY 8 HOURS
Refills: 0 | Status: DISCONTINUED | OUTPATIENT
Start: 2021-10-03 | End: 2021-10-06

## 2021-10-03 RX ORDER — METOPROLOL TARTRATE 50 MG
50 TABLET ORAL EVERY 8 HOURS
Refills: 0 | Status: DISCONTINUED | OUTPATIENT
Start: 2021-10-03 | End: 2021-10-04

## 2021-10-03 RX ORDER — MAGNESIUM SULFATE 500 MG/ML
1 VIAL (ML) INJECTION ONCE
Refills: 0 | Status: COMPLETED | OUTPATIENT
Start: 2021-10-03 | End: 2021-10-03

## 2021-10-03 RX ADMIN — SODIUM CHLORIDE 10 MILLILITER(S): 9 INJECTION INTRAMUSCULAR; INTRAVENOUS; SUBCUTANEOUS at 08:00

## 2021-10-03 RX ADMIN — CHLORHEXIDINE GLUCONATE 1 APPLICATION(S): 213 SOLUTION TOPICAL at 05:22

## 2021-10-03 RX ADMIN — CLOPIDOGREL BISULFATE 75 MILLIGRAM(S): 75 TABLET, FILM COATED ORAL at 12:21

## 2021-10-03 RX ADMIN — SODIUM CHLORIDE 3 MILLILITER(S): 9 INJECTION INTRAMUSCULAR; INTRAVENOUS; SUBCUTANEOUS at 14:07

## 2021-10-03 RX ADMIN — SODIUM CHLORIDE 3 MILLILITER(S): 9 INJECTION INTRAMUSCULAR; INTRAVENOUS; SUBCUTANEOUS at 22:06

## 2021-10-03 RX ADMIN — PANTOPRAZOLE SODIUM 40 MILLIGRAM(S): 20 TABLET, DELAYED RELEASE ORAL at 06:45

## 2021-10-03 RX ADMIN — Medication 650 MILLIGRAM(S): at 21:10

## 2021-10-03 RX ADMIN — OXYCODONE HYDROCHLORIDE 5 MILLIGRAM(S): 5 TABLET ORAL at 05:51

## 2021-10-03 RX ADMIN — Medication 50 MILLIGRAM(S): at 10:58

## 2021-10-03 RX ADMIN — ATORVASTATIN CALCIUM 40 MILLIGRAM(S): 80 TABLET, FILM COATED ORAL at 21:48

## 2021-10-03 RX ADMIN — Medication 100 MILLIGRAM(S): at 00:57

## 2021-10-03 RX ADMIN — OXYCODONE HYDROCHLORIDE 5 MILLIGRAM(S): 5 TABLET ORAL at 05:21

## 2021-10-03 RX ADMIN — Medication 50 MILLIGRAM(S): at 21:47

## 2021-10-03 RX ADMIN — Medication 2: at 07:44

## 2021-10-03 RX ADMIN — HEPARIN SODIUM 5000 UNIT(S): 5000 INJECTION INTRAVENOUS; SUBCUTANEOUS at 14:07

## 2021-10-03 RX ADMIN — Medication 50 MILLIGRAM(S): at 02:25

## 2021-10-03 RX ADMIN — HEPARIN SODIUM 5000 UNIT(S): 5000 INJECTION INTRAVENOUS; SUBCUTANEOUS at 21:47

## 2021-10-03 RX ADMIN — Medication 81 MILLIGRAM(S): at 11:53

## 2021-10-03 RX ADMIN — INSULIN GLARGINE 12 UNIT(S): 100 INJECTION, SOLUTION SUBCUTANEOUS at 21:47

## 2021-10-03 RX ADMIN — Medication 100 GRAM(S): at 02:25

## 2021-10-03 RX ADMIN — LOSARTAN POTASSIUM 25 MILLIGRAM(S): 100 TABLET, FILM COATED ORAL at 05:21

## 2021-10-03 RX ADMIN — Medication 650 MILLIGRAM(S): at 20:18

## 2021-10-03 RX ADMIN — OXYCODONE HYDROCHLORIDE 5 MILLIGRAM(S): 5 TABLET ORAL at 13:52

## 2021-10-03 NOTE — PROGRESS NOTE ADULT - SUBJECTIVE AND OBJECTIVE BOX
Chief complaint  Patient is a 71y old  Female who presents with a chief complaint of multivessel CAD (03 Oct 2021 06:42)   Review of systems  Patient awake in chair, looks comfortable, no hypoglycemic episodes. Family at bedside.    Labs and Fingersticks  CAPILLARY BLOOD GLUCOSE  142 (02 Oct 2021 17:00)  116 (02 Oct 2021 15:00)      POCT Blood Glucose.: 126 mg/dL (03 Oct 2021 11:00)  POCT Blood Glucose.: 152 mg/dL (03 Oct 2021 06:57)  POCT Blood Glucose.: 203 mg/dL (02 Oct 2021 22:25)  POCT Blood Glucose.: 142 mg/dL (02 Oct 2021 16:53)  POCT Blood Glucose.: 116 mg/dL (02 Oct 2021 14:51)  POCT Blood Glucose.: 82 mg/dL (02 Oct 2021 14:13)      Anion Gap, Serum: 11 (10-03 @ 00:55)  Anion Gap, Serum: 13 (10-02 @ 00:20)      Calcium, Total Serum: 8.2 *L* (10-03 @ 00:55)  Calcium, Total Serum: 8.0 *L* (10-02 @ 00:20)  Albumin, Serum: 3.1 *L* (10-03 @ 00:55)  Albumin, Serum: 3.4 (10-02 @ 00:20)    Alanine Aminotransferase (ALT/SGPT): 30 (10-03 @ 00:55)  Alanine Aminotransferase (ALT/SGPT): 37 (10-02 @ 00:20)  Alkaline Phosphatase, Serum: 45 (10-03 @ 00:55)  Alkaline Phosphatase, Serum: 38 *L* (10-02 @ 00:20)  Aspartate Aminotransferase (AST/SGOT): 42 *H* (10-03 @ 00:55)  Aspartate Aminotransferase (AST/SGOT): 71 *H* (10-02 @ 00:20)        10-03    134<L>  |  100  |  23  ----------------------------<  191<H>  4.0   |  23  |  1.01    Ca    8.2<L>      03 Oct 2021 00:55  Phos  3.4     10-03  Mg     1.7     10-03    TPro  4.9<L>  /  Alb  3.1<L>  /  TBili  0.9  /  DBili  x   /  AST  42<H>  /  ALT  30  /  AlkPhos  45  10-03                        8.2    14.30 )-----------( 89       ( 03 Oct 2021 00:55 )             24.1     Medications  MEDICATIONS  (STANDING):  aspirin enteric coated 81 milliGRAM(s) Oral daily  atorvastatin 40 milliGRAM(s) Oral at bedtime  chlorhexidine 2% Cloths 1 Application(s) Topical <User Schedule>  clopidogrel Tablet 75 milliGRAM(s) Oral daily  heparin   Injectable 5000 Unit(s) SubCutaneous every 8 hours  insulin glargine Injectable (LANTUS) 12 Unit(s) SubCutaneous at bedtime  insulin lispro (ADMELOG) corrective regimen sliding scale   SubCutaneous three times a day before meals  insulin lispro (ADMELOG) corrective regimen sliding scale   SubCutaneous at bedtime  losartan 25 milliGRAM(s) Oral daily  metoprolol tartrate 50 milliGRAM(s) Oral every 8 hours  pantoprazole    Tablet 40 milliGRAM(s) Oral before breakfast  polyethylene glycol 3350 17 Gram(s) Oral daily  sodium chloride 0.9% lock flush 3 milliLiter(s) IV Push every 8 hours  sodium chloride 0.9%. 1000 milliLiter(s) (10 mL/Hr) IV Continuous <Continuous>      Physical Exam  General: Patient comfortable in bed  Vital Signs Last 12 Hrs  T(F): 98.1 (10-03-21 @ 13:43), Max: 99.2 (10-03-21 @ 04:00)  HR: 70 (10-03-21 @ 13:43) (70 - 96)  BP: 110/69 (10-03-21 @ 13:43) (110/69 - 143/67)  BP(mean): 94 (10-03-21 @ 13:00) (94 - 97)  RR: 18 (10-03-21 @ 13:43) (12 - 45)  SpO2: 92% (10-03-21 @ 13:43) (92% - 100%)  Neck: No palpable thyroid nodules.  CVS: S1S2, No murmurs  Respiratory: No wheezing, no crepitations  GI: Abdomen soft, bowel sounds positive  Musculoskeletal:  edema lower extremities.   Skin: No skin rashes, no ecchymosis    Diagnostics             Chief complaint  Patient is a 71y old  Female who presents with a chief complaint of multivessel CAD (03 Oct 2021 06:42)   Review of systems  Patient awake in chair, looks comfortable, no hypoglycemic episodes. Family at bedside.    Labs and Fingersticks  CAPILLARY BLOOD GLUCOSE  142 (02 Oct 2021 17:00)  116 (02 Oct 2021 15:00)      POCT Blood Glucose.: 126 mg/dL (03 Oct 2021 11:00)  POCT Blood Glucose.: 152 mg/dL (03 Oct 2021 06:57)  POCT Blood Glucose.: 203 mg/dL (02 Oct 2021 22:25)  POCT Blood Glucose.: 142 mg/dL (02 Oct 2021 16:53)  POCT Blood Glucose.: 116 mg/dL (02 Oct 2021 14:51)  POCT Blood Glucose.: 82 mg/dL (02 Oct 2021 14:13)      Anion Gap, Serum: 11 (10-03 @ 00:55)  Anion Gap, Serum: 13 (10-02 @ 00:20)      Calcium, Total Serum: 8.2 *L* (10-03 @ 00:55)  Calcium, Total Serum: 8.0 *L* (10-02 @ 00:20)  Albumin, Serum: 3.1 *L* (10-03 @ 00:55)  Albumin, Serum: 3.4 (10-02 @ 00:20)    Alanine Aminotransferase (ALT/SGPT): 30 (10-03 @ 00:55)  Alanine Aminotransferase (ALT/SGPT): 37 (10-02 @ 00:20)  Alkaline Phosphatase, Serum: 45 (10-03 @ 00:55)  Alkaline Phosphatase, Serum: 38 *L* (10-02 @ 00:20)  Aspartate Aminotransferase (AST/SGOT): 42 *H* (10-03 @ 00:55)  Aspartate Aminotransferase (AST/SGOT): 71 *H* (10-02 @ 00:20)        10-03    134<L>  |  100  |  23  ----------------------------<  191<H>  4.0   |  23  |  1.01    Ca    8.2<L>      03 Oct 2021 00:55  Phos  3.4     10-03  Mg     1.7     10-03    TPro  4.9<L>  /  Alb  3.1<L>  /  TBili  0.9  /  DBili  x   /  AST  42<H>  /  ALT  30  /  AlkPhos  45  10-03                        8.2    14.30 )-----------( 89       ( 03 Oct 2021 00:55 )             24.1     Medications  MEDICATIONS  (STANDING):  aspirin enteric coated 81 milliGRAM(s) Oral daily  atorvastatin 40 milliGRAM(s) Oral at bedtime  chlorhexidine 2% Cloths 1 Application(s) Topical <User Schedule>  clopidogrel Tablet 75 milliGRAM(s) Oral daily  heparin   Injectable 5000 Unit(s) SubCutaneous every 8 hours  insulin glargine Injectable (LANTUS) 12 Unit(s) SubCutaneous at bedtime  insulin lispro (ADMELOG) corrective regimen sliding scale   SubCutaneous three times a day before meals  insulin lispro (ADMELOG) corrective regimen sliding scale   SubCutaneous at bedtime  losartan 25 milliGRAM(s) Oral daily  metoprolol tartrate 50 milliGRAM(s) Oral every 8 hours  pantoprazole    Tablet 40 milliGRAM(s) Oral before breakfast  polyethylene glycol 3350 17 Gram(s) Oral daily  sodium chloride 0.9% lock flush 3 milliLiter(s) IV Push every 8 hours  sodium chloride 0.9%. 1000 milliLiter(s) (10 mL/Hr) IV Continuous <Continuous>      Physical Exam  General: Patient comfortable in bed  Vital Signs Last 12 Hrs  T(F): 98.1 (10-03-21 @ 13:43), Max: 99.2 (10-03-21 @ 04:00)  HR: 70 (10-03-21 @ 13:43) (70 - 96)  BP: 110/69 (10-03-21 @ 13:43) (110/69 - 143/67)  BP(mean): 94 (10-03-21 @ 13:00) (94 - 97)  RR: 18 (10-03-21 @ 13:43) (12 - 45)  SpO2: 92% (10-03-21 @ 13:43) (92% - 100%)  Neck: No palpable thyroid nodules.  CVS: S1S2, No murmurs  Respiratory: No wheezing, no crepitations  GI: Abdomen soft, bowel sounds positive  Musculoskeletal:  edema lower extremities.   Skin: No skin rashes, no ecchymosis    Diagnostics

## 2021-10-03 NOTE — PROGRESS NOTE ADULT - ASSESSMENT
Assessment  DMT2: 71y Female with DM T2 with hyperglycemia, A1C 7.5%, was on oral meds at home, postop on basal insulin and coverage, blood sugars improving, no hypoglycemic episodes. Patient appears comfortable, transferred out of icu, not eating much per discussion with family at bedside.  CAD: s/p CABG, on medications, stable, monitored.  HTN: Controlled,  on antihypertensive medications.  HLD: On statin      Noam Ng MD  Cell: [phone]  Office: [phone]     Assessment  DMT2: 71y Female with DM T2 with hyperglycemia, A1C 7.5%, was on oral meds at home, postop on basal insulin and coverage, blood sugars improving, no hypoglycemic episodes. Patient appears comfortable, transferred out of icu, not eating much  per discussion with family at bedside.  CAD: s/p CABG, on medications, stable, monitored.  HTN: Controlled,  on antihypertensive medications.  HLD: On statin      Noam Ng MD  Cell: [phone]  Office: [phone]

## 2021-10-03 NOTE — PROGRESS NOTE ADULT - SUBJECTIVE AND OBJECTIVE BOX
VITAL SIGNS    Telemetry:      Vital Signs Last 24 Hrs  T(C): 36.7 (10-03-21 @ 13:43), Max: 37.6 (10-02-21 @ 20:00)  T(F): 98.1 (10-03-21 @ 13:43), Max: 99.6 (10-02-21 @ 20:00)  HR: 70 (10-03-21 @ 13:43) (70 - 106)  BP: 110/69 (10-03-21 @ 13:43) (110/69 - 143/67)  RR: 18 (10-03-21 @ 13:43) (12 - 45)  SpO2: 92% (10-03-21 @ 13:43) (92% - 100%)                   Daily     Daily Weight in k (03 Oct 2021 00:00)      Bilirubin Total, Serum: 0.9 mg/dL (10-03 @ 00:55)    CAPILLARY BLOOD GLUCOSE  142 (02 Oct 2021 17:00)      POCT Blood Glucose.: 126 mg/dL (03 Oct 2021 11:00)  POCT Blood Glucose.: 152 mg/dL (03 Oct 2021 06:57)  POCT Blood Glucose.: 203 mg/dL (02 Oct 2021 22:25)  POCT Blood Glucose.: 142 mg/dL (02 Oct 2021 16:53)            Pacing Wires        [  ]   Settings:                                            PHYSICAL EXAM    Neurology: alert and oriented x 3, moves all extremities with no defecits  forgetful  CV :  RRR  Sternal Wound :  CDI , Stable  + pw  Lungs:   CTA B/L  Abdomen: soft, nontender, nondistended, positive bowel sounds,  Extremities:     bl    svg sight   ace wraps

## 2021-10-03 NOTE — PROGRESS NOTE ADULT - ASSESSMENT
71  female sp   C3L   H    retired RN   HTN,    DM  a1c 7%  post op course unremarkable  10/3   trf too floor   NSR   plavix  asa lop50 q12   PW to EPM,   forgetful   decreased narcotics  BL  SVG

## 2021-10-03 NOTE — PROGRESS NOTE ADULT - PROBLEM SELECTOR PLAN 1
Will increase Lantus to 12 units at bedtime and Admelog correction scale coverage. Will continue monitoring FS and FU.  Patient counseled for compliance with consistent low carb diet.

## 2021-10-03 NOTE — PROGRESS NOTE ADULT - SUBJECTIVE AND OBJECTIVE BOX
VITAL SIGNS    Telemetry:     SR/ST  Vital Signs Last 24 Hrs  T(C): 36.7 (10-03-21 @ 13:43), Max: 37.6 (10-02-21 @ 20:00)  T(F): 98.1 (10-03-21 @ 13:43), Max: 99.6 (10-02-21 @ 20:00)  HR: 70 (10-03-21 @ 13:43) (70 - 106)  BP: 110/69 (10-03-21 @ 13:43) (110/69 - 143/67)  RR: 18 (10-03-21 @ 13:43) (12 - 45)  SpO2: 92% (10-03-21 @ 13:43) (92% - 100%)                   Daily     Daily Weight in k (03 Oct 2021 00:00)      Bilirubin Total, Serum: 0.9 mg/dL (10-03 @ 00:55)    CAPILLARY BLOOD GLUCOSE  142 (02 Oct 2021 17:00)      POCT Blood Glucose.: 126 mg/dL (03 Oct 2021 11:00)  POCT Blood Glucose.: 152 mg/dL (03 Oct 2021 06:57)  POCT Blood Glucose.: 203 mg/dL (02 Oct 2021 22:25)  POCT Blood Glucose.: 142 mg/dL (02 Oct 2021 16:53)        Pacing Wires        [  ]   Settings:                                                     PHYSICAL EXAM    Neurology: alert and oriented x 3, moves all extremities with no defecits,   forgetful  CV :  RRR  Sternal Wound :  CDI , Stable  + pw  Lungs:   CTA B/L  Abdomen: soft, nontender, nondistended, positive bowel sounds,   Extremities:     no edema    BL SVG

## 2021-10-03 NOTE — PROGRESS NOTE ADULT - SUBJECTIVE AND OBJECTIVE BOX
CRITICAL CARE ATTENDING - CTICU    MEDICATIONS  (STANDING):  aspirin enteric coated 81 milliGRAM(s) Oral daily  atorvastatin 40 milliGRAM(s) Oral at bedtime  chlorhexidine 2% Cloths 1 Application(s) Topical <User Schedule>  dextrose 50% Injectable 50 milliLiter(s) IV Push every 15 minutes  insulin glargine Injectable (LANTUS) 10 Unit(s) SubCutaneous at bedtime  insulin lispro (ADMELOG) corrective regimen sliding scale   SubCutaneous three times a day before meals  insulin lispro (ADMELOG) corrective regimen sliding scale   SubCutaneous at bedtime  losartan 25 milliGRAM(s) Oral daily  metoprolol tartrate 50 milliGRAM(s) Oral every 8 hours  pantoprazole    Tablet 40 milliGRAM(s) Oral before breakfast  polyethylene glycol 3350 17 Gram(s) Oral daily  sodium chloride 0.9%. 1000 milliLiter(s) (10 mL/Hr) IV Continuous <Continuous>                                    8.2    14.30 )-----------( 89       ( 03 Oct 2021 00:55 )             24.1       10    134<L>  |  100  |  23  ----------------------------<  191<H>  4.0   |  23  |  1.01    Ca    8.2<L>      03 Oct 2021 00:55  Phos  3.4     10-03  Mg     1.7     10-03    TPro  4.9<L>  /  Alb  3.1<L>  /  TBili  0.9  /  DBili  x   /  AST  42<H>  /  ALT  30  /  AlkPhos  45  10-03      PT/INR - ( 01 Oct 2021 13:20 )   PT: 21.5 sec;   INR: 1.84 ratio         PTT - ( 01 Oct 2021 13:20 )  PTT:47.4 sec        Daily     Daily Weight in k (03 Oct 2021 00:00)      10-01 @ 07:01  -  10-02 @ 07:00  --------------------------------------------------------  IN: 2726.2 mL / OUT: 2893 mL / NET: -166.8 mL    10-02 @ 07:01  -  10-03 @ 06:42  --------------------------------------------------------  IN: 445 mL / OUT: 1419 mL / NET: -974 mL        Critically Ill patient  : [ ] preoperative ,   [x] post operative    Requires :  [x ] Arterial Line   [x ] Central Line  [ ] PA catheter  [ ] IABP  [ ] ECMO  [ ] LVAD  [ ] Ventilator  [x ] pacemaker - TPM  [ ] Impella.                      [x ] ABG's     [ x] Pulse Oxymetry Monitoring  Bedside evaluation , monitoring , treatment of hemodynamics , fluids , IVP/ IVCD meds.        Diagnosis:     Admitted NSTEMI    POD 2 - CABG X 3 L    Hypertension     diabetes mellitus type 2 - IVCD Insukin    Hypovolemia    Thrombocytopenia     Requires chest PT, pulmonary toilet, suctioning to maintain SaO2,  patent airway and treat atelectasis.      Requires bedside physical therapy, mobilization and total [facility] care.     Chest Tube Drainage     ECG     Temporary pacemaker (TPM) interrogation and setting.     CHF- acute [x]   chronic [x ]    systolic x ]   diatolic [ ]          - Echo- EF -   40-45%        [ ] RV dysfunction          - Cxr-cardiomegally, edema          - Clinical-  [ ]inotropes   [ ]pressors   [ x]diuresis   [ ]IABP   [ ]ECMO   [ ]LVAD   [ ]Respiratory Failure                      By signing my name below, I, Annabel Sigala, attest that this documentation has been prepared under the direction and in the presence of Josué Frazier MD.   Electronically Signed: Ian Oliveira 10-03-21 @ 06:42      Discussed with CT surgeon, Physician Assistant - Nurse Practitioner- Critical care medicine team.   Discussed at  AM / PM rounds.   Chart, labs , films reviewed.    Cumulative Critical Care Time Given Today:  CRITICAL CARE ATTENDING - CTICU    MEDICATIONS  (STANDING):  aspirin enteric coated 81 milliGRAM(s) Oral daily  atorvastatin 40 milliGRAM(s) Oral at bedtime  chlorhexidine 2% Cloths 1 Application(s) Topical <User Schedule>  dextrose 50% Injectable 50 milliLiter(s) IV Push every 15 minutes  insulin glargine Injectable (LANTUS) 10 Unit(s) SubCutaneous at bedtime  insulin lispro (ADMELOG) corrective regimen sliding scale   SubCutaneous three times a day before meals  insulin lispro (ADMELOG) corrective regimen sliding scale   SubCutaneous at bedtime  losartan 25 milliGRAM(s) Oral daily  metoprolol tartrate 50 milliGRAM(s) Oral every 8 hours  pantoprazole    Tablet 40 milliGRAM(s) Oral before breakfast  polyethylene glycol 3350 17 Gram(s) Oral daily  sodium chloride 0.9%. 1000 milliLiter(s) (10 mL/Hr) IV Continuous <Continuous>                                    8.2    14.30 )-----------( 89       ( 03 Oct 2021 00:55 )             24.1       10    134<L>  |  100  |  23  ----------------------------<  191<H>  4.0   |  23  |  1.01    Ca    8.2<L>      03 Oct 2021 00:55  Phos  3.4     10-03  Mg     1.7     10-03    TPro  4.9<L>  /  Alb  3.1<L>  /  TBili  0.9  /  DBili  x   /  AST  42<H>  /  ALT  30  /  AlkPhos  45  10-03      PT/INR - ( 01 Oct 2021 13:20 )   PT: 21.5 sec;   INR: 1.84 ratio         PTT - ( 01 Oct 2021 13:20 )  PTT:47.4 sec        Daily     Daily Weight in k (03 Oct 2021 00:00)      10-01 @ 07:01  -  10-02 @ 07:00  --------------------------------------------------------  IN: 2726.2 mL / OUT: 2893 mL / NET: -166.8 mL    10-02 @ 07:01  -  10-03 @ 06:42  --------------------------------------------------------  IN: 445 mL / OUT: 1419 mL / NET: -974 mL        Critically Ill patient  : [ ] preoperative ,   [x] post operative    Requires :  [x ] Arterial Line   [x ] Central Line  [ ] PA catheter  [ ] IABP  [ ] ECMO  [ ] LVAD  [ ] Ventilator  [x ] pacemaker - TPM  [ ] Impella.                      [x ] ABG's     [ x] Pulse Oxymetry Monitoring  Bedside evaluation , monitoring , treatment of hemodynamics , fluids , IVP/ IVCD meds.        Diagnosis:     Admitted NSTEMI    POD 2 - CABG X 3 L    Hypertension     diabetes mellitus type 2 - IVCD Insulin converted to Lantus + Admelog    Hypovolemia    Hyponatremia     Thrombocytopenia     Requires chest PT, pulmonary toilet, suctioning to maintain SaO2,  patent airway and treat atelectasis.      Requires bedside physical therapy, mobilization and total intermediate care.     Chest Tube Drainage     ECG     Temporary pacemaker (TPM) interrogation and setting.     CHF- acute [x]   chronic [x ]    systolic x ]   diatolic [ ]          - Echo- EF -   40-45%        [ ] RV dysfunction          - Cxr-cardiomegally, edema          - Clinical-  [ ]inotropes   [ ]pressors   [ x]diuresis   [ ]IABP   [ ]ECMO   [ ]LVAD   [ ]Respiratory Failure                      By signing my name below, I, Annabel Sigala, attest that this documentation has been prepared under the direction and in the presence of Josué Frazier MD.   Electronically Signed: Ian Oliveira 10-03-21 @ 06:42    I, Josué Frazier, personally performed the services described in this documentation. All medical record entries made by the scribe were at my direction and in my presence. I have reviewed the chart and agree that the record reflects my personal performance and is accurate and complete.   Josué Frazier MD.       Discussed with CT surgeon, Physician Assistant - Nurse Practitioner- Critical care medicine team.   Discussed at  AM / PM rounds.   Chart, labs , films reviewed.    Cumulative Critical Care Time Given Today:  20 min

## 2021-10-04 DIAGNOSIS — Z95.1 PRESENCE OF AORTOCORONARY BYPASS GRAFT: ICD-10-CM

## 2021-10-04 LAB
ANION GAP SERPL CALC-SCNC: 13 MMOL/L — SIGNIFICANT CHANGE UP (ref 5–17)
BLD GP AB SCN SERPL QL: NEGATIVE — SIGNIFICANT CHANGE UP
BUN SERPL-MCNC: 19 MG/DL — SIGNIFICANT CHANGE UP (ref 7–23)
CALCIUM SERPL-MCNC: 8.3 MG/DL — LOW (ref 8.4–10.5)
CHLORIDE SERPL-SCNC: 101 MMOL/L — SIGNIFICANT CHANGE UP (ref 96–108)
CO2 SERPL-SCNC: 24 MMOL/L — SIGNIFICANT CHANGE UP (ref 22–31)
CREAT SERPL-MCNC: 0.75 MG/DL — SIGNIFICANT CHANGE UP (ref 0.5–1.3)
GLUCOSE BLDC GLUCOMTR-MCNC: 151 MG/DL — HIGH (ref 70–99)
GLUCOSE BLDC GLUCOMTR-MCNC: 166 MG/DL — HIGH (ref 70–99)
GLUCOSE BLDC GLUCOMTR-MCNC: 176 MG/DL — HIGH (ref 70–99)
GLUCOSE BLDC GLUCOMTR-MCNC: 182 MG/DL — HIGH (ref 70–99)
GLUCOSE BLDC GLUCOMTR-MCNC: 217 MG/DL — HIGH (ref 70–99)
GLUCOSE SERPL-MCNC: 157 MG/DL — HIGH (ref 70–99)
HCT VFR BLD CALC: 24.3 % — LOW (ref 34.5–45)
HGB BLD-MCNC: 8.1 G/DL — LOW (ref 11.5–15.5)
MCHC RBC-ENTMCNC: 30.2 PG — SIGNIFICANT CHANGE UP (ref 27–34)
MCHC RBC-ENTMCNC: 33.3 GM/DL — SIGNIFICANT CHANGE UP (ref 32–36)
MCV RBC AUTO: 90.7 FL — SIGNIFICANT CHANGE UP (ref 80–100)
NRBC # BLD: 0 /100 WBCS — SIGNIFICANT CHANGE UP (ref 0–0)
PLATELET # BLD AUTO: 134 K/UL — LOW (ref 150–400)
POTASSIUM SERPL-MCNC: 4.1 MMOL/L — SIGNIFICANT CHANGE UP (ref 3.5–5.3)
POTASSIUM SERPL-SCNC: 4.1 MMOL/L — SIGNIFICANT CHANGE UP (ref 3.5–5.3)
RBC # BLD: 2.68 M/UL — LOW (ref 3.8–5.2)
RBC # FLD: 14.2 % — SIGNIFICANT CHANGE UP (ref 10.3–14.5)
RH IG SCN BLD-IMP: POSITIVE — SIGNIFICANT CHANGE UP
SODIUM SERPL-SCNC: 138 MMOL/L — SIGNIFICANT CHANGE UP (ref 135–145)
WBC # BLD: 10.31 K/UL — SIGNIFICANT CHANGE UP (ref 3.8–10.5)
WBC # FLD AUTO: 10.31 K/UL — SIGNIFICANT CHANGE UP (ref 3.8–10.5)

## 2021-10-04 PROCEDURE — 71045 X-RAY EXAM CHEST 1 VIEW: CPT | Mod: 26

## 2021-10-04 RX ORDER — GLUCAGON INJECTION, SOLUTION 0.5 MG/.1ML
1 INJECTION, SOLUTION SUBCUTANEOUS ONCE
Refills: 0 | Status: DISCONTINUED | OUTPATIENT
Start: 2021-10-04 | End: 2021-10-06

## 2021-10-04 RX ORDER — DEXTROSE 50 % IN WATER 50 %
25 SYRINGE (ML) INTRAVENOUS ONCE
Refills: 0 | Status: DISCONTINUED | OUTPATIENT
Start: 2021-10-04 | End: 2021-10-06

## 2021-10-04 RX ORDER — FOLIC ACID 0.8 MG
1 TABLET ORAL DAILY
Refills: 0 | Status: DISCONTINUED | OUTPATIENT
Start: 2021-10-04 | End: 2021-10-06

## 2021-10-04 RX ORDER — METOPROLOL TARTRATE 50 MG
75 TABLET ORAL THREE TIMES A DAY
Refills: 0 | Status: DISCONTINUED | OUTPATIENT
Start: 2021-10-04 | End: 2021-10-06

## 2021-10-04 RX ORDER — FERROUS SULFATE 325(65) MG
325 TABLET ORAL DAILY
Refills: 0 | Status: DISCONTINUED | OUTPATIENT
Start: 2021-10-04 | End: 2021-10-06

## 2021-10-04 RX ORDER — DEXTROSE 50 % IN WATER 50 %
15 SYRINGE (ML) INTRAVENOUS ONCE
Refills: 0 | Status: DISCONTINUED | OUTPATIENT
Start: 2021-10-04 | End: 2021-10-06

## 2021-10-04 RX ORDER — LOSARTAN POTASSIUM 100 MG/1
50 TABLET, FILM COATED ORAL DAILY
Refills: 0 | Status: DISCONTINUED | OUTPATIENT
Start: 2021-10-04 | End: 2021-10-06

## 2021-10-04 RX ORDER — ENOXAPARIN SODIUM 100 MG/ML
40 INJECTION SUBCUTANEOUS DAILY
Refills: 0 | Status: DISCONTINUED | OUTPATIENT
Start: 2021-10-04 | End: 2021-10-06

## 2021-10-04 RX ORDER — INSULIN GLARGINE 100 [IU]/ML
14 INJECTION, SOLUTION SUBCUTANEOUS AT BEDTIME
Refills: 0 | Status: DISCONTINUED | OUTPATIENT
Start: 2021-10-04 | End: 2021-10-06

## 2021-10-04 RX ORDER — DEXTROSE 50 % IN WATER 50 %
12.5 SYRINGE (ML) INTRAVENOUS ONCE
Refills: 0 | Status: DISCONTINUED | OUTPATIENT
Start: 2021-10-04 | End: 2021-10-06

## 2021-10-04 RX ORDER — INSULIN LISPRO 100/ML
4 VIAL (ML) SUBCUTANEOUS
Refills: 0 | Status: DISCONTINUED | OUTPATIENT
Start: 2021-10-04 | End: 2021-10-06

## 2021-10-04 RX ADMIN — LOSARTAN POTASSIUM 25 MILLIGRAM(S): 100 TABLET, FILM COATED ORAL at 05:47

## 2021-10-04 RX ADMIN — HEPARIN SODIUM 5000 UNIT(S): 5000 INJECTION INTRAVENOUS; SUBCUTANEOUS at 05:48

## 2021-10-04 RX ADMIN — OXYCODONE HYDROCHLORIDE 5 MILLIGRAM(S): 5 TABLET ORAL at 14:55

## 2021-10-04 RX ADMIN — Medication 1 MILLIGRAM(S): at 14:04

## 2021-10-04 RX ADMIN — Medication 2: at 12:00

## 2021-10-04 RX ADMIN — SODIUM CHLORIDE 3 MILLILITER(S): 9 INJECTION INTRAMUSCULAR; INTRAVENOUS; SUBCUTANEOUS at 21:50

## 2021-10-04 RX ADMIN — INSULIN GLARGINE 14 UNIT(S): 100 INJECTION, SOLUTION SUBCUTANEOUS at 21:35

## 2021-10-04 RX ADMIN — Medication 50 MILLIGRAM(S): at 05:48

## 2021-10-04 RX ADMIN — ENOXAPARIN SODIUM 40 MILLIGRAM(S): 100 INJECTION SUBCUTANEOUS at 14:05

## 2021-10-04 RX ADMIN — Medication 325 MILLIGRAM(S): at 14:04

## 2021-10-04 RX ADMIN — CHLORHEXIDINE GLUCONATE 1 APPLICATION(S): 213 SOLUTION TOPICAL at 10:18

## 2021-10-04 RX ADMIN — PANTOPRAZOLE SODIUM 40 MILLIGRAM(S): 20 TABLET, DELAYED RELEASE ORAL at 06:13

## 2021-10-04 RX ADMIN — Medication 81 MILLIGRAM(S): at 13:55

## 2021-10-04 RX ADMIN — OXYCODONE HYDROCHLORIDE 5 MILLIGRAM(S): 5 TABLET ORAL at 14:05

## 2021-10-04 RX ADMIN — CLOPIDOGREL BISULFATE 75 MILLIGRAM(S): 75 TABLET, FILM COATED ORAL at 13:55

## 2021-10-04 RX ADMIN — Medication 75 MILLIGRAM(S): at 13:56

## 2021-10-04 RX ADMIN — SODIUM CHLORIDE 3 MILLILITER(S): 9 INJECTION INTRAMUSCULAR; INTRAVENOUS; SUBCUTANEOUS at 05:02

## 2021-10-04 RX ADMIN — POLYETHYLENE GLYCOL 3350 17 GRAM(S): 17 POWDER, FOR SOLUTION ORAL at 13:55

## 2021-10-04 RX ADMIN — SODIUM CHLORIDE 3 MILLILITER(S): 9 INJECTION INTRAMUSCULAR; INTRAVENOUS; SUBCUTANEOUS at 14:06

## 2021-10-04 RX ADMIN — Medication 650 MILLIGRAM(S): at 10:50

## 2021-10-04 RX ADMIN — Medication 2: at 09:00

## 2021-10-04 RX ADMIN — Medication 2: at 17:25

## 2021-10-04 RX ADMIN — Medication 650 MILLIGRAM(S): at 10:00

## 2021-10-04 RX ADMIN — Medication 75 MILLIGRAM(S): at 21:35

## 2021-10-04 RX ADMIN — LOSARTAN POTASSIUM 50 MILLIGRAM(S): 100 TABLET, FILM COATED ORAL at 14:05

## 2021-10-04 RX ADMIN — ATORVASTATIN CALCIUM 40 MILLIGRAM(S): 80 TABLET, FILM COATED ORAL at 21:35

## 2021-10-04 NOTE — PROGRESS NOTE ADULT - PROBLEM SELECTOR PLAN 1
lantus  diabetes diet  endo following [facility] ac and hs  continue lantus   diabetes diet  endo following

## 2021-10-04 NOTE — PROGRESS NOTE ADULT - ASSESSMENT
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 Edmond

## 2021-10-04 NOTE — PROGRESS NOTE ADULT - ASSESSMENT
Assessment  DMT2: 71y Female with DM T2 with hyperglycemia, A1C 7.5%, was on oral meds at home, postop on basal insulin and coverage, increased Lantus dose yesterday, blood sugars are fluctuating within overall acceptable range, no hypoglycemic episodes, not eating much, inconsistent carb intake.  CAD: s/p CABG, on medications, stable, monitored.  HTN: Controlled,  on antihypertensive medications.  HLD: On statin      Noam Ng MD  Cell: [phone]  Office: [phone]     Assessment  DMT2: 71y Female with DM T2 with hyperglycemia, A1C 7.5%, was on oral meds at home, postop on basal insulin and coverage, increased Lantus dose yesterday, blood sugars are fluctuating/running slightly high and not at postop target, no hypoglycemic episodes, eating inconsistently.  CAD: s/p CABG, on medications, stable, monitored.  HTN: Controlled,  on antihypertensive medications.  HLD: On statin      Noam Ng MD  Cell: [phone]  Office: [phone]

## 2021-10-04 NOTE — PROGRESS NOTE ADULT - PROBLEM SELECTOR PLAN 1
Will increase Lantus to 14 units at bedtime and continue Admelog correction scale coverage. Will continue monitoring FS and FU.  Patient counseled for compliance with consistent low carb diet. Patient eats inconsistently and has risk for hypoglycemias- Please hold standing-dose Admelog if she is not eating.  Will increase Lantus to 14 units at bedtime.  Will start Admelog 4u TID before meals and continue Admelog correction scale coverage. Will continue monitoring FS and FU.  Patient counseled for compliance with consistent low carb diet.

## 2021-10-04 NOTE — PROGRESS NOTE ADULT - SUBJECTIVE AND OBJECTIVE BOX
VITAL SIGNS    Telemetry:    Vital Signs Last 24 Hrs  T(C): 36.9 (10-04-21 @ 05:15), Max: 37.3 (10-03-21 @ 12:00)  T(F): 98.5 (10-04-21 @ 05:15), Max: 99.1 (10-03-21 @ 12:00)  HR: 97 (10-04-21 @ 05:15) (70 - 106)  BP: 118/76 (10-04-21 @ 05:15) (110/69 - 152/84)  RR: 18 (10-04-21 @ 05:15) (12 - 22)  SpO2: 98% (10-04-21 @ 05:15) (92% - 100%)            10-03 @ 07:01  -  10-04 @ 07:00  --------------------------------------------------------  IN: 630 mL / OUT: 1320 mL / NET: -690 mL    10-04 @ 07:01  -  10-04 @ 09:27  --------------------------------------------------------  IN: 240 mL / OUT: 0 mL / NET: 240 mL       Daily     Daily Weight in k (04 Oct 2021 07:39)  Admit Wt: Drug Dosing Weight  Height (cm): 152.4 (01 Oct 2021 06:56)  Weight (kg): 55.9 (01 Oct 2021 06:56)  BMI (kg/m2): 24.1 (01 Oct 2021 06:56)  BSA (m2): 1.52 (01 Oct 2021 06:56)      CAPILLARY BLOOD GLUCOSE      POCT Blood Glucose.: 151 mg/dL (04 Oct 2021 08:18)  POCT Blood Glucose.: 192 mg/dL (03 Oct 2021 21:40)  POCT Blood Glucose.: 124 mg/dL (03 Oct 2021 16:49)  POCT Blood Glucose.: 126 mg/dL (03 Oct 2021 11:00)          acetaminophen   Tablet .. 650 milliGRAM(s) Oral every 6 hours PRN  aspirin enteric coated 81 milliGRAM(s) Oral daily  atorvastatin 40 milliGRAM(s) Oral at bedtime  chlorhexidine 2% Cloths 1 Application(s) Topical <User Schedule>  clopidogrel Tablet 75 milliGRAM(s) Oral daily  enoxaparin Injectable 40 milliGRAM(s) SubCutaneous daily  ferrous    sulfate 325 milliGRAM(s) Oral daily  folic acid 1 milliGRAM(s) Oral daily  insulin glargine Injectable (LANTUS) 12 Unit(s) SubCutaneous at bedtime  insulin lispro (ADMELOG) corrective regimen sliding scale   SubCutaneous three times a day before meals  insulin lispro (ADMELOG) corrective regimen sliding scale   SubCutaneous at bedtime  losartan 50 milliGRAM(s) Oral daily  metoprolol tartrate 50 milliGRAM(s) Oral every 8 hours  oxyCODONE    IR 5 milliGRAM(s) Oral every 6 hours PRN  pantoprazole    Tablet 40 milliGRAM(s) Oral before breakfast  polyethylene glycol 3350 17 Gram(s) Oral daily  sodium chloride 0.9% lock flush 3 milliLiter(s) IV Push every 8 hours  sodium chloride 0.9%. 1000 milliLiter(s) IV Continuous <Continuous>      PHYSICAL EXAM    Subjective: "Hi.   Neurology: alert and oriented x 3, nonfocal, no gross deficits  CV : tele:  RSR  Sternal Wound :  CDI with dressing , Stable  Lungs: clear. RR easy, unlabored   Abdomen: soft, nontender, nondistended, positive bowel sounds, bowel movement   Neg N/V/D   :  pt voiding without difficulty   Extremities:   DRISCOLL; edema, neg calf tenderness.   PPP bilaterally      PW:  Chest tubes:                 VITAL SIGNS    Telemetry:  rsr    Vital Signs Last 24 Hrs  T(C): 36.9 (10-04-21 @ 05:15), Max: 37.3 (10-03-21 @ 12:00)  T(F): 98.5 (10-04-21 @ 05:15), Max: 99.1 (10-03-21 @ 12:00)  HR: 97 (10-04-21 @ 05:15) (70 - 106)  BP: 118/76 (10-04-21 @ 05:15) (110/69 - 152/84)  RR: 18 (10-04-21 @ 05:15) (12 - 22)  SpO2: 98% (10-04-21 @ 05:15) (92% - 100%)            10-03 @ 07:01  -  10-04 @ 07:00  --------------------------------------------------------  IN: 630 mL / OUT: 1320 mL / NET: -690 mL    10-04 @ 07:01  -  10-04 @ 09:27  --------------------------------------------------------  IN: 240 mL / OUT: 0 mL / NET: 240 mL       Daily     Daily Weight in k (04 Oct 2021 07:39)  Admit Wt: Drug Dosing Weight  Height (cm): 152.4 (01 Oct 2021 06:56)  Weight (kg): 55.9 (01 Oct 2021 06:56)  BMI (kg/m2): 24.1 (01 Oct 2021 06:56)  BSA (m2): 1.52 (01 Oct 2021 06:56)      CAPILLARY BLOOD GLUCOSE      POCT Blood Glucose.: 151 mg/dL (04 Oct 2021 08:18)  POCT Blood Glucose.: 192 mg/dL (03 Oct 2021 21:40)  POCT Blood Glucose.: 124 mg/dL (03 Oct 2021 16:49)  POCT Blood Glucose.: 126 mg/dL (03 Oct 2021 11:00)          acetaminophen   Tablet .. 650 milliGRAM(s) Oral every 6 hours PRN  aspirin enteric coated 81 milliGRAM(s) Oral daily  atorvastatin 40 milliGRAM(s) Oral at bedtime  chlorhexidine 2% Cloths 1 Application(s) Topical <User Schedule>  clopidogrel Tablet 75 milliGRAM(s) Oral daily  enoxaparin Injectable 40 milliGRAM(s) SubCutaneous daily  ferrous    sulfate 325 milliGRAM(s) Oral daily  folic acid 1 milliGRAM(s) Oral daily  insulin glargine Injectable (LANTUS) 12 Unit(s) SubCutaneous at bedtime  insulin lispro (ADMELOG) corrective regimen sliding scale   SubCutaneous three times a day before meals  insulin lispro (ADMELOG) corrective regimen sliding scale   SubCutaneous at bedtime  losartan 50 milliGRAM(s) Oral daily  metoprolol tartrate 50 milliGRAM(s) Oral every 8 hours  oxyCODONE    IR 5 milliGRAM(s) Oral every 6 hours PRN  pantoprazole    Tablet 40 milliGRAM(s) Oral before breakfast  polyethylene glycol 3350 17 Gram(s) Oral daily  sodium chloride 0.9% lock flush 3 milliLiter(s) IV Push every 8 hours  sodium chloride 0.9%. 1000 milliLiter(s) IV Continuous <Continuous>      PHYSICAL EXAM    Subjective: "I feel good. When can I go home?"    Neurology: alert and oriented x 3, nonfocal, no gross deficits  CV : tele:  RSR   Sternal Wound :  CDI with dressing , Stable; + pw - connected but off   Lungs: clear. RR easy, unlabored   Abdomen: soft, nontender, nondistended, positive bowel sounds, + bowel movement;  Neg N/V/D; obese abdomen   :  pt voiding without difficulty   Extremities:   DRISCOLL; neg LE edema, neg calf tenderness.   PPP bilaterally ; bilateral SVG sites cdi sangeeta     PW: + connected but off   Chest tubes: none

## 2021-10-04 NOTE — PROGRESS NOTE ADULT - SUBJECTIVE AND OBJECTIVE BOX
Chief complaint  Patient is a 71y old  Female who presents with a chief complaint of multivessel CAD (04 Oct 2021 09:26)   Review of systems  Patient in bed, looks comfortable, no hypoglycemic episodes.    Labs and Fingersticks  CAPILLARY BLOOD GLUCOSE      POCT Blood Glucose.: 176 mg/dL (04 Oct 2021 11:54)  POCT Blood Glucose.: 151 mg/dL (04 Oct 2021 08:18)  POCT Blood Glucose.: 192 mg/dL (03 Oct 2021 21:40)  POCT Blood Glucose.: 124 mg/dL (03 Oct 2021 16:49)      Anion Gap, Serum: 13 (10-04 @ 07:35)  Anion Gap, Serum: 11 (10-03 @ 00:55)      Calcium, Total Serum: 8.3 *L* (10-04 @ 07:35)  Calcium, Total Serum: 8.2 *L* (10-03 @ 00:55)  Albumin, Serum: 3.1 *L* (10-03 @ 00:55)    Alanine Aminotransferase (ALT/SGPT): 30 (10-03 @ 00:55)  Alkaline Phosphatase, Serum: 45 (10-03 @ 00:55)  Aspartate Aminotransferase (AST/SGOT): 42 *H* (10-03 @ 00:55)        10-04    138  |  101  |  19  ----------------------------<  157<H>  4.1   |  24  |  0.75    Ca    8.3<L>      04 Oct 2021 07:35  Phos  3.4     10-03  Mg     1.7     10-03    TPro  4.9<L>  /  Alb  3.1<L>  /  TBili  0.9  /  DBili  x   /  AST  42<H>  /  ALT  30  /  AlkPhos  45  10-03                        8.1    10.31 )-----------( 134      ( 04 Oct 2021 07:35 )             24.3     Medications  MEDICATIONS  (STANDING):  aspirin enteric coated 81 milliGRAM(s) Oral daily  atorvastatin 40 milliGRAM(s) Oral at bedtime  chlorhexidine 2% Cloths 1 Application(s) Topical <User Schedule>  clopidogrel Tablet 75 milliGRAM(s) Oral daily  enoxaparin Injectable 40 milliGRAM(s) SubCutaneous daily  ferrous    sulfate 325 milliGRAM(s) Oral daily  folic acid 1 milliGRAM(s) Oral daily  insulin glargine Injectable (LANTUS) 14 Unit(s) SubCutaneous at bedtime  insulin lispro (ADMELOG) corrective regimen sliding scale   SubCutaneous three times a day before meals  insulin lispro (ADMELOG) corrective regimen sliding scale   SubCutaneous at bedtime  losartan 50 milliGRAM(s) Oral daily  metoprolol tartrate 75 milliGRAM(s) Oral three times a day  pantoprazole    Tablet 40 milliGRAM(s) Oral before breakfast  polyethylene glycol 3350 17 Gram(s) Oral daily  sodium chloride 0.9% lock flush 3 milliLiter(s) IV Push every 8 hours  sodium chloride 0.9%. 1000 milliLiter(s) (10 mL/Hr) IV Continuous <Continuous>      Physical Exam  General: Patient comfortable in bed  Vital Signs Last 12 Hrs  T(F): 98.5 (10-04-21 @ 05:15), Max: 98.5 (10-04-21 @ 05:15)  HR: 89 (10-04-21 @ 11:10) (89 - 97)  BP: 110/58 (10-04-21 @ 11:10) (110/58 - 118/76)  BP(mean): --  RR: 18 (10-04-21 @ 05:15) (18 - 18)  SpO2: 98% (10-04-21 @ 05:15) (98% - 98%)  Neck: No palpable thyroid nodules.  CVS: S1S2, No murmurs  Respiratory: No wheezing, no crepitations  GI: Abdomen soft, bowel sounds positive  Musculoskeletal:  edema lower extremities.   Skin: No skin rashes, no ecchymosis    Diagnostics

## 2021-10-05 LAB
ANION GAP SERPL CALC-SCNC: 10 MMOL/L — SIGNIFICANT CHANGE UP (ref 5–17)
BUN SERPL-MCNC: 18 MG/DL — SIGNIFICANT CHANGE UP (ref 7–23)
CALCIUM SERPL-MCNC: 8.7 MG/DL — SIGNIFICANT CHANGE UP (ref 8.4–10.5)
CHLORIDE SERPL-SCNC: 101 MMOL/L — SIGNIFICANT CHANGE UP (ref 96–108)
CO2 SERPL-SCNC: 25 MMOL/L — SIGNIFICANT CHANGE UP (ref 22–31)
CREAT SERPL-MCNC: 0.78 MG/DL — SIGNIFICANT CHANGE UP (ref 0.5–1.3)
GLUCOSE BLDC GLUCOMTR-MCNC: 118 MG/DL — HIGH (ref 70–99)
GLUCOSE BLDC GLUCOMTR-MCNC: 185 MG/DL — HIGH (ref 70–99)
GLUCOSE BLDC GLUCOMTR-MCNC: 83 MG/DL — SIGNIFICANT CHANGE UP (ref 70–99)
GLUCOSE BLDC GLUCOMTR-MCNC: 89 MG/DL — SIGNIFICANT CHANGE UP (ref 70–99)
GLUCOSE SERPL-MCNC: 100 MG/DL — HIGH (ref 70–99)
HCT VFR BLD CALC: 24.3 % — LOW (ref 34.5–45)
HGB BLD-MCNC: 8.3 G/DL — LOW (ref 11.5–15.5)
MCHC RBC-ENTMCNC: 31 PG — SIGNIFICANT CHANGE UP (ref 27–34)
MCHC RBC-ENTMCNC: 34.2 GM/DL — SIGNIFICANT CHANGE UP (ref 32–36)
MCV RBC AUTO: 90.7 FL — SIGNIFICANT CHANGE UP (ref 80–100)
NRBC # BLD: 0 /100 WBCS — SIGNIFICANT CHANGE UP (ref 0–0)
PLATELET # BLD AUTO: 222 K/UL — SIGNIFICANT CHANGE UP (ref 150–400)
POTASSIUM SERPL-MCNC: 3.8 MMOL/L — SIGNIFICANT CHANGE UP (ref 3.5–5.3)
POTASSIUM SERPL-SCNC: 3.8 MMOL/L — SIGNIFICANT CHANGE UP (ref 3.5–5.3)
RBC # BLD: 2.68 M/UL — LOW (ref 3.8–5.2)
RBC # FLD: 14.2 % — SIGNIFICANT CHANGE UP (ref 10.3–14.5)
SODIUM SERPL-SCNC: 136 MMOL/L — SIGNIFICANT CHANGE UP (ref 135–145)
WBC # BLD: 9.09 K/UL — SIGNIFICANT CHANGE UP (ref 3.8–10.5)
WBC # FLD AUTO: 9.09 K/UL — SIGNIFICANT CHANGE UP (ref 3.8–10.5)

## 2021-10-05 RX ORDER — POTASSIUM CHLORIDE 20 MEQ
20 PACKET (EA) ORAL ONCE
Refills: 0 | Status: COMPLETED | OUTPATIENT
Start: 2021-10-05 | End: 2021-10-05

## 2021-10-05 RX ADMIN — CHLORHEXIDINE GLUCONATE 1 APPLICATION(S): 213 SOLUTION TOPICAL at 10:09

## 2021-10-05 RX ADMIN — OXYCODONE HYDROCHLORIDE 5 MILLIGRAM(S): 5 TABLET ORAL at 12:59

## 2021-10-05 RX ADMIN — CLOPIDOGREL BISULFATE 75 MILLIGRAM(S): 75 TABLET, FILM COATED ORAL at 11:43

## 2021-10-05 RX ADMIN — SODIUM CHLORIDE 3 MILLILITER(S): 9 INJECTION INTRAMUSCULAR; INTRAVENOUS; SUBCUTANEOUS at 22:46

## 2021-10-05 RX ADMIN — Medication 4 UNIT(S): at 12:01

## 2021-10-05 RX ADMIN — LOSARTAN POTASSIUM 50 MILLIGRAM(S): 100 TABLET, FILM COATED ORAL at 05:23

## 2021-10-05 RX ADMIN — ATORVASTATIN CALCIUM 40 MILLIGRAM(S): 80 TABLET, FILM COATED ORAL at 22:27

## 2021-10-05 RX ADMIN — Medication 81 MILLIGRAM(S): at 11:42

## 2021-10-05 RX ADMIN — Medication 75 MILLIGRAM(S): at 05:23

## 2021-10-05 RX ADMIN — ENOXAPARIN SODIUM 40 MILLIGRAM(S): 100 INJECTION SUBCUTANEOUS at 11:43

## 2021-10-05 RX ADMIN — Medication 4 UNIT(S): at 09:02

## 2021-10-05 RX ADMIN — POLYETHYLENE GLYCOL 3350 17 GRAM(S): 17 POWDER, FOR SOLUTION ORAL at 11:43

## 2021-10-05 RX ADMIN — PANTOPRAZOLE SODIUM 40 MILLIGRAM(S): 20 TABLET, DELAYED RELEASE ORAL at 05:23

## 2021-10-05 RX ADMIN — Medication 75 MILLIGRAM(S): at 22:28

## 2021-10-05 RX ADMIN — Medication 75 MILLIGRAM(S): at 13:36

## 2021-10-05 RX ADMIN — Medication 325 MILLIGRAM(S): at 11:43

## 2021-10-05 RX ADMIN — SODIUM CHLORIDE 3 MILLILITER(S): 9 INJECTION INTRAMUSCULAR; INTRAVENOUS; SUBCUTANEOUS at 15:44

## 2021-10-05 RX ADMIN — Medication 20 MILLIEQUIVALENT(S): at 09:39

## 2021-10-05 RX ADMIN — Medication 1 MILLIGRAM(S): at 11:42

## 2021-10-05 RX ADMIN — OXYCODONE HYDROCHLORIDE 5 MILLIGRAM(S): 5 TABLET ORAL at 12:30

## 2021-10-05 RX ADMIN — INSULIN GLARGINE 14 UNIT(S): 100 INJECTION, SOLUTION SUBCUTANEOUS at 22:28

## 2021-10-05 RX ADMIN — SODIUM CHLORIDE 3 MILLILITER(S): 9 INJECTION INTRAMUSCULAR; INTRAVENOUS; SUBCUTANEOUS at 05:50

## 2021-10-05 NOTE — PROGRESS NOTE ADULT - SUBJECTIVE AND OBJECTIVE BOX
Chief complaint  Patient is a 71y old  Female who presents with a chief complaint of multivessel CAD (05 Oct 2021 09:00)   Review of systems  Patient in bed, looks comfortable, no hypoglycemic episodes.    Labs and Fingersticks  CAPILLARY BLOOD GLUCOSE      POCT Blood Glucose.: 118 mg/dL (05 Oct 2021 11:53)  POCT Blood Glucose.: 89 mg/dL (05 Oct 2021 08:03)  POCT Blood Glucose.: 166 mg/dL (04 Oct 2021 21:33)  POCT Blood Glucose.: 182 mg/dL (04 Oct 2021 17:02)  POCT Blood Glucose.: 217 mg/dL (04 Oct 2021 13:32)      Anion Gap, Serum: 10 (10-05 @ 06:14)  Anion Gap, Serum: 13 (10-04 @ 07:35)      Calcium, Total Serum: 8.7 (10-05 @ 06:14)  Calcium, Total Serum: 8.3 *L* (10-04 @ 07:35)          10-05    136  |  101  |  18  ----------------------------<  100<H>  3.8   |  25  |  0.78    Ca    8.7      05 Oct 2021 06:14                          8.3    9.09  )-----------( 222      ( 05 Oct 2021 06:14 )             24.3     Medications  MEDICATIONS  (STANDING):  aspirin enteric coated 81 milliGRAM(s) Oral daily  atorvastatin 40 milliGRAM(s) Oral at bedtime  chlorhexidine 2% Cloths 1 Application(s) Topical <User Schedule>  clopidogrel Tablet 75 milliGRAM(s) Oral daily  dextrose 40% Gel 15 Gram(s) Oral once  dextrose 50% Injectable 25 Gram(s) IV Push once  dextrose 50% Injectable 12.5 Gram(s) IV Push once  dextrose 50% Injectable 25 Gram(s) IV Push once  enoxaparin Injectable 40 milliGRAM(s) SubCutaneous daily  ferrous    sulfate 325 milliGRAM(s) Oral daily  folic acid 1 milliGRAM(s) Oral daily  glucagon  Injectable 1 milliGRAM(s) IntraMuscular once  insulin glargine Injectable (LANTUS) 14 Unit(s) SubCutaneous at bedtime  insulin lispro (ADMELOG) corrective regimen sliding scale   SubCutaneous three times a day before meals  insulin lispro (ADMELOG) corrective regimen sliding scale   SubCutaneous at bedtime  insulin lispro Injectable (ADMELOG) 4 Unit(s) SubCutaneous three times a day before meals  losartan 50 milliGRAM(s) Oral daily  metoprolol tartrate 75 milliGRAM(s) Oral three times a day  pantoprazole    Tablet 40 milliGRAM(s) Oral before breakfast  polyethylene glycol 3350 17 Gram(s) Oral daily  sodium chloride 0.9% lock flush 3 milliLiter(s) IV Push every 8 hours  sodium chloride 0.9%. 1000 milliLiter(s) (10 mL/Hr) IV Continuous <Continuous>      Physical Exam  General: Patient comfortable in bed  Vital Signs Last 12 Hrs  T(F): 98.6 (10-05-21 @ 05:00), Max: 98.6 (10-05-21 @ 05:00)  HR: 85 (10-05-21 @ 05:00) (85 - 85)  BP: 122/62 (10-05-21 @ 05:00) (122/62 - 122/62)  BP(mean): --  RR: 18 (10-05-21 @ 05:00) (18 - 18)  SpO2: 100% (10-05-21 @ 05:00) (100% - 100%)  Neck: No palpable thyroid nodules.  CVS: S1S2, No murmurs  Respiratory: No wheezing, no crepitations  GI: Abdomen soft, bowel sounds positive  Musculoskeletal:  edema lower extremities.   Skin: No skin rashes, no ecchymosis    Diagnostics             Chief complaint  Patient is a 71y old  Female who presents with a chief complaint of multivessel CAD (05 Oct 2021 09:00)   Review of systems  Patient in bed, looks comfortable, no hypoglycemic episodes.    Labs and Fingersticks  CAPILLARY BLOOD GLUCOSE    POCT Blood Glucose.: 118 mg/dL (05 Oct 2021 11:53)  POCT Blood Glucose.: 89 mg/dL (05 Oct 2021 08:03)  POCT Blood Glucose.: 166 mg/dL (04 Oct 2021 21:33)  POCT Blood Glucose.: 182 mg/dL (04 Oct 2021 17:02)  POCT Blood Glucose.: 217 mg/dL (04 Oct 2021 13:32)      Anion Gap, Serum: 10 (10-05 @ 06:14)  Anion Gap, Serum: 13 (10-04 @ 07:35)      Calcium, Total Serum: 8.7 (10-05 @ 06:14)  Calcium, Total Serum: 8.3 *L* (10-04 @ 07:35)          10-05    136  |  101  |  18  ----------------------------<  100<H>  3.8   |  25  |  0.78    Ca    8.7      05 Oct 2021 06:14                          8.3    9.09  )-----------( 222      ( 05 Oct 2021 06:14 )             24.3     Medications  MEDICATIONS  (STANDING):  aspirin enteric coated 81 milliGRAM(s) Oral daily  atorvastatin 40 milliGRAM(s) Oral at bedtime  chlorhexidine 2% Cloths 1 Application(s) Topical <User Schedule>  clopidogrel Tablet 75 milliGRAM(s) Oral daily  dextrose 40% Gel 15 Gram(s) Oral once  dextrose 50% Injectable 25 Gram(s) IV Push once  dextrose 50% Injectable 12.5 Gram(s) IV Push once  dextrose 50% Injectable 25 Gram(s) IV Push once  enoxaparin Injectable 40 milliGRAM(s) SubCutaneous daily  ferrous    sulfate 325 milliGRAM(s) Oral daily  folic acid 1 milliGRAM(s) Oral daily  glucagon  Injectable 1 milliGRAM(s) IntraMuscular once  insulin glargine Injectable (LANTUS) 14 Unit(s) SubCutaneous at bedtime  insulin lispro (ADMELOG) corrective regimen sliding scale   SubCutaneous three times a day before meals  insulin lispro (ADMELOG) corrective regimen sliding scale   SubCutaneous at bedtime  insulin lispro Injectable (ADMELOG) 4 Unit(s) SubCutaneous three times a day before meals  losartan 50 milliGRAM(s) Oral daily  metoprolol tartrate 75 milliGRAM(s) Oral three times a day  pantoprazole    Tablet 40 milliGRAM(s) Oral before breakfast  polyethylene glycol 3350 17 Gram(s) Oral daily  sodium chloride 0.9% lock flush 3 milliLiter(s) IV Push every 8 hours  sodium chloride 0.9%. 1000 milliLiter(s) (10 mL/Hr) IV Continuous <Continuous>      Physical Exam  General: Patient comfortable in bed  Vital Signs Last 12 Hrs  T(F): 98.6 (10-05-21 @ 05:00), Max: 98.6 (10-05-21 @ 05:00)  HR: 85 (10-05-21 @ 05:00) (85 - 85)  BP: 122/62 (10-05-21 @ 05:00) (122/62 - 122/62)  BP(mean): --  RR: 18 (10-05-21 @ 05:00) (18 - 18)  SpO2: 100% (10-05-21 @ 05:00) (100% - 100%)  Neck: No palpable thyroid nodules.  CVS: S1S2, No murmurs  Respiratory: No wheezing, no crepitations  GI: Abdomen soft, bowel sounds positive  Musculoskeletal:  edema lower extremities.   Skin: No skin rashes, no ecchymosis    Diagnostics

## 2021-10-05 NOTE — PROGRESS NOTE ADULT - ASSESSMENT
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

## 2021-10-05 NOTE — PROGRESS NOTE ADULT - ASSESSMENT
Assessment  DMT2: 71y Female with DM T2 with hyperglycemia, A1C 7.5%, was on oral meds at home, postop on basal bolus insulin, increased dose, blood sugars are improving, no hypoglycemic episodes, eating inconsistently, planning DC tomorrow.  CAD: s/p CABG, on medications, stable, monitored.  HTN: Controlled,  on antihypertensive medications.  HLD: On statin      Noam Ng MD  Cell: [phone]  Office: [phone]     Assessment  DMT2: 71y Female with DM T2 with hyperglycemia, A1C 7.5%, was on oral meds at home, postop on basal bolus insulin, increased dose, blood sugars are improving,  no hypoglycemic episodes, eating inconsistently, planning DC tomorrow.  CAD: s/p CABG, on medications, stable, monitored.  HTN: Controlled,  on antihypertensive medications.  HLD: On statin      Noam Ng MD  Cell: [phone]  Office: [phone]

## 2021-10-05 NOTE — PROGRESS NOTE ADULT - PROBLEM SELECTOR PLAN 1
Patient eats inconsistently and has risk for hypoglycemias- Please hold standing-dose Admelog if she is not eating.  Will continue current insulin regimen for now. Will continue monitoring FS and FU.  Suggest DC on prior Janumet 50/1000 BID, discontinue glipizide given risk for hypoglycemias, endo FU 2 weeks.  Patient counseled for compliance with consistent low carb diet.

## 2021-10-05 NOTE — PROGRESS NOTE ADULT - SUBJECTIVE AND OBJECTIVE BOX
VITAL SIGNS-Telemetry:  SR 60-90  Vital Signs Last 24 Hrs  T(C): 37 (10-05-21 @ 05:00), Max: 37 (10-05-21 @ 05:00)  T(F): 98.6 (10-05-21 @ 05:00), Max: 98.6 (10-05-21 @ 05:00)  HR: 85 (10-05-21 @ 05:00) (78 - 89)  BP: 122/62 (10-05-21 @ 05:00) (102/68 - 155/90)  RR: 18 (10-05-21 @ 05:00) (18 - 18)  SpO2: 100% (10-05-21 @ 05:00) (99% - 100%)         10-04 @ 07:01  -  10-05 @ 07:00  --------------------------------------------------------  IN: 1160 mL / OUT: 1650 mL / NET: -490 mL    Daily     Daily Weight in k (05 Oct 2021 07:24)    CAPILLARY BLOOD GLUCOSE  POCT Blood Glucose.: 89 mg/dL (05 Oct 2021 08:03)  POCT Blood Glucose.: 166 mg/dL (04 Oct 2021 21:33)  POCT Blood Glucose.: 182 mg/dL (04 Oct 2021 17:02)  POCT Blood Glucose.: 217 mg/dL (04 Oct 2021 13:32)  POCT Blood Glucose.: 176 mg/dL (04 Oct 2021 11:54)     PHYSICAL EXAM:  Neurology: alert and oriented x 3, nonfocal, no gross deficits  CV : S1S2  Sternal Wound :  CDI , Stable  Lungs: cta  Abdomen: soft, nontender, nondistended, positive bowel sounds, last bowel movement   10/4      Extremities:     RLE inc cdi w/ ecchymosis - + 1 p. edema RLE - no calf tenderness    acetaminophen   Tablet .. 650 milliGRAM(s) Oral every 6 hours PRN  aspirin enteric coated 81 milliGRAM(s) Oral daily  atorvastatin 40 milliGRAM(s) Oral at bedtime  chlorhexidine 2% Cloths 1 Application(s) Topical <User Schedule>  clopidogrel Tablet 75 milliGRAM(s) Oral daily  dextrose 40% Gel 15 Gram(s) Oral once  dextrose 50% Injectable 25 Gram(s) IV Push once  dextrose 50% Injectable 12.5 Gram(s) IV Push once  dextrose 50% Injectable 25 Gram(s) IV Push once  enoxaparin Injectable 40 milliGRAM(s) SubCutaneous daily  ferrous    sulfate 325 milliGRAM(s) Oral daily  folic acid 1 milliGRAM(s) Oral daily  glucagon  Injectable 1 milliGRAM(s) IntraMuscular once  insulin glargine Injectable (LANTUS) 14 Unit(s) SubCutaneous at bedtime  insulin lispro (ADMELOG) corrective regimen sliding scale   SubCutaneous three times a day before meals  insulin lispro (ADMELOG) corrective regimen sliding scale   SubCutaneous at bedtime  insulin lispro Injectable (ADMELOG) 4 Unit(s) SubCutaneous three times a day before meals  losartan 50 milliGRAM(s) Oral daily  metoprolol tartrate 75 milliGRAM(s) Oral three times a day  oxyCODONE    IR 5 milliGRAM(s) Oral every 6 hours PRN  pantoprazole    Tablet 40 milliGRAM(s) Oral before breakfast  polyethylene glycol 3350 17 Gram(s) Oral daily  potassium chloride    Tablet ER 20 milliEquivalent(s) Oral once  sodium chloride 0.9% lock flush 3 milliLiter(s) IV Push every 8 hours  sodium chloride 0.9%. 1000 milliLiter(s) IV Continuous <Continuous>      Physical Therapy Rec:   Home  [ c ]   Home w/ PT  [  ]  Rehab  [  ]  Discussed with Cardiothoracic Team at AM rounds. VITAL SIGNS-Telemetry:  SR 60-90  Vital Signs Last 24 Hrs  T(C): 37 (10-05-21 @ 05:00), Max: 37 (10-05-21 @ 05:00)  T(F): 98.6 (10-05-21 @ 05:00), Max: 98.6 (10-05-21 @ 05:00)  HR: 85 (10-05-21 @ 05:00) (78 - 89)  BP: 122/62 (10-05-21 @ 05:00) (102/68 - 155/90)  RR: 18 (10-05-21 @ 05:00) (18 - 18)  SpO2: 100% (10-05-21 @ 05:00) (99% - 100%)         10-04 @ 07:01  -  10-05 @ 07:00  --------------------------------------------------------  IN: 1160 mL / OUT: 1650 mL / NET: -490 mL    Daily     Daily Weight in k (05 Oct 2021 07:24)    CAPILLARY BLOOD GLUCOSE  POCT Blood Glucose.: 89 mg/dL (05 Oct 2021 08:03)  POCT Blood Glucose.: 166 mg/dL (04 Oct 2021 21:33)  POCT Blood Glucose.: 182 mg/dL (04 Oct 2021 17:02)  POCT Blood Glucose.: 217 mg/dL (04 Oct 2021 13:32)  POCT Blood Glucose.: 176 mg/dL (04 Oct 2021 11:54)     PHYSICAL EXAM:  Neurology: alert and oriented x 3, nonfocal, no gross deficits  CV : S1S2  Sternal Wound :  CDI , Stable  Lungs: cta  Abdomen: soft, nontender, nondistended, positive bowel sounds, last bowel movement   10/4      Extremities:     LLE inc cdi RLE inc cdi w/ ecchymosis - + 1 p. edema RLE - no calf tenderness    acetaminophen   Tablet .. 650 milliGRAM(s) Oral every 6 hours PRN  aspirin enteric coated 81 milliGRAM(s) Oral daily  atorvastatin 40 milliGRAM(s) Oral at bedtime  chlorhexidine 2% Cloths 1 Application(s) Topical <User Schedule>  clopidogrel Tablet 75 milliGRAM(s) Oral daily  dextrose 40% Gel 15 Gram(s) Oral once  dextrose 50% Injectable 25 Gram(s) IV Push once  dextrose 50% Injectable 12.5 Gram(s) IV Push once  dextrose 50% Injectable 25 Gram(s) IV Push once  enoxaparin Injectable 40 milliGRAM(s) SubCutaneous daily  ferrous    sulfate 325 milliGRAM(s) Oral daily  folic acid 1 milliGRAM(s) Oral daily  glucagon  Injectable 1 milliGRAM(s) IntraMuscular once  insulin glargine Injectable (LANTUS) 14 Unit(s) SubCutaneous at bedtime  insulin lispro (ADMELOG) corrective regimen sliding scale   SubCutaneous three times a day before meals  insulin lispro (ADMELOG) corrective regimen sliding scale   SubCutaneous at bedtime  insulin lispro Injectable (ADMELOG) 4 Unit(s) SubCutaneous three times a day before meals  losartan 50 milliGRAM(s) Oral daily  metoprolol tartrate 75 milliGRAM(s) Oral three times a day  oxyCODONE    IR 5 milliGRAM(s) Oral every 6 hours PRN  pantoprazole    Tablet 40 milliGRAM(s) Oral before breakfast  polyethylene glycol 3350 17 Gram(s) Oral daily  potassium chloride    Tablet ER 20 milliEquivalent(s) Oral once  sodium chloride 0.9% lock flush 3 milliLiter(s) IV Push every 8 hours  sodium chloride 0.9%. 1000 milliLiter(s) IV Continuous <Continuous>      Physical Therapy Rec:   Home  [ c ]   Home w/ PT  [  ]  Rehab  [  ]  Discussed with Cardiothoracic Team at AM rounds.

## 2021-10-06 ENCOUNTER — TRANSCRIPTION ENCOUNTER (OUTPATIENT)
Age: 71
End: 2021-10-06

## 2021-10-06 VITALS — HEART RATE: 96 BPM | DIASTOLIC BLOOD PRESSURE: 76 MMHG | SYSTOLIC BLOOD PRESSURE: 134 MMHG

## 2021-10-06 LAB
ANION GAP SERPL CALC-SCNC: 9 MMOL/L — SIGNIFICANT CHANGE UP (ref 5–17)
BUN SERPL-MCNC: 17 MG/DL — SIGNIFICANT CHANGE UP (ref 7–23)
CALCIUM SERPL-MCNC: 8.6 MG/DL — SIGNIFICANT CHANGE UP (ref 8.4–10.5)
CHLORIDE SERPL-SCNC: 105 MMOL/L — SIGNIFICANT CHANGE UP (ref 96–108)
CO2 SERPL-SCNC: 25 MMOL/L — SIGNIFICANT CHANGE UP (ref 22–31)
CREAT SERPL-MCNC: 0.82 MG/DL — SIGNIFICANT CHANGE UP (ref 0.5–1.3)
GLUCOSE BLDC GLUCOMTR-MCNC: 131 MG/DL — HIGH (ref 70–99)
GLUCOSE BLDC GLUCOMTR-MCNC: 91 MG/DL — SIGNIFICANT CHANGE UP (ref 70–99)
GLUCOSE SERPL-MCNC: 138 MG/DL — HIGH (ref 70–99)
HCT VFR BLD CALC: 22 % — LOW (ref 34.5–45)
HGB BLD-MCNC: 7.1 G/DL — LOW (ref 11.5–15.5)
MCHC RBC-ENTMCNC: 30.3 PG — SIGNIFICANT CHANGE UP (ref 27–34)
MCHC RBC-ENTMCNC: 32.3 GM/DL — SIGNIFICANT CHANGE UP (ref 32–36)
MCV RBC AUTO: 94 FL — SIGNIFICANT CHANGE UP (ref 80–100)
NRBC # BLD: 0 /100 WBCS — SIGNIFICANT CHANGE UP (ref 0–0)
PLATELET # BLD AUTO: 229 K/UL — SIGNIFICANT CHANGE UP (ref 150–400)
POTASSIUM SERPL-MCNC: 4.9 MMOL/L — SIGNIFICANT CHANGE UP (ref 3.5–5.3)
POTASSIUM SERPL-SCNC: 4.9 MMOL/L — SIGNIFICANT CHANGE UP (ref 3.5–5.3)
RBC # BLD: 2.34 M/UL — LOW (ref 3.8–5.2)
RBC # FLD: 14.6 % — HIGH (ref 10.3–14.5)
SARS-COV-2 RNA SPEC QL NAA+PROBE: SIGNIFICANT CHANGE UP
SODIUM SERPL-SCNC: 139 MMOL/L — SIGNIFICANT CHANGE UP (ref 135–145)
WBC # BLD: 7.76 K/UL — SIGNIFICANT CHANGE UP (ref 3.8–10.5)
WBC # FLD AUTO: 7.76 K/UL — SIGNIFICANT CHANGE UP (ref 3.8–10.5)

## 2021-10-06 RX ORDER — ASPIRIN/CALCIUM CARB/MAGNESIUM 324 MG
1 TABLET ORAL
Qty: 0 | Refills: 0 | DISCHARGE
Start: 2021-10-06

## 2021-10-06 RX ORDER — FERROUS SULFATE 325(65) MG
1 TABLET ORAL
Qty: 60 | Refills: 0
Start: 2021-10-06

## 2021-10-06 RX ORDER — CLOPIDOGREL BISULFATE 75 MG/1
1 TABLET, FILM COATED ORAL
Qty: 30 | Refills: 0
Start: 2021-10-06

## 2021-10-06 RX ORDER — DOCUSATE SODIUM 100 MG
1 CAPSULE ORAL
Qty: 60 | Refills: 0
Start: 2021-10-06

## 2021-10-06 RX ORDER — ACETAMINOPHEN 500 MG
2 TABLET ORAL
Qty: 0 | Refills: 0 | DISCHARGE
Start: 2021-10-06

## 2021-10-06 RX ORDER — FOLIC ACID 0.8 MG
1 TABLET ORAL
Qty: 30 | Refills: 0
Start: 2021-10-06

## 2021-10-06 RX ORDER — ATORVASTATIN CALCIUM 80 MG/1
1 TABLET, FILM COATED ORAL
Qty: 30 | Refills: 0
Start: 2021-10-06

## 2021-10-06 RX ORDER — POLYETHYLENE GLYCOL 3350 17 G/17G
17 POWDER, FOR SOLUTION ORAL
Qty: 0 | Refills: 0 | DISCHARGE
Start: 2021-10-06

## 2021-10-06 RX ORDER — METOPROLOL TARTRATE 50 MG
1 TABLET ORAL
Qty: 60 | Refills: 0
Start: 2021-10-06 | End: 2021-11-04

## 2021-10-06 RX ORDER — SITAGLIPTIN AND METFORMIN HYDROCHLORIDE 500; 50 MG/1; MG/1
1 TABLET, FILM COATED ORAL
Qty: 60 | Refills: 0
Start: 2021-10-06 | End: 2021-11-04

## 2021-10-06 RX ORDER — OXYCODONE HYDROCHLORIDE 5 MG/1
1 TABLET ORAL
Qty: 20 | Refills: 0
Start: 2021-10-06

## 2021-10-06 RX ORDER — LOSARTAN POTASSIUM 100 MG/1
1 TABLET, FILM COATED ORAL
Qty: 30 | Refills: 0
Start: 2021-10-06

## 2021-10-06 RX ADMIN — Medication 4 UNIT(S): at 08:35

## 2021-10-06 RX ADMIN — Medication 325 MILLIGRAM(S): at 11:18

## 2021-10-06 RX ADMIN — Medication 81 MILLIGRAM(S): at 11:18

## 2021-10-06 RX ADMIN — SODIUM CHLORIDE 3 MILLILITER(S): 9 INJECTION INTRAMUSCULAR; INTRAVENOUS; SUBCUTANEOUS at 05:59

## 2021-10-06 RX ADMIN — Medication 650 MILLIGRAM(S): at 12:18

## 2021-10-06 RX ADMIN — Medication 4 UNIT(S): at 13:08

## 2021-10-06 RX ADMIN — Medication 75 MILLIGRAM(S): at 05:11

## 2021-10-06 RX ADMIN — POLYETHYLENE GLYCOL 3350 17 GRAM(S): 17 POWDER, FOR SOLUTION ORAL at 11:17

## 2021-10-06 RX ADMIN — LOSARTAN POTASSIUM 50 MILLIGRAM(S): 100 TABLET, FILM COATED ORAL at 05:11

## 2021-10-06 RX ADMIN — SODIUM CHLORIDE 3 MILLILITER(S): 9 INJECTION INTRAMUSCULAR; INTRAVENOUS; SUBCUTANEOUS at 13:31

## 2021-10-06 RX ADMIN — Medication 650 MILLIGRAM(S): at 12:30

## 2021-10-06 RX ADMIN — CLOPIDOGREL BISULFATE 75 MILLIGRAM(S): 75 TABLET, FILM COATED ORAL at 11:17

## 2021-10-06 RX ADMIN — PANTOPRAZOLE SODIUM 40 MILLIGRAM(S): 20 TABLET, DELAYED RELEASE ORAL at 05:11

## 2021-10-06 RX ADMIN — Medication 1 MILLIGRAM(S): at 11:17

## 2021-10-06 RX ADMIN — Medication 75 MILLIGRAM(S): at 14:19

## 2021-10-06 RX ADMIN — CHLORHEXIDINE GLUCONATE 1 APPLICATION(S): 213 SOLUTION TOPICAL at 11:07

## 2021-10-06 RX ADMIN — ENOXAPARIN SODIUM 40 MILLIGRAM(S): 100 INJECTION SUBCUTANEOUS at 11:18

## 2021-10-06 NOTE — PROGRESS NOTE ADULT - PROBLEM SELECTOR PLAN 3
Suggest to continue medications, monitoring, FU primary team recommendations.
ARB/ACEi on hold  Pt refusing Amlodipine for BP control, states she gets palpitations  Started on hydralazine 10 q8h
Suggest to continue medications, monitoring, FU primary team recommendations.
ARB/ACEi on hold  Pt refusing Amlodipine for BP control, states she gets palpitations  Started on hydralazine 10 q8h
Suggest to continue medications, monitoring, FU primary team recommendations.

## 2021-10-06 NOTE — PROGRESS NOTE ADULT - PROBLEM SELECTOR PLAN 4
Continue atorvastatin 40 HS
Will continue statin, primary team FU.
Continue atorvastatin 40 HS

## 2021-10-06 NOTE — DISCHARGE NOTE NURSING/CASE MANAGEMENT/SOCIAL WORK - NSDCFUADDAPPT_GEN_ALL_CORE_FT
KANU Edmond Tuesday,  October 12th at 8:45 .  Call [phone] with questions or concerns  KANU Ng ( endocrinologist ) or your own endocrinologist within 2 weeks.  KANU Stein ( cardiologist ) within 1-2weeks

## 2021-10-06 NOTE — DISCHARGE NOTE PROVIDER - PROVIDER TOKENS
PROVIDER:[TOKEN:[[id number]]],PROVIDER:[TOKEN:[[id number]]],PROVIDER:[TOKEN:[[id number]]]

## 2021-10-06 NOTE — PROGRESS NOTE ADULT - PROBLEM SELECTOR PROBLEM 2
CAD (coronary artery disease)
Type 2 diabetes mellitus
S/P CABG x 3
S/P CABG x 3
CAD (coronary artery disease)
Type 2 diabetes mellitus
Type 2 diabetes mellitus
CAD (coronary artery disease)
CAD (coronary artery disease)

## 2021-10-06 NOTE — DISCHARGE NOTE PROVIDER - NSDCMRMEDTOKEN_GEN_ALL_CORE_FT
acetaminophen 325 mg oral tablet: 2 tab(s) orally every 6 hours, As needed, Mild Pain (1 - 3)  aspirin 81 mg oral delayed release tablet: 1 tab(s) orally once a day  atorvastatin 40 mg oral tablet: 1 tab(s) orally once a day (at bedtime)  clopidogrel 75 mg oral tablet: 1 tab(s) orally once a day  docusate sodium 100 mg oral capsule: 1 cap(s) orally 2 times a day   ferrous sulfate 325 mg (65 mg elemental iron) oral tablet: 1 tab(s) orally 2 times a day   folic acid 1 mg oral tablet: 1 tab(s) orally once a day  losartan 100 mg oral tablet: 1 tab(s) orally once a day  metoprolol tartrate 100 mg oral tablet: 1 tab(s) orally 2 times a day  oxyCODONE 5 mg oral tablet: 1 tab(s) orally every 6 hours, As needed, Severe Pain (7 - 10) MDD:4  polyethylene glycol 3350 oral powder for reconstitution: 17 gram(s) orally once a day   acetaminophen 325 mg oral tablet: 2 tab(s) orally every 6 hours, As needed, Mild Pain (1 - 3)  aspirin 81 mg oral delayed release tablet: 1 tab(s) orally once a day  atorvastatin 40 mg oral tablet: 1 tab(s) orally once a day (at bedtime)  clopidogrel 75 mg oral tablet: 1 tab(s) orally once a day  docusate sodium 100 mg oral capsule: 1 cap(s) orally 2 times a day   doxycycline hyclate 100 mg oral tablet: 1 tab(s) orally 2 times a day   ferrous sulfate 325 mg (65 mg elemental iron) oral tablet: 1 tab(s) orally 2 times a day   folic acid 1 mg oral tablet: 1 tab(s) orally once a day  losartan 100 mg oral tablet: 1 tab(s) orally once a day  metoprolol tartrate 100 mg oral tablet: 1 tab(s) orally 2 times a day  oxyCODONE 5 mg oral tablet: 1 tab(s) orally every 6 hours, As needed, Severe Pain (7 - 10) MDD:4  polyethylene glycol 3350 oral powder for reconstitution: 17 gram(s) orally once a day   acetaminophen 325 mg oral tablet: 2 tab(s) orally every 6 hours, As needed, Mild Pain (1 - 3)  aspirin 81 mg oral delayed release tablet: 1 tab(s) orally once a day  atorvastatin 40 mg oral tablet: 1 tab(s) orally once a day (at bedtime)  clopidogrel 75 mg oral tablet: 1 tab(s) orally once a day  docusate sodium 100 mg oral capsule: 1 cap(s) orally 2 times a day   doxycycline hyclate 100 mg oral tablet: 1 tab(s) orally 2 times a day   ferrous sulfate 325 mg (65 mg elemental iron) oral tablet: 1 tab(s) orally 2 times a day   folic acid 1 mg oral tablet: 1 tab(s) orally once a day  glipiZIDE 2.5 mg oral tablet, extended release: 1 tab(s) orally once a day   Janumet 50 mg-1000 mg oral tablet: 1 tab(s) orally 2 times a day   losartan 100 mg oral tablet: 1 tab(s) orally once a day  metoprolol tartrate 100 mg oral tablet: 1 tab(s) orally 2 times a day  oxyCODONE 5 mg oral tablet: 1 tab(s) orally every 6 hours, As needed, Severe Pain (7 - 10) MDD:4  polyethylene glycol 3350 oral powder for reconstitution: 17 gram(s) orally once a day

## 2021-10-06 NOTE — PROGRESS NOTE ADULT - PROBLEM SELECTOR PLAN 1
Patient eats inconsistently and has risk for hypoglycemias- Please hold standing-dose Admelog if she is not eating.  Will continue current insulin regimen for now. Will continue monitoring FS and FU.  Suggest DC on the following DM regimen:  -Continue prior Janumet 50/1000 BID  -Add Jardiance 10mg daily  -Discontinue glipizide given risk for hypoglycemias  -endo FU 2 weeks.  Patient counseled for compliance with consistent low carb diet.

## 2021-10-06 NOTE — DISCHARGE NOTE PROVIDER - NSDCFUSCHEDAPPT_GEN_ALL_CORE_FT
TENZIN CARMEN ; 12/15/2021 ; [facility] [address] TENZIN CARMEN ; 10/12/2021 ; [facility] [address] TENZIN Sahu ; 12/15/2021 ; [facility] [address]

## 2021-10-06 NOTE — DISCHARGE NOTE PROVIDER - HOSPITAL COURSE
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  ACE wrap b/l  lower extremities and doxycycline added for R thigh incision erythema

## 2021-10-06 NOTE — DISCHARGE NOTE PROVIDER - CARE PROVIDERS DIRECT ADDRESSES
[email or web address]

## 2021-10-06 NOTE — PROGRESS NOTE ADULT - SUBJECTIVE AND OBJECTIVE BOX
Chief complaint  Patient is a 71y old  Female who presents with a chief complaint of multivessel CAD (06 Oct 2021 12:13)   Review of systems  Patient in bed, looks comfortable, no hypoglycemic episodes.    Labs and Fingersticks  CAPILLARY BLOOD GLUCOSE      POCT Blood Glucose.: 91 mg/dL (06 Oct 2021 12:42)  POCT Blood Glucose.: 131 mg/dL (06 Oct 2021 08:20)  POCT Blood Glucose.: 185 mg/dL (05 Oct 2021 22:13)  POCT Blood Glucose.: 83 mg/dL (05 Oct 2021 16:58)      Anion Gap, Serum: 9 (10-06 @ 06:30)  Anion Gap, Serum: 10 (10-05 @ 06:14)      Calcium, Total Serum: 8.6 (10-06 @ 06:30)  Calcium, Total Serum: 8.7 (10-05 @ 06:14)          10-06    139  |  105  |  17  ----------------------------<  138<H>  4.9   |  25  |  0.82    Ca    8.6      06 Oct 2021 06:30                          7.1    7.76  )-----------( 229      ( 06 Oct 2021 06:30 )             22.0     Medications  MEDICATIONS  (STANDING):  aspirin enteric coated 81 milliGRAM(s) Oral daily  atorvastatin 40 milliGRAM(s) Oral at bedtime  chlorhexidine 2% Cloths 1 Application(s) Topical <User Schedule>  clopidogrel Tablet 75 milliGRAM(s) Oral daily  dextrose 40% Gel 15 Gram(s) Oral once  dextrose 50% Injectable 25 Gram(s) IV Push once  dextrose 50% Injectable 12.5 Gram(s) IV Push once  dextrose 50% Injectable 25 Gram(s) IV Push once  enoxaparin Injectable 40 milliGRAM(s) SubCutaneous daily  ferrous    sulfate 325 milliGRAM(s) Oral daily  folic acid 1 milliGRAM(s) Oral daily  glucagon  Injectable 1 milliGRAM(s) IntraMuscular once  insulin glargine Injectable (LANTUS) 14 Unit(s) SubCutaneous at bedtime  insulin lispro (ADMELOG) corrective regimen sliding scale   SubCutaneous three times a day before meals  insulin lispro (ADMELOG) corrective regimen sliding scale   SubCutaneous at bedtime  insulin lispro Injectable (ADMELOG) 4 Unit(s) SubCutaneous three times a day before meals  losartan 50 milliGRAM(s) Oral daily  metoprolol tartrate 75 milliGRAM(s) Oral three times a day  pantoprazole    Tablet 40 milliGRAM(s) Oral before breakfast  polyethylene glycol 3350 17 Gram(s) Oral daily  sodium chloride 0.9% lock flush 3 milliLiter(s) IV Push every 8 hours  sodium chloride 0.9%. 1000 milliLiter(s) (10 mL/Hr) IV Continuous <Continuous>      Physical Exam  General: Patient comfortable in bed  Vital Signs Last 12 Hrs  T(F): 98 (10-06-21 @ 04:45), Max: 98 (10-06-21 @ 04:45)  HR: 85 (10-06-21 @ 04:45) (85 - 85)  BP: 147/77 (10-06-21 @ 04:45) (147/77 - 147/77)  BP(mean): --  RR: 18 (10-06-21 @ 04:45) (18 - 18)  SpO2: 99% (10-06-21 @ 04:45) (99% - 99%)  Neck: No palpable thyroid nodules.  CVS: S1S2, No murmurs  Respiratory: No wheezing, no crepitations  GI: Abdomen soft, bowel sounds positive  Musculoskeletal:  edema lower extremities.   Skin: No skin rashes, no ecchymosis    Diagnostics             Chief complaint  Patient is a 71y old  Female who presents with a chief complaint of multivessel CAD (06 Oct 2021 12:13)   Review of systems  Patient in bed, looks comfortable, no hypoglycemic episodes.    Labs and Fingersticks  CAPILLARY BLOOD GLUCOSE      POCT Blood Glucose.: 91 mg/dL (06 Oct 2021 12:42)  POCT Blood Glucose.: 131 mg/dL (06 Oct 2021 08:20)  POCT Blood Glucose.: 185 mg/dL (05 Oct 2021 22:13)  POCT Blood Glucose.: 83 mg/dL (05 Oct 2021 16:58)      Anion Gap, Serum: 9 (10-06 @ 06:30)  Anion Gap, Serum: 10 (10-05 @ 06:14)      Calcium, Total Serum: 8.6 (10-06 @ 06:30)  Calcium, Total Serum: 8.7 (10-05 @ 06:14)          10-06    139  |  105  |  17  ----------------------------<  138<H>  4.9   |  25  |  0.82    Ca    8.6      06 Oct 2021 06:30                          7.1    7.76  )-----------( 229      ( 06 Oct 2021 06:30 )             22.0     Medications  MEDICATIONS  (STANDING):  aspirin enteric coated 81 milliGRAM(s) Oral daily  atorvastatin 40 milliGRAM(s) Oral at bedtime  chlorhexidine 2% Cloths 1 Application(s) Topical <User Schedule>  clopidogrel Tablet 75 milliGRAM(s) Oral daily  dextrose 40% Gel 15 Gram(s) Oral once  dextrose 50% Injectable 25 Gram(s) IV Push once  dextrose 50% Injectable 12.5 Gram(s) IV Push once  dextrose 50% Injectable 25 Gram(s) IV Push once  enoxaparin Injectable 40 milliGRAM(s) SubCutaneous daily  ferrous    sulfate 325 milliGRAM(s) Oral daily  folic acid 1 milliGRAM(s) Oral daily  glucagon  Injectable 1 milliGRAM(s) IntraMuscular once  insulin glargine Injectable (LANTUS) 14 Unit(s) SubCutaneous at bedtime  insulin lispro (ADMELOG) corrective regimen sliding scale   SubCutaneous three times a day before meals  insulin lispro (ADMELOG) corrective regimen sliding scale   SubCutaneous at bedtime  insulin lispro Injectable (ADMELOG) 4 Unit(s) SubCutaneous three times a day before meals  losartan 50 milliGRAM(s) Oral daily  metoprolol tartrate 75 milliGRAM(s) Oral three times a day  pantoprazole    Tablet 40 milliGRAM(s) Oral before breakfast  polyethylene glycol 3350 17 Gram(s) Oral daily  sodium chloride 0.9% lock flush 3 milliLiter(s) IV Push every 8 hours  sodium chloride 0.9%. 1000 milliLiter(s) (10 mL/Hr) IV Continuous <Continuous>      Physical Exam  General: Patient comfortable in bed  Vital Signs Last 12 Hrs  T(F): 98 (10-06-21 @ 04:45), Max: 98 (10-06-21 @ 04:45)  HR: 85 (10-06-21 @ 04:45) (85 - 85)  BP: 147/77 (10-06-21 @ 04:45) (147/77 - 147/77)  BP(mean): --  RR: 18 (10-06-21 @ 04:45) (18 - 18)  SpO2: 99% (10-06-21 @ 04:45) (99% - 99%)  Neck: No palpable thyroid nodules.  CVS: S1S2, No murmurs  Respiratory: No wheezing, no crepitations  GI: Abdomen soft, bowel sounds positive  Musculoskeletal:  edema lower extremities.   Skin: No skin rashes, no ecchymosis    Diagnostics

## 2021-10-06 NOTE — PROGRESS NOTE ADULT - REASON FOR ADMISSION
multivessel CAD
multivessel CAD  sp  CABG
multivessel CAD   sp CABG
multivessel CAD

## 2021-10-06 NOTE — PROGRESS NOTE ADULT - PROVIDER SPECIALTY LIST ADULT
CT Surgery
Critical Care
Critical Care
CT Surgery
Critical Care
Endocrinology
CT Surgery
Endocrinology
CT Surgery

## 2021-10-06 NOTE — DISCHARGE NOTE PROVIDER - NSDCPNSUBOBJ_GEN_ALL_CORE
General: WN/WD NAD  Neurology: A&Ox3, nonfocal, DRISCOLL x 4  Respiratory: CTA B/L  CV: RRR, S1S2, no murmur  Abdominal: Soft, NT, ND no palpable mass  MSK: No edema, + peripheral pulses, FROM all 4 extremity  Incisions: sternum stable.  no drainage  right thigh incision distal serous drainage small blister + edema  left leg + edema    Vital Signs Last 24 Hrs  T(C): 36.7 (06 Oct 2021 04:45), Max: 37 (05 Oct 2021 13:22)  T(F): 98 (06 Oct 2021 04:45), Max: 98.6 (05 Oct 2021 13:22)  HR: 85 (06 Oct 2021 04:45) (75 - 85)  BP: 147/77 (06 Oct 2021 04:45) (114/72 - 147/77)  BP(mean): 93 (05 Oct 2021 13:22) (93 - 93)  RR: 18 (06 Oct 2021 04:45) (18 - 18)  SpO2: 99% (06 Oct 2021 04:45) (98% - 99%)

## 2021-10-06 NOTE — PROGRESS NOTE ADULT - PROBLEM SELECTOR PROBLEM 1
CAD (coronary artery disease)
Type 2 diabetes mellitus
CAD (coronary artery disease)
Type 2 diabetes mellitus
CAD (coronary artery disease)

## 2021-10-06 NOTE — DISCHARGE NOTE PROVIDER - NSDCHHCONTACT_GEN_ALL_CORE_FT
Ordered cough syrup. Do I need to order the spacer or won't the pharmacy just give this to her?
Spoke to pharmacy and they needs a prescription.  It can be the areo chamber which is the spacer sig could be use as directed
As certified below, I, or a nurse practitioner or physician assistant working with me, had a face-to-face encounter that meets the physician face-to-face encounter requirements.

## 2021-10-06 NOTE — DISCHARGE NOTE PROVIDER - CARE PROVIDER_API CALL
Eric Edmond)  Thoracic and Cardiac Surgery  [address] [place] [address]  Phone: [phone]  Fax: [phone]  Follow Up Time:     Noam Ng)  EndocrinologyMetabDiabetes; Internal Medicine  [address] [place] [address]  Phone: [phone]  Fax: [phone]  Follow Up Time:     Izabel Stein)  Cardiology; Internal Medicine  [address] [place] [address], [address]  [place], [place] [id number]  Phone: [phone]  Fax: [phone]  Follow Up Time:

## 2021-10-06 NOTE — DISCHARGE NOTE PROVIDER - NSDCFUADDAPPT_GEN_ALL_CORE_FT
KANU Edmond Tuesday,  October 12th at                .   Call [phone] with questions or concerns  KANU Ng ( endocrinologist ) or your own endocrinologist within 2 weeks.  KANU Stein ( cardiologist ) within 1-2weeks KANU Edmond Tuesday,  October 12th at 8:45 .  Call [phone] with questions or concerns  KANU Ng ( endocrinologist ) or your own endocrinologist within 2 weeks.  KANU Stein ( cardiologist ) within 1-2weeks

## 2021-10-06 NOTE — PROGRESS NOTE ADULT - ASSESSMENT
Assessment  DMT2: 71y Female with DM T2 with hyperglycemia, A1C 7.5%, was on oral meds at home, postop on basal bolus insulin, bolus dose intermittently held 2/2 patient not eating, blood sugars are improving and trending within overall acceptable range, no hypoglycemic episodes, eating inconsistently, planning DC.  CAD: s/p CABG, on medications, stable, monitored.  HTN: Controlled,  on antihypertensive medications.  HLD: On statin      Noam Ng MD  Cell: [phone]  Office: [phone]     Assessment  DMT2: 71y Female with DM T2 with hyperglycemia, A1C 7.5%, was on oral meds at home, postop on basal bolus insulin, bolus dose intermittently held 2/2 patient not eating,  blood sugars are improving and trending within overall acceptable range, no hypoglycemic episodes, eating inconsistently, planning DC.  CAD: s/p CABG, on medications, stable, monitored.  HTN: Controlled,  on antihypertensive medications.  HLD: On statin      Noam Ng MD  Cell: [phone]  Office: [phone]

## 2021-10-06 NOTE — DISCHARGE NOTE PROVIDER - NSDCCPCAREPLAN_GEN_ALL_CORE_FT
PRINCIPAL DISCHARGE DIAGNOSIS  Diagnosis: S/P CABG x 3  Assessment and Plan of Treatment: take meds as prescribed  ambulate at home at least 4x daily  use cough pillow and incentive spirometry  ace wraps legs during the day and remove at bedtime  weigh yourself daily and report weight groin > 2.5 lbs in 24 hrs

## 2021-10-06 NOTE — DISCHARGE NOTE NURSING/CASE MANAGEMENT/SOCIAL WORK - PATIENT PORTAL LINK FT
You can access the FollowMyHealth Patient Portal offered by [facility] by registering at the following website: [email or web address]. By joining [facility]’s FollowMyHealth portal, you will also be able to view your health information using other applications (apps) compatible with our system.
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
## 2021-10-07 ENCOUNTER — NON-APPOINTMENT (OUTPATIENT)
Age: 71
End: 2021-10-07

## 2021-10-07 RX ORDER — DOXYCYCLINE HYCLATE 100 MG/1
100 TABLET ORAL
Refills: 0 | Status: ACTIVE | COMMUNITY

## 2021-10-08 ENCOUNTER — APPOINTMENT (OUTPATIENT)
Dept: CARE COORDINATION | Facility: HOME HEALTH | Age: 71
End: 2021-10-08
Payer: MEDICARE

## 2021-10-08 VITALS
OXYGEN SATURATION: 98 % | DIASTOLIC BLOOD PRESSURE: 80 MMHG | RESPIRATION RATE: 16 BRPM | HEART RATE: 75 BPM | SYSTOLIC BLOOD PRESSURE: 148 MMHG

## 2021-10-08 PROCEDURE — 99024 POSTOP FOLLOW-UP VISIT: CPT
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
## 2021-10-11 DIAGNOSIS — R11.0 NAUSEA: ICD-10-CM

## 2021-10-12 ENCOUNTER — APPOINTMENT (OUTPATIENT)
Dept: CARDIOTHORACIC SURGERY | Facility: CLINIC | Age: 71
End: 2021-10-12
Payer: MEDICARE

## 2021-10-12 VITALS
WEIGHT: 125 LBS | BODY MASS INDEX: 24.54 KG/M2 | DIASTOLIC BLOOD PRESSURE: 72 MMHG | SYSTOLIC BLOOD PRESSURE: 154 MMHG | HEART RATE: 82 BPM | RESPIRATION RATE: 14 BRPM | OXYGEN SATURATION: 98 % | HEIGHT: 60 IN | TEMPERATURE: 98 F

## 2021-10-12 DIAGNOSIS — N36.41 HYPERMOBILITY OF URETHRA: ICD-10-CM

## 2021-10-12 PROCEDURE — 99024 POSTOP FOLLOW-UP VISIT: CPT

## 2021-10-12 RX ORDER — OXYCODONE 5 MG/1
5 TABLET ORAL
Refills: 0 | Status: COMPLETED | COMMUNITY
End: 2021-10-12

## 2021-10-12 RX ORDER — LORATADINE 10 MG
TABLET,DISINTEGRATING ORAL
Refills: 0 | Status: COMPLETED | COMMUNITY
End: 2021-10-12

## 2021-10-12 RX ORDER — DOCUSATE SODIUM 100 MG/1
100 CAPSULE, LIQUID FILLED ORAL
Qty: 60 | Refills: 0 | Status: COMPLETED | COMMUNITY
Start: 2021-10-06 | End: 2021-10-12

## 2021-10-18 ENCOUNTER — TRANSCRIPTION ENCOUNTER (OUTPATIENT)
Age: 71
End: 2021-10-18

## 2021-10-20 ENCOUNTER — APPOINTMENT (OUTPATIENT)
Dept: CARDIOLOGY | Facility: CLINIC | Age: 71
End: 2021-10-20
Payer: MEDICARE

## 2021-10-20 VITALS
SYSTOLIC BLOOD PRESSURE: 174 MMHG | WEIGHT: 125 LBS | DIASTOLIC BLOOD PRESSURE: 86 MMHG | RESPIRATION RATE: 14 BRPM | OXYGEN SATURATION: 98 % | HEIGHT: 60 IN | TEMPERATURE: 98 F | BODY MASS INDEX: 24.54 KG/M2

## 2021-10-20 PROCEDURE — 99215 OFFICE O/P EST HI 40 MIN: CPT | Mod: 25

## 2021-10-20 PROCEDURE — 99205 OFFICE O/P NEW HI 60 MIN: CPT

## 2021-10-20 PROCEDURE — 93000 ELECTROCARDIOGRAM COMPLETE: CPT

## 2021-10-22 ENCOUNTER — NON-APPOINTMENT (OUTPATIENT)
Age: 71
End: 2021-10-22

## 2021-10-22 NOTE — DISCUSSION/SUMMARY
[FreeTextEntry1] : 1 CAD, no angina at this time, continue with current medical therapy she is on aspirin and clopidogrel as she had NSTEMI, we will continue this for 12 months.  Aggressive risk factor modification.\par \par 2.  Mild segmental LV dysfunction on echocardiogram but overall ejection fraction is normal with clinically no evidence of congestive heart failure.  We will continue with current medical therapy\par \par 3.  Hypertension her blood pressure is significantly elevated, in discussion with the patient and her daughter I recommend we restart her hydrochlorothiazide.  She will monitor her blood pressure at home and report to us in 1 week.\par \par 4.  Aortic valve disease, at this time aortic valve disease mild to moderate, clinical follow-up, echocardiogram in 2 to 3 years.\par \par Additionally I spent extensive amount of time reviewing all the hospital records and reviewed this information with the patient and her daughter, we will follow up clinically and at that time determine need for further testing.

## 2021-10-22 NOTE — REVIEW OF SYSTEMS
[Fever] : no fever [Feeling Fatigued] : feeling fatigued [Blurry Vision] : no blurred vision [Earache] : no earache [Sore Throat] : no sore throat [SOB] : shortness of breath [Dyspnea on exertion] : dyspnea during exertion [Leg Claudication] : no intermittent leg claudication [Syncope] : no syncope [Cough] : no cough [Change in Appetite] : no change in appetite [Dysuria] : no dysuria

## 2021-10-22 NOTE — HISTORY OF PRESENT ILLNESS
[FreeTextEntry1] : With history of hypertension, type 2 diabetes, recent NSTEMI, severe triple-vessel disease underwent CABG with Dr. Edmond.  Ejection fraction is normal.  Clinically she is doing much better she has mild tenderness and pain along the sternal wound site but overall feels that she is making good recovery.  Daughter is a nurse practitioner at [employer] and she has been monitoring her mom's blood pressure at home and the blood pressure is in the range of 1 50-1 60 systolic.  They also state that her blood pressure was well controlled while she was taking hydrochlorothiazide prior to surgery and currently she is not taking this.

## 2021-10-22 NOTE — PHYSICAL EXAM
[Well Developed] : well developed [Normal Conjunctiva] : normal conjunctiva [Normal Venous Pressure] : normal venous pressure [Normal Gait] : normal gait [No Edema] : no edema [No Rash] : no rash [Moves all extremities] : moves all extremities [de-identified] : 2 out of 6 ejection systolic murmur at the aortic area. [de-identified] : Lungs are clear chest wound seems to be healing well

## 2021-10-26 PROCEDURE — 87086 URINE CULTURE/COLONY COUNT: CPT

## 2021-10-26 PROCEDURE — P9012: CPT

## 2021-10-26 PROCEDURE — U0003: CPT

## 2021-10-26 PROCEDURE — 86900 BLOOD TYPING SEROLOGIC ABO: CPT

## 2021-10-26 PROCEDURE — 84436 ASSAY OF TOTAL THYROXINE: CPT

## 2021-10-26 PROCEDURE — 84480 ASSAY TRIIODOTHYRONINE (T3): CPT

## 2021-10-26 PROCEDURE — 86901 BLOOD TYPING SEROLOGIC RH(D): CPT

## 2021-10-26 PROCEDURE — 71045 X-RAY EXAM CHEST 1 VIEW: CPT

## 2021-10-26 PROCEDURE — C1889: CPT

## 2021-10-26 PROCEDURE — 85730 THROMBOPLASTIN TIME PARTIAL: CPT

## 2021-10-26 PROCEDURE — 85014 HEMATOCRIT: CPT

## 2021-10-26 PROCEDURE — P9016: CPT

## 2021-10-26 PROCEDURE — 84132 ASSAY OF SERUM POTASSIUM: CPT

## 2021-10-26 PROCEDURE — 36415 COLL VENOUS BLD VENIPUNCTURE: CPT

## 2021-10-26 PROCEDURE — 82803 BLOOD GASES ANY COMBINATION: CPT

## 2021-10-26 PROCEDURE — 85384 FIBRINOGEN ACTIVITY: CPT

## 2021-10-26 PROCEDURE — 84484 ASSAY OF TROPONIN QUANT: CPT

## 2021-10-26 PROCEDURE — 83880 ASSAY OF NATRIURETIC PEPTIDE: CPT

## 2021-10-26 PROCEDURE — C1730: CPT

## 2021-10-26 PROCEDURE — 80053 COMPREHEN METABOLIC PANEL: CPT

## 2021-10-26 PROCEDURE — 85610 PROTHROMBIN TIME: CPT

## 2021-10-26 PROCEDURE — 82553 CREATINE MB FRACTION: CPT

## 2021-10-26 PROCEDURE — P9059: CPT

## 2021-10-26 PROCEDURE — 81599 UNLISTED MAAA: CPT

## 2021-10-26 PROCEDURE — C1751: CPT

## 2021-10-26 PROCEDURE — 80048 BASIC METABOLIC PNL TOTAL CA: CPT

## 2021-10-26 PROCEDURE — 84295 ASSAY OF SERUM SODIUM: CPT

## 2021-10-26 PROCEDURE — 85018 HEMOGLOBIN: CPT

## 2021-10-26 PROCEDURE — 85027 COMPLETE CBC AUTOMATED: CPT

## 2021-10-26 PROCEDURE — 36430 TRANSFUSION BLD/BLD COMPNT: CPT

## 2021-10-26 PROCEDURE — 87641 MR-STAPH DNA AMP PROBE: CPT

## 2021-10-26 PROCEDURE — 94002 VENT MGMT INPAT INIT DAY: CPT

## 2021-10-26 PROCEDURE — 83605 ASSAY OF LACTIC ACID: CPT

## 2021-10-26 PROCEDURE — 82947 ASSAY GLUCOSE BLOOD QUANT: CPT

## 2021-10-26 PROCEDURE — 93880 EXTRACRANIAL BILAT STUDY: CPT

## 2021-10-26 PROCEDURE — 82962 GLUCOSE BLOOD TEST: CPT

## 2021-10-26 PROCEDURE — P9047: CPT

## 2021-10-26 PROCEDURE — 83735 ASSAY OF MAGNESIUM: CPT

## 2021-10-26 PROCEDURE — 86891 AUTOLOGOUS BLOOD OP SALVAGE: CPT

## 2021-10-26 PROCEDURE — 82435 ASSAY OF BLOOD CHLORIDE: CPT

## 2021-10-26 PROCEDURE — 93306 TTE W/DOPPLER COMPLETE: CPT

## 2021-10-26 PROCEDURE — 31720 CLEARANCE OF AIRWAYS: CPT

## 2021-10-26 PROCEDURE — C1769: CPT

## 2021-10-26 PROCEDURE — 86923 COMPATIBILITY TEST ELECTRIC: CPT

## 2021-10-26 PROCEDURE — 94010 BREATHING CAPACITY TEST: CPT

## 2021-10-26 PROCEDURE — 82330 ASSAY OF CALCIUM: CPT

## 2021-10-26 PROCEDURE — 86965 POOLING BLOOD PLATELETS: CPT

## 2021-10-26 PROCEDURE — P9045: CPT

## 2021-10-26 PROCEDURE — C1729: CPT

## 2021-10-26 PROCEDURE — 85025 COMPLETE CBC W/AUTO DIFF WBC: CPT

## 2021-10-26 PROCEDURE — U0005: CPT

## 2021-10-26 PROCEDURE — 83036 HEMOGLOBIN GLYCOSYLATED A1C: CPT

## 2021-10-26 PROCEDURE — 97162 PT EVAL MOD COMPLEX 30 MIN: CPT

## 2021-10-26 PROCEDURE — 84100 ASSAY OF PHOSPHORUS: CPT

## 2021-10-26 PROCEDURE — 84443 ASSAY THYROID STIM HORMONE: CPT

## 2021-10-26 PROCEDURE — 86850 RBC ANTIBODY SCREEN: CPT

## 2021-10-26 PROCEDURE — 82565 ASSAY OF CREATININE: CPT

## 2021-10-26 PROCEDURE — 87640 STAPH A DNA AMP PROBE: CPT

## 2021-10-26 PROCEDURE — 93005 ELECTROCARDIOGRAM TRACING: CPT

## 2021-10-26 PROCEDURE — 82550 ASSAY OF CK (CPK): CPT

## 2021-10-26 PROCEDURE — 97116 GAIT TRAINING THERAPY: CPT

## 2021-10-26 PROCEDURE — 81001 URINALYSIS AUTO W/SCOPE: CPT

## 2021-10-27 ENCOUNTER — APPOINTMENT (OUTPATIENT)
Dept: CARDIOLOGY | Facility: CLINIC | Age: 71
End: 2021-10-27

## 2021-11-04 ENCOUNTER — TRANSCRIPTION ENCOUNTER (OUTPATIENT)
Age: 71
End: 2021-11-04

## 2021-11-09 ENCOUNTER — APPOINTMENT (OUTPATIENT)
Dept: CARDIOTHORACIC SURGERY | Facility: CLINIC | Age: 71
End: 2021-11-09
Payer: MEDICARE

## 2021-11-09 VITALS
HEIGHT: 60 IN | HEART RATE: 73 BPM | DIASTOLIC BLOOD PRESSURE: 83 MMHG | SYSTOLIC BLOOD PRESSURE: 173 MMHG | WEIGHT: 117.5 LBS | BODY MASS INDEX: 23.07 KG/M2 | RESPIRATION RATE: 14 BRPM | OXYGEN SATURATION: 98 %

## 2021-11-09 VITALS
DIASTOLIC BLOOD PRESSURE: 76 MMHG | RESPIRATION RATE: 14 BRPM | OXYGEN SATURATION: 98 % | TEMPERATURE: 98 F | SYSTOLIC BLOOD PRESSURE: 148 MMHG

## 2021-11-09 PROCEDURE — 99024 POSTOP FOLLOW-UP VISIT: CPT

## 2021-11-09 RX ORDER — FERROUS SULFATE TAB 325 MG (65 MG ELEMENTAL FE) 325 (65 FE) MG
325 (65 FE) TAB ORAL
Qty: 60 | Refills: 0 | Status: DISCONTINUED | COMMUNITY
Start: 2021-10-06 | End: 2021-11-09

## 2021-11-09 RX ORDER — FOLIC ACID 1 MG/1
1 TABLET ORAL
Qty: 30 | Refills: 0 | Status: DISCONTINUED | COMMUNITY
Start: 2021-10-06 | End: 2021-11-09

## 2021-11-09 RX ORDER — FERROUS SULFATE TAB EC 325 MG (65 MG FE EQUIVALENT) 325 (65 FE) MG
325 (65 FE) TABLET DELAYED RESPONSE ORAL
Refills: 0 | Status: DISCONTINUED | COMMUNITY
End: 2021-11-09

## 2021-11-17 ENCOUNTER — APPOINTMENT (OUTPATIENT)
Dept: CARDIOTHORACIC SURGERY | Facility: CLINIC | Age: 71
End: 2021-11-17
Payer: MEDICARE

## 2021-11-17 ENCOUNTER — NON-APPOINTMENT (OUTPATIENT)
Age: 71
End: 2021-11-17

## 2021-11-17 ENCOUNTER — APPOINTMENT (OUTPATIENT)
Dept: CARDIOLOGY | Facility: CLINIC | Age: 71
End: 2021-11-17
Payer: MEDICARE

## 2021-11-17 VITALS
RESPIRATION RATE: 14 BRPM | SYSTOLIC BLOOD PRESSURE: 116 MMHG | TEMPERATURE: 97.3 F | WEIGHT: 113 LBS | OXYGEN SATURATION: 100 % | BODY MASS INDEX: 22.19 KG/M2 | HEIGHT: 60 IN | DIASTOLIC BLOOD PRESSURE: 78 MMHG | HEART RATE: 63 BPM

## 2021-11-17 VITALS
HEART RATE: 67 BPM | OXYGEN SATURATION: 100 % | DIASTOLIC BLOOD PRESSURE: 78 MMHG | SYSTOLIC BLOOD PRESSURE: 149 MMHG | HEIGHT: 60 IN | BODY MASS INDEX: 22.18 KG/M2

## 2021-11-17 VITALS — BODY MASS INDEX: 22.18 KG/M2 | WEIGHT: 113.56 LBS

## 2021-11-17 DIAGNOSIS — Z09 ENCOUNTER FOR FOLLOW-UP EXAMINATION AFTER COMPLETED TREATMENT FOR CONDITIONS OTHER THAN MALIGNANT NEOPLASM: ICD-10-CM

## 2021-11-17 DIAGNOSIS — Z95.1 PRESENCE OF AORTOCORONARY BYPASS GRAFT: ICD-10-CM

## 2021-11-17 PROCEDURE — 99024 POSTOP FOLLOW-UP VISIT: CPT

## 2021-11-17 PROCEDURE — 99215 OFFICE O/P EST HI 40 MIN: CPT

## 2021-11-17 PROCEDURE — 93000 ELECTROCARDIOGRAM COMPLETE: CPT

## 2021-11-17 RX ORDER — LOSARTAN POTASSIUM AND HYDROCHLOROTHIAZIDE 25; 100 MG/1; MG/1
100-25 TABLET ORAL DAILY
Qty: 90 | Refills: 3 | Status: ACTIVE | COMMUNITY
Start: 2021-11-17 | End: 1900-01-01

## 2021-11-17 RX ORDER — CARVEDILOL 25 MG/1
25 TABLET, FILM COATED ORAL TWICE DAILY
Qty: 90 | Refills: 3 | Status: ACTIVE | COMMUNITY
Start: 2021-11-17 | End: 1900-01-01

## 2021-11-17 RX ORDER — HYDROCHLOROTHIAZIDE 25 MG/1
25 TABLET ORAL DAILY
Qty: 30 | Refills: 3 | Status: DISCONTINUED | COMMUNITY
Start: 2021-10-20 | End: 2021-11-17

## 2021-11-17 RX ORDER — METOPROLOL TARTRATE 100 MG/1
100 TABLET, FILM COATED ORAL TWICE DAILY
Qty: 180 | Refills: 0 | Status: DISCONTINUED | COMMUNITY
Start: 1900-01-01 | End: 2021-11-17

## 2021-11-22 NOTE — REVIEW OF SYSTEMS
[Feeling Fatigued] : feeling fatigued [SOB] : shortness of breath [Dyspnea on exertion] : dyspnea during exertion [Fever] : no fever [Blurry Vision] : no blurred vision [Earache] : no earache [Sore Throat] : no sore throat [Leg Claudication] : no intermittent leg claudication [Syncope] : no syncope [Cough] : no cough [Change in Appetite] : no change in appetite [Dysuria] : no dysuria

## 2021-11-22 NOTE — PHYSICAL EXAM
[Well Developed] : well developed [Normal Conjunctiva] : normal conjunctiva [Normal Venous Pressure] : normal venous pressure [Normal Gait] : normal gait [No Rash] : no rash [Moves all extremities] : moves all extremities [de-identified] : 2 out of 6 ejection systolic murmur at the aortic area. [de-identified] : Lungs are clear chest wound seems to be healing well [de-identified] : Left thigh at the site of the surgical harvest site there is a little swelling under the surgical wound.

## 2021-11-22 NOTE — DISCUSSION/SUMMARY
[FreeTextEntry1] : 1 CAD, no angina at this time, continue with current medical therapy she is on aspirin and clopidogrel as she had NSTEMI, we will continue this for 12 months.  Aggressive risk factor modification.\par \par 2.  Mild segmental LV dysfunction on echocardiogram but overall ejection fraction is normal with clinically no evidence of congestive heart failure.  We will continue with current medical therapy, changing her metoprolol to carvedilol\par \par 3.  Hypertension her home blood pressure logs show the blood pressure at home has been in the range of 130s to 150s, I recommend that we change her metoprolol to carvedilol.  And follow-up the blood pressure clinically.\par \par 4.  Aortic valve disease, at this time aortic valve disease mild to moderate, clinical follow-up, echocardiogram in 2 to 3 years.\par Because of her wound concerns I spoke to surgery CT surgery at [facility], and arrange for her to be seen by the surgeons today.

## 2021-11-22 NOTE — CARDIOLOGY SUMMARY
[de-identified] : Sinus rhythm, LVH, repolarization changes [de-identified] : Ejection fraction 50 to 55%, aortic valve area 1.6 cm².

## 2021-11-22 NOTE — HISTORY OF PRESENT ILLNESS
[FreeTextEntry1] : With history of hypertension, type 2 diabetes, recent NSTEMI, severe triple-vessel disease underwent CABG with Dr. Edmond.  Ejection fraction is normal.  Clinically she is doing much better she has mild tenderness and pain along the sternal wound site but overall feels that she is making good recovery.  Daughter is a nurse practitioner at [employer] and she has been monitoring her mom's blood pressure at home and the blood pressure is in the range of 1 50-1 60 systolic.  \par Overall she feels well but she reports that at the site of the surgical wound on the legs she has little swelling and she is extremely concerned about it.

## 2021-12-03 ENCOUNTER — NON-APPOINTMENT (OUTPATIENT)
Age: 71
End: 2021-12-03

## 2021-12-15 ENCOUNTER — APPOINTMENT (OUTPATIENT)
Dept: UROGYNECOLOGY | Facility: CLINIC | Age: 71
End: 2021-12-15
Payer: MEDICARE

## 2021-12-15 VITALS — TEMPERATURE: 97.2 F

## 2021-12-15 PROCEDURE — 99213 OFFICE O/P EST LOW 20 MIN: CPT

## 2021-12-15 NOTE — PROCEDURE
[Good Fit] : fits well [Discharge] : there is vaginal discharge [Pessary Inserted] : inserted [Pessary Washed] : washed [H2O] : H2O [None] : no bleeding [Medication Review] : Medicaiton Review: Patient verbalizes understanding of risks and benefits [Bowel Management] : Bowel Management: patient verbalizes understanding of daily dietary fiber intake [Refit] : refit is not needed [Erosion] : no evidence of erosion [Erythema] : no erythema [Infection] : no evidence of infection [FreeTextEntry1] : Sonal# 4 (2.50) ( NO Pessart in place as it fell out) [FreeTextEntry3] : Premarin cream [FreeTextEntry4] : Advised to prevent straining with BM by managing with OTC stool softeners as needed.  [FreeTextEntry8] : Reinforced daily pericare.

## 2021-12-15 NOTE — HISTORY OF PRESENT ILLNESS
[FreeTextEntry1] : May, 69y/o female with known hx of POP managed with Shaatz #4 (2.50) presents to office for follow up. Patient states 1 week ago she was having a BM and the pessary fell out. States she has been doing well with pessary and is happy with this one. Denies any pelvic pain, pressure or vaginal bleeding.  Denies any urinary complaints. She states she has been having constipation. She manages her constipation with fluids and prune juice, though she continues to have constipation at times.  She is using Premarin cream twice a week.\par \par Of note patient states she had a triple cardiac bypass in October and is doing well. \par \par

## 2021-12-15 NOTE — PHYSICAL EXAM
[No Acute Distress] : in no acute distress [Well developed] : well developed [Well Nourished] : ~L well nourished [Good Hygeine] : demonstrates good hygeine [Oriented x3] : oriented to person, place, and time [Normal Memory] : ~T memory was ~L unimpaired [Normal Mood/Affect] : mood and affect are normal [Soft, Nontender] : the abdomen was soft and nontender [Warm and Dry] : was warm and dry to touch [Normal Gait] : gait was normal [Vulvar Atrophy] : vulvar atrophy [Labia Majora] : were normal [Normal Appearance] : general appearance was normal [Atrophy] : atrophy [Cystocele] : a cystocele [Uterine Prolapse] : uterine prolapse [Discharge] : a  ~M vaginal discharge was present [Scant] : scant [White] : white [No Bleeding] : there was no active vaginal bleeding [Normal] : normal [Labia Minora] : were normal [de-identified] : vaginal bulge noted at vaginal opening

## 2022-01-04 ENCOUNTER — APPOINTMENT (OUTPATIENT)
Dept: CARDIOLOGY | Facility: CLINIC | Age: 72
End: 2022-01-04

## 2022-01-10 ENCOUNTER — APPOINTMENT (OUTPATIENT)
Dept: CARDIOLOGY | Facility: CLINIC | Age: 72
End: 2022-01-10
Payer: MEDICARE

## 2022-01-10 ENCOUNTER — NON-APPOINTMENT (OUTPATIENT)
Age: 72
End: 2022-01-10

## 2022-01-10 PROCEDURE — 93798 PHYS/QHP OP CAR RHAB W/ECG: CPT

## 2022-01-12 ENCOUNTER — APPOINTMENT (OUTPATIENT)
Dept: CARDIOLOGY | Facility: CLINIC | Age: 72
End: 2022-01-12
Payer: MEDICARE

## 2022-01-12 PROCEDURE — 93798 PHYS/QHP OP CAR RHAB W/ECG: CPT

## 2022-01-17 ENCOUNTER — APPOINTMENT (OUTPATIENT)
Dept: CARDIOLOGY | Facility: CLINIC | Age: 72
End: 2022-01-17
Payer: MEDICARE

## 2022-01-17 PROCEDURE — 93798 PHYS/QHP OP CAR RHAB W/ECG: CPT

## 2022-01-17 NOTE — REASON FOR VISIT
[Home] : at home, [unfilled] , at the time of the visit. [Verbal consent obtained from patient] : the patient, [unfilled] [Medical Office: ([facility])___] : at the medical office located in  [FreeTextEntry1] : CR Intake.  S/P CABGx3 on 10/1/2021.  ITP to be completed upon first visit/Session #1

## 2022-01-19 ENCOUNTER — APPOINTMENT (OUTPATIENT)
Dept: CARDIOLOGY | Facility: CLINIC | Age: 72
End: 2022-01-19
Payer: MEDICARE

## 2022-01-19 PROCEDURE — 93798 PHYS/QHP OP CAR RHAB W/ECG: CPT

## 2022-02-09 ENCOUNTER — NON-APPOINTMENT (OUTPATIENT)
Age: 72
End: 2022-02-09

## 2022-02-16 ENCOUNTER — APPOINTMENT (OUTPATIENT)
Dept: CARDIOLOGY | Facility: CLINIC | Age: 72
End: 2022-02-16
Payer: MEDICARE

## 2022-02-16 ENCOUNTER — NON-APPOINTMENT (OUTPATIENT)
Age: 72
End: 2022-02-16

## 2022-02-16 VITALS
HEART RATE: 76 BPM | DIASTOLIC BLOOD PRESSURE: 83 MMHG | SYSTOLIC BLOOD PRESSURE: 191 MMHG | BODY MASS INDEX: 21.09 KG/M2 | HEIGHT: 60 IN

## 2022-02-16 VITALS — DIASTOLIC BLOOD PRESSURE: 75 MMHG | TEMPERATURE: 97.8 F | SYSTOLIC BLOOD PRESSURE: 173 MMHG

## 2022-02-16 VITALS — WEIGHT: 108 LBS | BODY MASS INDEX: 21.09 KG/M2

## 2022-02-16 PROCEDURE — 93000 ELECTROCARDIOGRAM COMPLETE: CPT

## 2022-02-16 PROCEDURE — 99215 OFFICE O/P EST HI 40 MIN: CPT

## 2022-02-22 ENCOUNTER — APPOINTMENT (OUTPATIENT)
Dept: UROGYNECOLOGY | Facility: CLINIC | Age: 72
End: 2022-02-22
Payer: MEDICARE

## 2022-02-22 PROCEDURE — 99213 OFFICE O/P EST LOW 20 MIN: CPT

## 2022-02-22 NOTE — HISTORY OF PRESENT ILLNESS
[Type II Diabetes] : Type II Diabetes [Yes ___ How many times per day?] : Yes, [unfilled] times per day [Is Patient aware of signs and symptoms of hypoglycemia and hyperglycemia?] : patient is aware of signs and symptoms of hypoglycemia and hyperglycemia [Does Patient follow with other health care specialists for comprehensive diabetes care?] : Does Patient follow with other health care specialists for comprehensive diabetes care? Yes [Home monitoring of blood sugar] : home monitoring of blood sugar [Following diabetes way of eating] : following diabetes way of eating [Individualized exercise program as developed at cardiac rehabilitation] : individualized exercise program as developed at cardiac rehabilitation [Hypoglycemia and Hyperglycemia: sign and symptoms] : Hypoglycemia and Hyperglycemia: sign and symptoms [Teach back utilized] : teach back utilized [Sitting: ___] : Sitting: [unfilled] [Does patient monitor blood pressure at home?] : patient monitors blood pressure at home [Low sodium diet] : low sodium diet [Patient will verbalize factors contributing to improved blood pressure management] : Patient will verbalize factors contributing to improved blood pressure management [Patient will maintain blood pressure < 130/80 mmHg] : patient will maintain blood pressure < 130/80 mmHg [Medication Adherence] : medication adherence [Home monitoring of blood pressure] : home monitoring of blood pressure [Diet changes including (healthy heart eating, less processed foods, low sodium diet)] : Diet changes including (healthy heart eating, less processed foods, low sodium diet) [Halt the salt] : halt the salt [Signs and symptoms] : signs and symptoms [Medications] : medications [Yes, continue with Hypertension plan] : Yes, continue with Hypertension plan [Action] : Stage of change: action [None] : none [CABG] : CABG [HLD] : HLD [Diabetes Mellitus] : diabetes mellitus [Sedentary] : sedentary [Facility-based] : Facility-based [___ Days per week] : [unfilled] days per week [>= 31 minutes per session] : >= 31 minutes per session [Treadmill] : treadmill [Recumbent bike] : recumbent bike [BioStep] : BioStep [Upper body ergometer] : upper body ergometer [Walking] : walking [Perform aerobic activity for ___ consecutive minutes] : perform aerobic activity for [unfilled] consecutive minutes [Signs/Symptoms to report] : signs/symptoms to report [Welcome packet provided] : welcome packet provided [PHQ-9: ___] : PHQ-9: [unfilled] [[facility] Score Total: ___] : [facility] COOP score total: [unfilled] [Feelings Score: ___] : Feelings Score: [unfilled] [Physical Fitness Score: ____] : Physical Fitness Score: [unfilled] [Daily Activities Score: ___] : Daily Activities Score: [unfilled] [Social Activities Score: ___] : Social Activities Score: [unfilled] [Pain Score: ___] : Pain Score: [unfilled] [Overall Health Score: ___] : Overall Health Score: [unfilled] [Change in Health Score: ___] : Change in Health Score: [unfilled] [Quality of Life Score: ___] : Quality of Life Score: [unfilled] [Social Support Score: ___] : Social Support Score: [unfilled] [Has the patient given CR team permission to speak with the emergency  about the patient?] : Has the patient given CR team permission to speak with the emergency  about the patient? Yes [Patient will include healthy emotional coping practices in everyday life, such as: ____] : Patient will include healthy emotional coping practices in everyday life, such as [unfilled] [Meditation] : meditation [Mindfulness] : mindfulness [Preparation/Planning] : Stage of change: preparation/planning [Rate your plate score: ____] : Rate your plate score: [unfilled] [Hypertension] : Hypertension [Diabetes] : Diabetes [Sugar] : sugar [Sodium] : sodium [Is patient on medication for hyperlipidemia?] : Is patient on medication for hyperlipidemia? Yes [Atorvastatin] : atorvastatin [Is Patient adherent with medication?] : patient is adherent with medication [Self] : self [Significant other] : significant other [Patient will include healthy diet pattern approaches to healthy eating] : Patient will include healthy diet pattern approaches to healthy eating [Self-reports of improved health eating patterns] : Self-reports of improved health eating patterns [MyPlate.gov] : MyPlate.gov [Discuss an overview of healthy eating plan] : Discuss an overview of healthy eating plan [FreeTextEntry2] : 113 [FreeTextEntry3] : 110-130 [FreeTextEntry4] : Carvedilol and Losartan/HCTZ [Angina with exercise] : no angina with exercise [Fall Risk] : no fall risk [] : no [FreeTextEntry1] : Izabel Stein [FreeTextEntry5] : Eric Edmond [FreeTextEntry6] : Pt. denies barriers to her emotional well-being. States she worries at times but, she is doing much better.  She is a devoted Catholic/Decon, states her Amish family and intermediate family are very supportive.  [Patient has seen registered dietitian in the past?] : Patient has seen registered dietitian in the past? No [Patient is interested in seeing a registered dietitian?] : Patient is interested in seeing a registered dietitian? No [In progress] : in progress [FreeTextEntry8] : Goals:\par Pt. will increase lean meat intake to 2-3 times per week\par Pt. will increase whole grain intake to 2-3 times per week\par Pt. will decrease rice/past intake to 0-1 time per week [FreeTextEntry7] : Pt. reports  decrease appetite and 19 lbs unintentional weight loss since surgery. States she recently included Glucerna shakes 1-2 times daily.  She noticed her appetite is slowly returning.

## 2022-02-22 NOTE — HISTORY OF PRESENT ILLNESS
[Type II Diabetes] : Type II Diabetes [Yes ___ How many times per day?] : Yes, [unfilled] times per day [Is Patient aware of signs and symptoms of hypoglycemia and hyperglycemia?] : patient is aware of signs and symptoms of hypoglycemia and hyperglycemia [Does Patient follow with other health care specialists for comprehensive diabetes care?] : Does Patient follow with other health care specialists for comprehensive diabetes care? Yes [Home monitoring of blood sugar] : home monitoring of blood sugar [Following diabetes way of eating] : following diabetes way of eating [Individualized exercise program as developed at cardiac rehabilitation] : individualized exercise program as developed at cardiac rehabilitation [Yes, continue with Diabetes plan] : Yes, continue with Diabetes plan [Sitting: ___] : Sitting: [unfilled] [Is Patient adherent with medication?] : patient is adherent with medication [Does patient monitor blood pressure at home?] : patient monitors blood pressure at home [Low sodium diet] : low sodium diet [Patient will verbalize factors contributing to improved blood pressure management] : Patient will verbalize factors contributing to improved blood pressure management [Patient will maintain blood pressure < 130/80 mmHg] : patient will maintain blood pressure < 130/80 mmHg [Medication Adherence] : medication adherence [Home monitoring of blood pressure] : home monitoring of blood pressure [Diet changes including (healthy heart eating, less processed foods, low sodium diet)] : Diet changes including (healthy heart eating, less processed foods, low sodium diet) [Halt the salt] : halt the salt [Signs and symptoms] : signs and symptoms [Medications] : medications [Sodium] : sodium [Yes, continue with Hypertension plan] : Yes, continue with Hypertension plan [None] : none [CABG] : CABG [HLD] : HLD [Hypertension] : hypertension [Diabetes Mellitus] : diabetes mellitus [Sedentary] : sedentary [BP: ____ mmHg] : BP: [unfilled] mmHg [HR: ____ bpm] : BP: [unfilled] bpm [RPE: ____] : RPE: [unfilled]  [METS: ____] : METS: [unfilled]  [Facility-based] : Facility-based [___ Days per week] : [unfilled] days per week [>= 31 minutes per session] : >= 31 minutes per session [Treadmill] : treadmill [Recumbent bike] : recumbent bike [BioStep] : BioStep [Upper body ergometer] : upper body ergometer [Walking] : walking [Assess in ___ weeks] : assess in [unfilled] weeks [8 - 15 repetitions] : 8 - 15 repetitions [1 - 3 sets] : 1 - 3 sets [Will assess for musculoskeletal and other restrictions] : will assess for musculoskeletal and other restrictions [Perform aerobic activity for ___ consecutive minutes] : perform aerobic activity for [unfilled] consecutive minutes [Equipment Orientation/Safety] : equipment orientation/safety [Exercise Benefits/Guidelines education] : exercise benefits/guidelines education [Individualized Exercise Rx] : individualized exercise Rx [Signs/Symptoms to report] : signs/symptoms to report [Hemodynamic responses] : hemodynamic responses [Patient verbalizes understanding of exercise education all questions answered] : Patient verbalizes understanding of exercise education all questions answered [Teach back utilized] : teach back utilized [Return demonstration] : return demonstration [Welcome packet provided] : welcome packet provided [Yes, per exercise prescription, policy] : Yes, per exercise prescription, policy [PHQ-9: ___] : PHQ-9: [unfilled] [[facility]: ___] : [facility] score total: [unfilled] [Feelings Score: ___] : Feelings Score: [unfilled] [Physical Fitness Score: ____] : Physical Fitness Score: [unfilled] [Daily Activities Score: ___] : Daily Activities Score: [unfilled] [Social Activities Score: ___] : Social Activities Score: [unfilled] [Pain Score: ___] : Pain Score: [unfilled] [Overall Health Score: ___] : Overall Health Score: [unfilled] [Change in Health Score: ___] : Change in Health Score: [unfilled] [Quality of Life Score: ___] : Quality of Life Score: [unfilled] [Social Support Score: ___] : Social Support Score: [unfilled] [Has the patient given CR team permission to speak with the emergency  about the patient?] : Has the patient given CR team permission to speak with the emergency  about the patient? Yes [Patient will include healthy emotional coping practices in everyday life, such as: ____] : Patient will include healthy emotional coping practices in everyday life, such as [unfilled] [Meditation] : meditation [Mindfulness] : mindfulness [Yes, continue with psychosocial plan] : Yes, continue with psychosocial plan [Action] : Stage of change: Action [Rate your plate score: ____] : Rate your plate score: [unfilled] [Discuss an overview of healthy eating plan] : Discuss an overview of healthy eating plan [Yes, continue with nutrition plan] : Yes, continue with nutrition plan [In progress] : in progress [FreeTextEntry2] : 113 [FreeTextEntry3] : 110-130 [FreeTextEntry4] : Carvedilol and Losartan/HCTZ [Angina with exercise] : no angina with exercise [Fall Risk] : no fall risk [] : no [FreeTextEntry1] : Izabel Stein [FreeTextEntry5] : Eric Edmond [FreeTextEntry7] : 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 Sates she keep active doing yard  work when the weather permits.  [FreeTextEntry6] : Pt. denies barriers to her emotional well-being. States she worries at times but, she is doing much better.  She is a devoted Pentecostal/Decon, states her Sikhism family and intermediate family are very supportive.  [FreeTextEntry8] : States when there is stress, she reads the bible and pray.

## 2022-02-23 ENCOUNTER — APPOINTMENT (OUTPATIENT)
Dept: CARDIOLOGY | Facility: CLINIC | Age: 72
End: 2022-02-23
Payer: MEDICARE

## 2022-02-23 PROCEDURE — 93798 PHYS/QHP OP CAR RHAB W/ECG: CPT

## 2022-02-23 NOTE — HISTORY OF PRESENT ILLNESS
[FreeTextEntry1] : May, 71y/o female with known hx of POP managed with Shaatz #4 (2.50) presents to office for follow up. Patient states 1 week ago she was having a BM and the pessary fell out. States she has been doing well with pessary and is happy with this one. Denies any pelvic pain, pressure or vaginal bleeding.  Denies any urinary complaints. She states she has been having constipation. She manages her constipation with fluids and prune juice, though she continues to have constipation at times.  She is using Premarin cream twice a week.\par \par Of note patient states she had a triple cardiac bypass in October and is doing well. \par \par

## 2022-02-23 NOTE — PHYSICAL EXAM
[No Acute Distress] : in no acute distress [Well developed] : well developed [Well Nourished] : ~L well nourished [Good Hygeine] : demonstrates good hygeine [Oriented x3] : oriented to person, place, and time [Normal Memory] : ~T memory was ~L unimpaired [Normal Mood/Affect] : mood and affect are normal [Soft, Nontender] : the abdomen was soft and nontender [Warm and Dry] : was warm and dry to touch [Normal Gait] : gait was normal [Vulvar Atrophy] : vulvar atrophy [Labia Majora] : were normal [Labia Minora] : were normal [Normal Appearance] : general appearance was normal [Atrophy] : atrophy [Cystocele] : a cystocele [Uterine Prolapse] : uterine prolapse [Discharge] : a  ~M vaginal discharge was present [Scant] : scant [White] : white [No Bleeding] : there was no active vaginal bleeding [Normal] : normal [de-identified] : vaginal bulge noted at vaginal opening

## 2022-02-28 ENCOUNTER — APPOINTMENT (OUTPATIENT)
Dept: CARDIOLOGY | Facility: CLINIC | Age: 72
End: 2022-02-28
Payer: MEDICARE

## 2022-02-28 PROCEDURE — 93798 PHYS/QHP OP CAR RHAB W/ECG: CPT

## 2022-03-01 ENCOUNTER — EMERGENCY (EMERGENCY)
Facility: HOSPITAL | Age: 72
LOS: 1 days | Discharge: HOME | End: 2022-03-01
Attending: EMERGENCY MEDICINE
Payer: COMMERCIAL

## 2022-03-01 VITALS
WEIGHT: 108.03 LBS | TEMPERATURE: 98 F | RESPIRATION RATE: 18 BRPM | OXYGEN SATURATION: 100 % | DIASTOLIC BLOOD PRESSURE: 79 MMHG | HEART RATE: 80 BPM | SYSTOLIC BLOOD PRESSURE: 190 MMHG | HEIGHT: 60 IN

## 2022-03-01 VITALS
SYSTOLIC BLOOD PRESSURE: 140 MMHG | OXYGEN SATURATION: 100 % | TEMPERATURE: 99 F | RESPIRATION RATE: 16 BRPM | DIASTOLIC BLOOD PRESSURE: 62 MMHG | HEART RATE: 80 BPM

## 2022-03-01 DIAGNOSIS — Z98.49 CATARACT EXTRACTION STATUS, UNSPECIFIED EYE: Chronic | ICD-10-CM

## 2022-03-01 LAB
ALBUMIN SERPL ELPH-MCNC: 4.5 G/DL — SIGNIFICANT CHANGE UP (ref 3.3–5)
ALP SERPL-CCNC: 73 U/L — SIGNIFICANT CHANGE UP (ref 40–120)
ALT FLD-CCNC: 14 U/L — SIGNIFICANT CHANGE UP (ref 10–45)
ANION GAP SERPL CALC-SCNC: 14 MMOL/L — SIGNIFICANT CHANGE UP (ref 5–17)
AST SERPL-CCNC: 18 U/L — SIGNIFICANT CHANGE UP (ref 10–40)
BASOPHILS # BLD AUTO: 0.04 K/UL — SIGNIFICANT CHANGE UP (ref 0–0.2)
BASOPHILS NFR BLD AUTO: 0.5 % — SIGNIFICANT CHANGE UP (ref 0–2)
BILIRUB SERPL-MCNC: 0.4 MG/DL — SIGNIFICANT CHANGE UP (ref 0.2–1.2)
BUN SERPL-MCNC: 41 MG/DL — HIGH (ref 7–23)
CALCIUM SERPL-MCNC: 9.7 MG/DL — SIGNIFICANT CHANGE UP (ref 8.4–10.5)
CHLORIDE SERPL-SCNC: 101 MMOL/L — SIGNIFICANT CHANGE UP (ref 96–108)
CO2 SERPL-SCNC: 21 MMOL/L — LOW (ref 22–31)
CREAT SERPL-MCNC: 1.02 MG/DL — SIGNIFICANT CHANGE UP (ref 0.5–1.3)
EGFR: 58 ML/MIN/1.73M2 — LOW
EOSINOPHIL # BLD AUTO: 0.1 K/UL — SIGNIFICANT CHANGE UP (ref 0–0.5)
EOSINOPHIL NFR BLD AUTO: 1.2 % — SIGNIFICANT CHANGE UP (ref 0–6)
GLUCOSE SERPL-MCNC: 129 MG/DL — HIGH (ref 70–99)
HCT VFR BLD CALC: 30.1 % — LOW (ref 34.5–45)
HGB BLD-MCNC: 9.8 G/DL — LOW (ref 11.5–15.5)
IMM GRANULOCYTES NFR BLD AUTO: 0.4 % — SIGNIFICANT CHANGE UP (ref 0–1.5)
LYMPHOCYTES # BLD AUTO: 2.22 K/UL — SIGNIFICANT CHANGE UP (ref 1–3.3)
LYMPHOCYTES # BLD AUTO: 26.3 % — SIGNIFICANT CHANGE UP (ref 13–44)
MCHC RBC-ENTMCNC: 29.8 PG — SIGNIFICANT CHANGE UP (ref 27–34)
MCHC RBC-ENTMCNC: 32.6 GM/DL — SIGNIFICANT CHANGE UP (ref 32–36)
MCV RBC AUTO: 91.5 FL — SIGNIFICANT CHANGE UP (ref 80–100)
MONOCYTES # BLD AUTO: 0.59 K/UL — SIGNIFICANT CHANGE UP (ref 0–0.9)
MONOCYTES NFR BLD AUTO: 7 % — SIGNIFICANT CHANGE UP (ref 2–14)
NEUTROPHILS # BLD AUTO: 5.45 K/UL — SIGNIFICANT CHANGE UP (ref 1.8–7.4)
NEUTROPHILS NFR BLD AUTO: 64.6 % — SIGNIFICANT CHANGE UP (ref 43–77)
NRBC # BLD: 0 /100 WBCS — SIGNIFICANT CHANGE UP (ref 0–0)
PLATELET # BLD AUTO: 281 K/UL — SIGNIFICANT CHANGE UP (ref 150–400)
POTASSIUM SERPL-MCNC: 4.1 MMOL/L — SIGNIFICANT CHANGE UP (ref 3.5–5.3)
POTASSIUM SERPL-SCNC: 4.1 MMOL/L — SIGNIFICANT CHANGE UP (ref 3.5–5.3)
PROT SERPL-MCNC: 7.5 G/DL — SIGNIFICANT CHANGE UP (ref 6–8.3)
RBC # BLD: 3.29 M/UL — LOW (ref 3.8–5.2)
RBC # FLD: 13.9 % — SIGNIFICANT CHANGE UP (ref 10.3–14.5)
SODIUM SERPL-SCNC: 136 MMOL/L — SIGNIFICANT CHANGE UP (ref 135–145)
TROPONIN T, HIGH SENSITIVITY RESULT: 11 NG/L — SIGNIFICANT CHANGE UP (ref 0–51)
TROPONIN T, HIGH SENSITIVITY RESULT: 12 NG/L — SIGNIFICANT CHANGE UP (ref 0–51)
WBC # BLD: 8.43 K/UL — SIGNIFICANT CHANGE UP (ref 3.8–10.5)
WBC # FLD AUTO: 8.43 K/UL — SIGNIFICANT CHANGE UP (ref 3.8–10.5)

## 2022-03-01 PROCEDURE — 85025 COMPLETE CBC W/AUTO DIFF WBC: CPT

## 2022-03-01 PROCEDURE — 80053 COMPREHEN METABOLIC PANEL: CPT

## 2022-03-01 PROCEDURE — 93010 ELECTROCARDIOGRAM REPORT: CPT

## 2022-03-01 PROCEDURE — 99285 EMERGENCY DEPT VISIT HI MDM: CPT | Mod: 25

## 2022-03-01 PROCEDURE — 70450 CT HEAD/BRAIN W/O DYE: CPT | Mod: MA

## 2022-03-01 PROCEDURE — 93005 ELECTROCARDIOGRAM TRACING: CPT

## 2022-03-01 PROCEDURE — 99285 EMERGENCY DEPT VISIT HI MDM: CPT

## 2022-03-01 PROCEDURE — 84484 ASSAY OF TROPONIN QUANT: CPT

## 2022-03-01 PROCEDURE — 70450 CT HEAD/BRAIN W/O DYE: CPT | Mod: 26,MA

## 2022-03-01 PROCEDURE — 36415 COLL VENOUS BLD VENIPUNCTURE: CPT

## 2022-03-01 RX ORDER — CARVEDILOL PHOSPHATE 80 MG/1
25 CAPSULE, EXTENDED RELEASE ORAL ONCE
Refills: 0 | Status: COMPLETED | OUTPATIENT
Start: 2022-03-01 | End: 2022-03-01

## 2022-03-01 RX ORDER — ACETAMINOPHEN 500 MG
975 TABLET ORAL ONCE
Refills: 0 | Status: COMPLETED | OUTPATIENT
Start: 2022-03-01 | End: 2022-03-01

## 2022-03-01 RX ADMIN — CARVEDILOL PHOSPHATE 25 MILLIGRAM(S): 80 CAPSULE, EXTENDED RELEASE ORAL at 21:14

## 2022-03-01 RX ADMIN — Medication 975 MILLIGRAM(S): at 18:12

## 2022-03-01 NOTE — ED PROVIDER NOTE - PHYSICAL EXAMINATION
GENERAL: Awake, alert, NAD  HEENT: NC/AT, moist mucous membranes, PERRL, EOMI  LUNGS: CTAB, no wheezes or crackles   CARDIAC: RRR, no m/r/g  ABDOMEN: Soft, normal BS, non tender, non distended, no rebound, no guarding  BACK: No midline spinal tenderness, no CVA tenderness  EXT: No edema, no calf tenderness, 2+ DP pulses bilaterally, no deformities.  NEURO: A&Ox3. CN 2-12 intact. Normal sensation. Strengths equal and intact bilaterally.   SKIN: Warm and dry. No rash.  PSYCH: Normal affect.

## 2022-03-01 NOTE — ED PROVIDER NOTE - RAPID ASSESSMENT
71 F w/ PMHx of CABG on Plavix p/w lower lip numbness and high blood pressure 174/92 measured at home. Reports resolved HA. Denies CP, abdominal pain, and current headache.    **Pt seen in the waiting room via teletriage by Wendy James (), documentation completed by Joe Ruiz. Pt to be sent to main ED for further evaluation - all orders placed to be followed by MD in the main ED**  Scribe Statement: Austin HARPER Cole (scribe), attest that this documentation has been prepared under the direction and in the presence of Wendy James ()

## 2022-03-01 NOTE — ED PROVIDER NOTE - NSFOLLOWUPINSTRUCTIONS_ED_ALL_ED_FT
You were evaluated in the Emergency Department for headache and lip numbness.       There are no signs of emergency conditions requiring admission to the hospital on today's workup.      We recommend that you see the NEUROLOGIST within the next week for follow up:     Dr. Clemens   [address], [place] [id number] [phone]    Bring a copy of your discharge paperwork (including any test results) to your doctor.    ***Return to the emergency department if you have any other new/concerning symptoms, including but not limited to: worsening headache, vomiting, difficulty speaking, confusion***

## 2022-03-01 NOTE — CONSULT NOTE ADULT - SUBJECTIVE AND OBJECTIVE BOX
HPI:  71 year old right-handed woman with PMHx NSTEMI, CABG on Plavix, HTN, HLD, T2DM, cataract surgery presents with lower lip numbness and high blood pressure up to 174/92 at home associated with headache. Symptoms resolved after 1 hour. Neurology consulted for TIA in context of CTH findings which show age-indeterminate lacunar infarct in left parietal periventicular white matter. She continues anti-platelet for her cardiac symptoms, no known prior hx of stroke or TIA.    MEDICATIONS  (STANDING):    MEDICATIONS  (PRN):    PAST MEDICAL & SURGICAL HISTORY:  Diabetes  Hypertension  Hyperlipidemia  NSTEMI (non-ST elevation myocardial infarction)  HTN (hypertension)  S/P cataract surgery    FAMILY HISTORY:  Family history of hypertension (Mother)  Family history of diabetes mellitus (Father)  Family history of coronary artery disease (Mother)      Allergies  Aleve (Rash)  lisinopril (Rash)    SHx - No smoking, No ETOH, No drug abuse    Review of Systems:  CONSTITUTIONAL: No fevers, chills  HEENT:  No visual loss, blurred vision, double vision.  No hearing loss, sneezing, congestion, runny nose or sore throat.  SKIN:  No rash or itching.  CARDIOVASCULAR:  No chest pain, chest pressure or chest discomfort. No palpitations or edema.  RESPIRATORY:  No shortness of breath, cough or sputum.  GASTROINTESTINAL:  No anorexia, nausea, vomiting or diarrhea. No abdominal pain.  GENITOURINARY:  No dysuria. No increased frequency. No retention. No incontinence.  NEUROLOGICAL:  See HPI  MUSCULOSKELETAL:  No muscle, back pain, joint pain or stiffness.  HEMATOLOGIC:  No anemia, bleeding or bruising.    Vital Signs Last 24 Hrs  T(C): 37.1 (01 Mar 2022 18:21), Max: 37.1 (01 Mar 2022 18:21)  T(F): 98.7 (01 Mar 2022 18:21), Max: 98.7 (01 Mar 2022 18:21)  HR: 74 (01 Mar 2022 20:00) (74 - 80)  BP: 167/74 (01 Mar 2022 20:00) (167/74 - 190/79)  BP(mean): 102 (01 Mar 2022 20:00) (102 - 102)  RR: 17 (01 Mar 2022 20:00) (17 - 18)  SpO2: 100% (01 Mar 2022 20:00) (100% - 100%)    PHYSICAL EXAM:  GENERAL: NAD  HEENT: Normocephalic;  conjunctivae and sclerae clear; moist mucous membranes;   NECK: Supple  EXTREMITIES: No cyanosis; no edema; no calf tenderness  SKIN: Warm and dry; no rash             Neurological Exam:  - Mental Status:  AAOx3; speech is fluent with intact naming, repetition, and comprehension  - Cranial Nerves II-XII:    II:  PERRLA; visual fields are full to confrontation  III, IV, VI:  EOMI, no nystagmus  V:  facial sensation is intact in the V1-V3 distribution bilaterally.  VII:  face is symmetric with normal eye closure and smile  VIII:  hearing is intact to finger rub  IX, X:  uvula is midline and soft palate rises symmetrically  XI:  head turning and shoulder shrug are intact bilaterally  XII:  tongue protrudes in the midline  - Motor:  strength is 5/5 throughout; normal muscle bulk and tone throughout; no pronator drift  - Reflexes:  2+ and symmetric at the biceps, triceps, brachioradialis, knees, and ankles;  plantar reflexes downgoing bilaterally  - Sensory:  intact to light touch and symmetric throughout  - Coordination:  finger-nose-finger and heel-knee-shin intact without dysmetria   - Gait:  normal steps, base, arm swing, and turning; Romberg testing is negative    Other:    03-01    136  |  101  |  41<H>  ----------------------------<  129<H>  4.1   |  21<L>  |  1.02    Ca    9.7      01 Mar 2022 18:23    TPro  7.5  /  Alb  4.5  /  TBili  0.4  /  DBili  x   /  AST  18  /  ALT  14  /  AlkPhos  73  03-01                            9.8    8.43  )-----------( 281      ( 01 Mar 2022 18:23 )             30.1     Radiology  CT Head No Cont (03.01.22 @ 17:57) >  IMPRESSION:    No evidence for acute intracranial hemorrhage.    Age-indeterminate lacunar infarct in the left parietal periventricular   white matter.    If the patient has new and persistent symptoms, consider short interval   follow-up head CT or brain MRI follow-up.            HPI:  71 year old right-handed woman with PMHx NSTEMI, CABG on Plavix, HTN, HLD, T2DM, cataract surgery presents with lower lip numbness and high blood pressure up to 174/92 at home associated with headache. She states she was standing up in the kitchen when she develop numbness right below her lower lip, after which she measured her BP and it was found to be high. Her daughter advised her to take her losartan/HCTZ pill. Symptoms resolved after 1 hour. Neurology consulted for TIA in context of CTH findings which show age-indeterminate lacunar infarct in left parietal periventricular white matter. She continues anti-platelet for her cardiac symptoms, no known prior hx of stroke or TIA. Patient denied speech difficulties, numbness, unilateral weakness, fall, head trauma.     MEDICATIONS  (STANDING):    MEDICATIONS  (PRN):    PAST MEDICAL & SURGICAL HISTORY:  Diabetes  Hypertension  Hyperlipidemia  NSTEMI (non-ST elevation myocardial infarction)  HTN (hypertension)  S/P cataract surgery    FAMILY HISTORY:  Family history of hypertension (Mother)  Family history of diabetes mellitus (Father)  Family history of coronary artery disease (Mother)    Allergies  Aleve (Rash)  lisinopril (Rash)    SHx - No smoking, No ETOH, No drug abuse    Review of Systems:  CONSTITUTIONAL: No fevers, chills  HEENT:  No visual loss, blurred vision, double vision.  No hearing loss, sneezing, congestion, runny nose or sore throat.  SKIN:  No rash or itching.  CARDIOVASCULAR:  No chest pain, chest pressure or chest discomfort. No palpitations or edema.  RESPIRATORY:  No shortness of breath, cough or sputum.  GASTROINTESTINAL:  No anorexia, nausea, vomiting or diarrhea. No abdominal pain.  GENITOURINARY:  No dysuria. No increased frequency. No retention. No incontinence.  NEUROLOGICAL:  See HPI  MUSCULOSKELETAL:  No muscle, back pain, joint pain or stiffness.  HEMATOLOGIC:  No anemia, bleeding or bruising.    Vital Signs Last 24 Hrs  T(C): 37.1 (01 Mar 2022 18:21), Max: 37.1 (01 Mar 2022 18:21)  T(F): 98.7 (01 Mar 2022 18:21), Max: 98.7 (01 Mar 2022 18:21)  HR: 74 (01 Mar 2022 20:00) (74 - 80)  BP: 167/74 (01 Mar 2022 20:00) (167/74 - 190/79)  BP(mean): 102 (01 Mar 2022 20:00) (102 - 102)  RR: 17 (01 Mar 2022 20:00) (17 - 18)  SpO2: 100% (01 Mar 2022 20:00) (100% - 100%)    PHYSICAL EXAM:  GENERAL: NAD  HEENT: Normocephalic;  conjunctivae and sclerae clear; moist mucous membranes;   NECK: Supple  EXTREMITIES: No cyanosis; no edema; no calf tenderness  SKIN: Warm and dry; no rash             Neurological Exam:  - Mental Status: Alert, oriented to person, place, time, situation; speech is fluent with intact naming, repetition, and comprehension; follows crossed commands  - Cranial Nerves II-XII:    II:  PERRL 3mm b/l; visual fields are full to confrontation  III, IV, VI:  EOMI, no nystagmus  V:  facial sensation is intact in the V1-V3 distribution bilaterally.  VII:  face is symmetric with normal eye closure and smile  VIII:  hearing is intact to finger rub  IX, X:  uvula is midline and soft palate rises symmetrically  XI:  head turning and shoulder shrug are intact bilaterally  XII:  tongue protrudes in the midline  - Motor:  strength is 5/5 throughout; normal muscle bulk and tone throughout; no pronator drift  - Reflexes:  2+ and symmetric at the biceps, triceps, brachioradialis, knees, and ankles;  plantar reflexes downgoing bilaterally  - Sensory:  intact to light touch and symmetric throughout  - Coordination:  finger-nose-finger and heel-knee-shin intact without dysmetria   - Gait:  normal steps, base, arm swing, and turning; Romberg testing is negative    Other:    03-01    136  |  101  |  41<H>  ----------------------------<  129<H>  4.1   |  21<L>  |  1.02    Ca    9.7      01 Mar 2022 18:23    TPro  7.5  /  Alb  4.5  /  TBili  0.4  /  DBili  x   /  AST  18  /  ALT  14  /  AlkPhos  73  03-01                         9.8    8.43  )-----------( 281      ( 01 Mar 2022 18:23 )             30.1     Radiology  CT Head No Cont (03.01.22 @ 17:57) >  IMPRESSION:    No evidence for acute intracranial hemorrhage.    Age-indeterminate lacunar infarct in the left parietal periventricular   white matter.    If the patient has new and persistent symptoms, consider short interval   follow-up head CT or brain MRI follow-up.

## 2022-03-01 NOTE — ED PROVIDER NOTE - CLINICAL SUMMARY MEDICAL DECISION MAKING FREE TEXT BOX
72 y/o F with PMH CABG, HTN, HLD, DM presenting to the ED with headache and lip numbness in setting of elevated BP. Patient denies any symptoms on exam. Vitals significant for HTN. Neuro intact. Will obtain labs and CT head in setting of headache and neuro deficit. Reassess following labs and imaging. Caleb Marie, DO PGY3

## 2022-03-01 NOTE — CONSULT NOTE ADULT - ASSESSMENT
Hypertensive urgency vs emergency vs. TIA however not well localized    - Continue Aspirin 81mg  - MRI brain can be done non-urgently as outpatient  - HbA1C, lipid profile  - TTE    Patient can follow up with Dr. Leo Clemens as outpatient:  [address] [place] [address] [phone] 71 year old right-handed woman with PMHx NSTEMI, CABG on Plavix, HTN, HLD, T2DM, cataract surgery presents with lower lip numbness and high blood pressure up to 174/92 at home associated with headache. On arrival noted to be 190/79. Symptoms have resolved and pt has no complaints. No focal neurological deficits at this time. CTH shows age-indeterminate infarct in left parietal periventricular white matter, likely incidental. LKN 3/1/22 12:30pm, NIHSS 0, preMRS 0.    Impression: Hypertensive urgency vs emergency vs. TIA however not well-localized. Numb chin syndrome is a consideration which can be seen with thalamic strokes.    Recommendations:  - Continue Aspirin 81mg  - MRI brain w/o contrast and TTE can be done non-urgently as outpatient  - HbA1C, lipid profile    Patient can follow up with Dr. Leo Clemens as outpatient:  [address] [place] [address]  [phone]    Tressa Grijalva DO  PGY-3  Neurology Resident 71 year old right-handed woman with PMHx NSTEMI, CABG on Plavix, HTN, HLD, T2DM, cataract surgery presents with lower lip numbness and high blood pressure up to 174/92 at home associated with headache. On arrival noted to be 190/79. Symptoms have resolved and pt has no complaints. No focal neurological deficits at this time. CTH shows age-indeterminate infarct in left parietal periventricular white matter, likely incidental. LKN 3/1/22 12:30pm, NIHSS 0, preMRS 0.    Impression: Hypertensive urgency vs emergency vs. TIA however not well-localized. Numb chin syndrome is a consideration which can be seen with thalamic strokes.    Recommendations:  - Continue Aspirin 81mg  - MRI brain w/o contrast and TTE can be done non-urgently as outpatient  - HbA1C, lipid profile    Patient can follow up with Dr. Leo Clemens as outpatient:  [address] [place] [address]  [phone]    Tressa Grijalva DO  PGY-3  Neurology Resident    discharged prior to attending evaluation. quick office f/u

## 2022-03-01 NOTE — ED ADULT NURSE NOTE - OBJECTIVE STATEMENT
Pt 70 y/o female presenting to the ED c/o Pt 72 y/o female presenting to the ED c/o headache and lower lip numbness x1 day. PMH of HTN and HLD, compliant w/ daily medication. Upon assessment, pt AxO x3, sitting up in bed, and speaking in full sentences. Breathing spontaneously and unlabored, maintained on continuous pulse ox, >95% RA. Abdomen is soft and non-tender to palpation. EKG done, given to MD, maintained on cardiac monitor. Pt ambulates w/o difficultly and moves all extremities w/ = strength. Skin is warm, dry, and intact, w/ + peripheral pulses. Pt denies chest pain, SOB, n/v/d, fevers. Safety and comfort measures- bed in lowest position, locked, blanket given. Pt orient to call-bell system.

## 2022-03-01 NOTE — ED PROVIDER NOTE - PROGRESS NOTE DETAILS
Angelo, PGY2: spoke with neurology, patient stable for discharge with neuro follow up. discussed with patient and daughter at bedside, in agreement with plan.

## 2022-03-01 NOTE — ED PROVIDER NOTE - OBJECTIVE STATEMENT
72 y/o F with PMH CABG x 3, HTN, HLD, DM presenting to the ED c/o headache and lip numbness. States she had onset of headache and lower lip numbness that occurred around 12:30pm and lasted for an hour and then resolved. Her BP at that time was 174/92. She denies hx of similar symptoms in the past. No CP or SOB. No focal weakness. No nausea, vomiting, or vision changes.

## 2022-03-01 NOTE — ED ADULT NURSE NOTE - NS ED NURSE LEVEL OF CONSCIOUSNESS ORIENTATION
Date of Service: 12/28/2017  Referred by Dr JACINTO Arellano.  INFECTIOUS DISEASES TELE-CONSULT    REQUESTING PHYSICIAN:  Pritesh Arellano MD.    CHIEF COMPLAINT:  Abdominal wall infection.    HISTORY OF PRESENT ILLNESS:  This patient is a 48-year-old female with a number of comorbidities who presented with complaints of pain in her right inguinal region that began about pain 10 days ago.  She was actually admitted to the hospital on 12/23/2017.  She was noted to have a tender, indurated area with erythema and drainage, and she complained of malaise and fever.  A culture was taken.  I am not sure how this was obtained.  The Gram stain did not show PMNs.  It did show many gram-positive cocci and grew MRSA.  Drainage was said to be bloody and purulent.  She had had poor p.o. intake.  There was no history of any nausea, vomiting, diarrhea, constipation or actual abdominal pain.  There were no urinary symptoms.  No headaches, confusion, visual changes, sore throat, cough, chest pain, shortness of breath, sputum production or hemoptysis.    She was placed on Vancomycin on 12/23/2017 as well as Cefepime on 12/23/2017.  I added Metronidazole last night.  Her white blood cell count on admission was in the 13,000 to 15,000 range with 4-13% bands.  Her white count today is down to 9900 with 72% PMNs.  Her procalcitonin level was 0.29.  Chest x-ray did not show any evidence for pneumonia.  CT scan of the abdomen and pelvis showed a right lateral abdominal wall infection extending into the right inguinal region.  This morning she was taken to the OR by Dr. Antoine Moon who did an excisional debridement of extensive soft tissue infection of the right abdominal wall.  Postoperatively, she appears to be hemodynamically fairly stable, although mildly almost hypotensive.    REVIEW OF SYSTEMS:  Unobtainable at this time.    PAST MEDICAL HISTORY:  1.  Anemia.  2.  Anxiety, depression.  3.  DJD.  4.  Back pain.  5.  Chronic renal failure.  6.   Fibromyalgia.  7.  GERD.  8.  Headaches.  9.  Inflammatory bowel disease.  10.  Liver disease, ?.  11.  Obesity.  12.  Reactive airways disease.  13.  Hypothyroidism.  14.  Right knee has been replaced.  15.  Cholecystectomy.    ALLERGIES:  Aspirin, Dilaudid, Ibuprofen, Lamictal, Lithium, Lyrica, Reglan.    SOCIAL HISTORY:  She smokes half pack a day of cigarettes and drinks 5 alcoholic beverages a week.    FAMILY HISTORY:  Not obtainable at this time.    PHYSICAL EXAMINATION:  Physical exam is limited as this is a tele-consult.  VITAL SIGNS:  Last vitals show temperature 97.1, blood pressure 102/64, heart rate 79, respiratory rate 16, O2 saturation on 2 liters nasal cannula is 97%.  Highest temperature in the last 24 hours is 98.7, highest temperature since admission is 101, which was on the day of admission.    LABORATORY DATA:  Her labs were reviewed.  In addition to the above, procalcitonin level was 0.29.  Vancomycin WINTER for the MRSA was 1.  Creatinine is 0.85.    IMPRESSION:  Abdominal wall infection in a nondiabetic patient with obesity.  The culture which grew Methicillin-resistant Staphylococcus aureus may or may not be the pathogen.  Will await cultures from.  She is currently on broad-spectrum antibiotics and well covered.    RECOMMENDATIONS:  1.  Continue Vancomycin.  2.  Continue Cefepime. Will discuss dosing with pharmacy.  3.  Continue Metronidazole.  4.  Further recommendations to follow.      Dictated By: Debbie Kc MD  Signing Provider: Debbie Kc MD    CA/CLT ([id number])  DD: 12/28/2017 16:10:17 TD: 12/28/2017 16:27:12    Copy Sent To:     cc: Pritesh Arellano MD     Oriented - self; Oriented - place; Oriented - time

## 2022-03-01 NOTE — ED PROVIDER NOTE - ATTENDING CONTRIBUTION TO CARE
Attending MD Caceres:  I personally have seen and examined this patient. I have performed a substantive portion of the visit including all aspects of the medical decision making.  Resident note reviewed and agree on plan of care and except where noted.  See HPI, PE, and MDM for details.      70 y/o F with PMH CABG x 3, HTN, HLD, DM presenting to the ED c/o headache and lip numbness. Symptoms have since resolved completely. Non-focal neuro exam. Overall low suspicion for TIA, CT head did show age indeterminate lacunar infarct though I believe this is incidental and not related to current presentation. Neurology consulted and will follow their recommendations.        T(C): 37.1 (01 Mar 2022 18:21), Max: 37.1 (01 Mar 2022 18:21)  T(F): 98.7 (01 Mar 2022 18:21), Max: 98.7 (01 Mar 2022 18:21)  HR: 74 (01 Mar 2022 20:00) (74 - 80)  BP: 167/74 (01 Mar 2022 20:00) (167/74 - 190/79)  BP(mean): 102 (01 Mar 2022 20:00) (102 - 102)  RR: 17 (01 Mar 2022 20:00) (17 - 18)  SpO2: 100% (01 Mar 2022 20:00) (100% - 100%)      Attending MD Caceres:    Gen:  well appearing   Neck: supple, no meningimus  CV: heart with reg rhythm, no obvious murmur appreciated. radial pulses 2+ bilaterally, upper and lower extremities warm to the touch   Resp: clear to auscultation anteriorly bilaterally, breathing comfortably  Abd: soft, NT, ND  Extremities: ankles warm to the touch, no appreciable ankle edema bilaterally  Pysch: appropriate affect    Neuro: Awake and alert. Symmetric eyebrow raise, symmetric eyelid closure. PERRL b/l, EOMI b/l, symmetric smile, tongue midline. No pronator drift bilateral upper extremities. 5/5  strength bilaterally, 5/5 elbow flexion bilaterally, 5/5 elbow extension bilaterally, 5/5 shoulder shrug b/l.  No drift bilateral lower extremities. 5/5 plantar and dorsiflexion b/l, 5/5 knee flexion and extension b/l, 5/5 hip flexion and extension b/l. Sensation intact and symmetric grossly to light touch throughout face and bilateral upper and lower extremities,           *The above represents an initial assessment/impression. Please refer to progress notes for potential changes in patient clinical course*

## 2022-03-01 NOTE — ED PROVIDER NOTE - PATIENT PORTAL LINK FT
You can access the FollowMyHealth Patient Portal offered by [facility] by registering at the following website: [email or web address]. By joining [employer]’s FollowMyHealth portal, you will also be able to view your health information using other applications (apps) compatible with our system.

## 2022-03-07 ENCOUNTER — APPOINTMENT (OUTPATIENT)
Dept: CARDIOLOGY | Facility: CLINIC | Age: 72
End: 2022-03-07
Payer: MEDICARE

## 2022-03-07 PROCEDURE — 93798 PHYS/QHP OP CAR RHAB W/ECG: CPT

## 2022-03-09 ENCOUNTER — APPOINTMENT (OUTPATIENT)
Dept: CARDIOLOGY | Facility: CLINIC | Age: 72
End: 2022-03-09
Payer: MEDICARE

## 2022-03-09 PROCEDURE — 93798 PHYS/QHP OP CAR RHAB W/ECG: CPT

## 2022-03-10 NOTE — HISTORY OF PRESENT ILLNESS
[Type II Diabetes] : Type II Diabetes [Yes ___ How many times per day?] : Yes, [unfilled] times per day [Is Patient aware of signs and symptoms of hypoglycemia and hyperglycemia?] : patient is aware of signs and symptoms of hypoglycemia and hyperglycemia [Does Patient follow with other health care specialists for comprehensive diabetes care?] : Does Patient follow with other health care specialists for comprehensive diabetes care? Yes [Home monitoring of blood sugar] : home monitoring of blood sugar [Following diabetes way of eating] : following diabetes way of eating [Individualized exercise program as developed at cardiac rehabilitation] : individualized exercise program as developed at cardiac rehabilitation [Hypoglycemia and Hyperglycemia: sign and symptoms] : Hypoglycemia and Hyperglycemia: sign and symptoms [Sitting: ___] : Sitting: [unfilled] [Does patient monitor blood pressure at home?] : patient monitors blood pressure at home [Low sodium diet] : low sodium diet [Patient will verbalize factors contributing to improved blood pressure management] : Patient will verbalize factors contributing to improved blood pressure management [Patient will maintain blood pressure < 130/80 mmHg] : patient will maintain blood pressure < 130/80 mmHg [Medication Adherence] : medication adherence [Home monitoring of blood pressure] : home monitoring of blood pressure [Diet changes including (healthy heart eating, less processed foods, low sodium diet)] : Diet changes including (healthy heart eating, less processed foods, low sodium diet) [Halt the salt] : halt the salt [Signs and symptoms] : signs and symptoms [Medications] : medications [Exercise and blood pressure] : exercise and blood pressure [Food labels] : food labels [Processed food] : processed food [CareNotes written materials provided] : CareNotes written materials provided [Yes, continue with Hypertension plan] : Yes, continue with Hypertension plan [None] : none [CABG] : CABG [HLD] : HLD [Diabetes Mellitus] : diabetes mellitus [Sedentary] : sedentary [BP: ____ mmHg] : BP: [unfilled] mmHg [HR: ____ bpm] : BP: [unfilled] bpm [O2sat: ____ %] : O2sat: [unfilled] % [RPE: ____] : RPE: [unfilled]  [METS: ____] : METS: [unfilled]  [Facility-based] : Facility-based [___ Days per week] : [unfilled] days per week [>= 31 minutes per session] : >= 31 minutes per session [Treadmill] : treadmill [Recumbent bike] : recumbent bike [BioStep] : BioStep [Upper body ergometer] : upper body ergometer [Stair Climber] : stair climber [Walking] : walking [Assess in ___ weeks] : assess in [unfilled] weeks [8 - 15 repetitions] : 8 - 15 repetitions [1 - 3 sets] : 1 - 3 sets [Perform aerobic activity for ___ consecutive minutes] : perform aerobic activity for [unfilled] consecutive minutes [Signs/Symptoms to report] : signs/symptoms to report [Welcome packet provided] : welcome packet provided [PHQ-9: ___] : PHQ-9: [unfilled] [[facility] Total: ___] : [facility] score total: [unfilled] [Feelings Score: ___] : Feelings Score: [unfilled] [Physical Fitness Score: ____] : Physical Fitness Score: [unfilled] [Daily Activities Score: ___] : Daily Activities Score: [unfilled] [Social Activities Score: ___] : Social Activities Score: [unfilled] [Pain Score: ___] : Pain Score: [unfilled] [Overall Health Score: ___] : Overall Health Score: [unfilled] [Change in Health Score: ___] : Change in Health Score: [unfilled] [Quality of Life Score: ___] : Quality of Life Score: [unfilled] [Social Support Score: ___] : Social Support Score: [unfilled] [Has the patient given CR team permission to speak with the emergency  about the patient?] : Has the patient given CR team permission to speak with the emergency  about the patient? Yes [Patient will include healthy emotional coping practices in everyday life, such as: ____] : Patient will include healthy emotional coping practices in everyday life, such as [unfilled] [Meditation] : meditation [Rate your plate score: ____] : Rate your plate score: [unfilled] [Hypertension] : Hypertension [Diabetes] : Diabetes [Sugar] : sugar [Sodium] : sodium [Is patient on medication for hyperlipidemia?] : Is patient on medication for hyperlipidemia? Yes [Atorvastatin] : atorvastatin [Is Patient adherent with medication?] : patient is adherent with medication [Self] : self [Significant other] : significant other [Patient will include healthy diet pattern approaches to healthy eating] : Patient will include healthy diet pattern approaches to healthy eating [Self-reports of improved health eating patterns] : Self-reports of improved health eating patterns [Discuss an overview of healthy eating plan] : Discuss an overview of healthy eating plan [FreeTextEntry2] : 113 [FreeTextEntry3] : 110-130 [FreeTextEntry4] : Carvedilol and Losartan/HCTZ [Angina with exercise] : no angina with exercise [Fall Risk] : no fall risk [] : no [Yes, per exercise prescription, policy] : Yes, per exercise prescription, policy [FreeTextEntry1] : Izabel Stein [FreeTextEntry5] : Eric Edmond [de-identified] : Pt invited to attend weekly virtual session, declined. [de-identified] : METS on RB increased from 3.2 to 3.3 since last ITP\par METS on TM increased from 2.4 to 2.8 since last ITP [Relaxation breathing techniques] : relaxation breathing techniques [Yes, continue with psychosocial plan] : Yes, continue with psychosocial plan [In progress] : in progress [FreeTextEntry6] : Pt. denies barriers to her emotional well-being. States she worries at times but, she is doing much better.  She is a devoted [employer]ism/[employer], states her Sikhism family and intermediate family are very supportive.  [Action] : Stage of change: Action [Patient has seen registered dietitian in the past?] : Patient has seen registered dietitian in the past? No [Patient is interested in seeing a registered dietitian?] : Patient is interested in seeing a registered dietitian? No [DASH Diet] : DASH diet [Mediterranean Diet] : Mediterranean diet [MyPlate.gov] : MyPlate.gov [CareNotes written material provided] : CareNotes written material provided [Teach back utilized] : teach back utilized [Yes, continue with nutrition plan] : Yes, continue with nutrition plan [FreeTextEntry8] : Goals:\par Pt. will increase lean meat intake to 2-3 times per week\par Pt. will increase whole grain intake to 2-3 times per week\par Pt. will decrease rice/past intake to 0-1 time per week [FreeTextEntry7] : Pt. reports  decrease appetite and 19 lbs unintentional weight loss since surgery. States she recently included Glucerna shakes 1-2 times daily.  She noticed her appetite is slowly returning.   [FreeTextEntry9] : C

## 2022-03-11 ENCOUNTER — NON-APPOINTMENT (OUTPATIENT)
Age: 72
End: 2022-03-11

## 2022-03-14 ENCOUNTER — APPOINTMENT (OUTPATIENT)
Dept: CARDIOLOGY | Facility: CLINIC | Age: 72
End: 2022-03-14
Payer: MEDICARE

## 2022-03-14 PROCEDURE — 93798 PHYS/QHP OP CAR RHAB W/ECG: CPT

## 2022-03-14 NOTE — PHYSICAL EXAM
[Well Developed] : well developed [Normal Conjunctiva] : normal conjunctiva [Normal Venous Pressure] : normal venous pressure [Normal Gait] : normal gait [No Rash] : no rash [Moves all extremities] : moves all extremities [de-identified] : 2 out of 6 ejection systolic murmur at the aortic area. [de-identified] : Lungs are clear chest wound seems to be healing well [de-identified] : Left thigh at the site of the surgical harvest site there is a little swelling under the surgical wound.

## 2022-03-14 NOTE — HISTORY OF PRESENT ILLNESS
[FreeTextEntry1] : 71 yr F HTN, s/p CABG\par Overall making slow steady post surgery progress. \par Participating in rehab. \par

## 2022-03-14 NOTE — DISCUSSION/SUMMARY
[FreeTextEntry1] : 1 CAD, no angina at this time, continue with current medical therapy she is on aspirin and clopidogrel as she had NSTEMI, we will continue this for 12 months.  Aggressive risk factor modification.\par \par 2.  Mild segmental LV dysfunction on echocardiogram but overall ejection fraction is normal with clinically no evidence of congestive heart failure.  We will continue with current medical therapy, changing her metoprolol to carvedilol\par \par 3.  Hypertension: very elevated , but as per patient and family BP is better at home. I recommend maintain home BP logs and reassess in 1 week. \par \par 4.  Aortic valve disease, at this time aortic valve disease mild to moderate, clinical follow-up, echocardiogram in 2 to 3 years.

## 2022-03-14 NOTE — CARDIOLOGY SUMMARY
[de-identified] : Sinus rhythm, LVH, repolarization changes [de-identified] : Ejection fraction 50 to 55%, aortic valve area 1.6 cm².

## 2022-03-14 NOTE — REVIEW OF SYSTEMS
[Fever] : no fever [Feeling Fatigued] : feeling fatigued [Blurry Vision] : no blurred vision [Earache] : no earache [Sore Throat] : no sore throat [SOB] : shortness of breath [Dyspnea on exertion] : dyspnea during exertion [Leg Claudication] : no intermittent leg claudication [Syncope] : no syncope [Cough] : no cough [Dysuria] : no dysuria [Change in Appetite] : no change in appetite

## 2022-03-16 ENCOUNTER — APPOINTMENT (OUTPATIENT)
Dept: CARDIOLOGY | Facility: CLINIC | Age: 72
End: 2022-03-16
Payer: MEDICARE

## 2022-03-16 PROCEDURE — 93798 PHYS/QHP OP CAR RHAB W/ECG: CPT

## 2022-03-21 ENCOUNTER — APPOINTMENT (OUTPATIENT)
Dept: CARDIOLOGY | Facility: CLINIC | Age: 72
End: 2022-03-21
Payer: MEDICARE

## 2022-03-21 PROCEDURE — 93798 PHYS/QHP OP CAR RHAB W/ECG: CPT

## 2022-03-28 ENCOUNTER — APPOINTMENT (OUTPATIENT)
Dept: CARDIOLOGY | Facility: CLINIC | Age: 72
End: 2022-03-28
Payer: MEDICARE

## 2022-03-28 PROCEDURE — 93798 PHYS/QHP OP CAR RHAB W/ECG: CPT

## 2022-03-30 ENCOUNTER — APPOINTMENT (OUTPATIENT)
Dept: CARDIOLOGY | Facility: CLINIC | Age: 72
End: 2022-03-30
Payer: MEDICARE

## 2022-03-30 PROCEDURE — 93798 PHYS/QHP OP CAR RHAB W/ECG: CPT

## 2022-04-03 ENCOUNTER — EMERGENCY (EMERGENCY)
Facility: HOSPITAL | Age: 72
LOS: 1 days | Discharge: HOME | End: 2022-04-03
Attending: EMERGENCY MEDICINE
Payer: COMMERCIAL

## 2022-04-03 VITALS
DIASTOLIC BLOOD PRESSURE: 99 MMHG | WEIGHT: 106.04 LBS | OXYGEN SATURATION: 98 % | RESPIRATION RATE: 18 BRPM | SYSTOLIC BLOOD PRESSURE: 189 MMHG | HEIGHT: 60 IN | TEMPERATURE: 98 F | HEART RATE: 89 BPM

## 2022-04-03 DIAGNOSIS — Z98.49 CATARACT EXTRACTION STATUS, UNSPECIFIED EYE: Chronic | ICD-10-CM

## 2022-04-03 PROCEDURE — 99285 EMERGENCY DEPT VISIT HI MDM: CPT

## 2022-04-04 VITALS
RESPIRATION RATE: 16 BRPM | OXYGEN SATURATION: 100 % | SYSTOLIC BLOOD PRESSURE: 164 MMHG | HEART RATE: 67 BPM | TEMPERATURE: 98 F | DIASTOLIC BLOOD PRESSURE: 71 MMHG

## 2022-04-04 LAB
ALBUMIN SERPL ELPH-MCNC: 4.1 G/DL — SIGNIFICANT CHANGE UP (ref 3.3–5)
ALP SERPL-CCNC: 69 U/L — SIGNIFICANT CHANGE UP (ref 40–120)
ALT FLD-CCNC: 14 U/L — SIGNIFICANT CHANGE UP (ref 10–45)
ANION GAP SERPL CALC-SCNC: 12 MMOL/L — SIGNIFICANT CHANGE UP (ref 5–17)
APTT BLD: 35.3 SEC — SIGNIFICANT CHANGE UP (ref 27.5–35.5)
AST SERPL-CCNC: 18 U/L — SIGNIFICANT CHANGE UP (ref 10–40)
BASOPHILS # BLD AUTO: 0.04 K/UL — SIGNIFICANT CHANGE UP (ref 0–0.2)
BASOPHILS NFR BLD AUTO: 0.6 % — SIGNIFICANT CHANGE UP (ref 0–2)
BILIRUB SERPL-MCNC: 0.4 MG/DL — SIGNIFICANT CHANGE UP (ref 0.2–1.2)
BUN SERPL-MCNC: 36 MG/DL — HIGH (ref 7–23)
CALCIUM SERPL-MCNC: 9.1 MG/DL — SIGNIFICANT CHANGE UP (ref 8.4–10.5)
CHLORIDE SERPL-SCNC: 96 MMOL/L — SIGNIFICANT CHANGE UP (ref 96–108)
CO2 SERPL-SCNC: 24 MMOL/L — SIGNIFICANT CHANGE UP (ref 22–31)
CREAT SERPL-MCNC: 1.03 MG/DL — SIGNIFICANT CHANGE UP (ref 0.5–1.3)
EGFR: 57 ML/MIN/1.73M2 — LOW
EOSINOPHIL # BLD AUTO: 0.12 K/UL — SIGNIFICANT CHANGE UP (ref 0–0.5)
EOSINOPHIL NFR BLD AUTO: 1.8 % — SIGNIFICANT CHANGE UP (ref 0–6)
GLUCOSE SERPL-MCNC: 115 MG/DL — HIGH (ref 70–99)
HCT VFR BLD CALC: 27.7 % — LOW (ref 34.5–45)
HGB BLD-MCNC: 9.2 G/DL — LOW (ref 11.5–15.5)
IMM GRANULOCYTES NFR BLD AUTO: 0.5 % — SIGNIFICANT CHANGE UP (ref 0–1.5)
INR BLD: 1.12 RATIO — SIGNIFICANT CHANGE UP (ref 0.88–1.16)
LYMPHOCYTES # BLD AUTO: 2.08 K/UL — SIGNIFICANT CHANGE UP (ref 1–3.3)
LYMPHOCYTES # BLD AUTO: 32 % — SIGNIFICANT CHANGE UP (ref 13–44)
MAGNESIUM SERPL-MCNC: 1.5 MG/DL — LOW (ref 1.6–2.6)
MCHC RBC-ENTMCNC: 29.6 PG — SIGNIFICANT CHANGE UP (ref 27–34)
MCHC RBC-ENTMCNC: 33.2 GM/DL — SIGNIFICANT CHANGE UP (ref 32–36)
MCV RBC AUTO: 89.1 FL — SIGNIFICANT CHANGE UP (ref 80–100)
MONOCYTES # BLD AUTO: 0.61 K/UL — SIGNIFICANT CHANGE UP (ref 0–0.9)
MONOCYTES NFR BLD AUTO: 9.4 % — SIGNIFICANT CHANGE UP (ref 2–14)
NEUTROPHILS # BLD AUTO: 3.61 K/UL — SIGNIFICANT CHANGE UP (ref 1.8–7.4)
NEUTROPHILS NFR BLD AUTO: 55.7 % — SIGNIFICANT CHANGE UP (ref 43–77)
NRBC # BLD: 0 /100 WBCS — SIGNIFICANT CHANGE UP (ref 0–0)
PHOSPHATE SERPL-MCNC: 3.4 MG/DL — SIGNIFICANT CHANGE UP (ref 2.5–4.5)
PLATELET # BLD AUTO: 262 K/UL — SIGNIFICANT CHANGE UP (ref 150–400)
POTASSIUM SERPL-MCNC: 3.7 MMOL/L — SIGNIFICANT CHANGE UP (ref 3.5–5.3)
POTASSIUM SERPL-SCNC: 3.7 MMOL/L — SIGNIFICANT CHANGE UP (ref 3.5–5.3)
PROT SERPL-MCNC: 7.1 G/DL — SIGNIFICANT CHANGE UP (ref 6–8.3)
PROTHROM AB SERPL-ACNC: 12.9 SEC — SIGNIFICANT CHANGE UP (ref 10.5–13.4)
RBC # BLD: 3.11 M/UL — LOW (ref 3.8–5.2)
RBC # FLD: 13.2 % — SIGNIFICANT CHANGE UP (ref 10.3–14.5)
SODIUM SERPL-SCNC: 132 MMOL/L — LOW (ref 135–145)
WBC # BLD: 6.49 K/UL — SIGNIFICANT CHANGE UP (ref 3.8–10.5)
WBC # FLD AUTO: 6.49 K/UL — SIGNIFICANT CHANGE UP (ref 3.8–10.5)

## 2022-04-04 PROCEDURE — G1004: CPT

## 2022-04-04 PROCEDURE — 85025 COMPLETE CBC W/AUTO DIFF WBC: CPT

## 2022-04-04 PROCEDURE — 85610 PROTHROMBIN TIME: CPT

## 2022-04-04 PROCEDURE — 83735 ASSAY OF MAGNESIUM: CPT

## 2022-04-04 PROCEDURE — 99284 EMERGENCY DEPT VISIT MOD MDM: CPT | Mod: 25

## 2022-04-04 PROCEDURE — 85730 THROMBOPLASTIN TIME PARTIAL: CPT

## 2022-04-04 PROCEDURE — 96374 THER/PROPH/DIAG INJ IV PUSH: CPT

## 2022-04-04 PROCEDURE — 70450 CT HEAD/BRAIN W/O DYE: CPT | Mod: MF

## 2022-04-04 PROCEDURE — 80053 COMPREHEN METABOLIC PANEL: CPT

## 2022-04-04 PROCEDURE — 84100 ASSAY OF PHOSPHORUS: CPT

## 2022-04-04 PROCEDURE — 70450 CT HEAD/BRAIN W/O DYE: CPT | Mod: 26,MF

## 2022-04-04 RX ORDER — MAGNESIUM SULFATE 500 MG/ML
2 VIAL (ML) INJECTION ONCE
Refills: 0 | Status: COMPLETED | OUTPATIENT
Start: 2022-04-04 | End: 2022-04-04

## 2022-04-04 RX ORDER — ACETAMINOPHEN 500 MG
650 TABLET ORAL ONCE
Refills: 0 | Status: COMPLETED | OUTPATIENT
Start: 2022-04-04 | End: 2022-04-04

## 2022-04-04 RX ADMIN — Medication 50 GRAM(S): at 03:00

## 2022-04-04 RX ADMIN — Medication 650 MILLIGRAM(S): at 02:19

## 2022-04-04 NOTE — ED PROVIDER NOTE - NS ED ROS FT
GENERAL: No fever or chills, EYES: no change in vision, HEENT: no trouble swallowing or speaking, CARDIAC: no chest pain, PULMONARY: no cough or SOB, GI: no abdominal pain, no nausea, no vomiting, no diarrhea or constipation, : No changes in urination, SKIN: no rashes, NEURO: +headache,  MSK: No joint pain     All other ROS negative unless otherwise specified in HPI.     ~Jagdish Page M.D. Resident

## 2022-04-04 NOTE — ED PROVIDER NOTE - CHIEF COMPLAINT
Next appt 6-9-21  Last appt 12-8-20    Refill request for/Last refilled info;  fluoxetine (PROZAC) 40 MG capsule 90 capsule 1 8/12/2020     Sig: TAKE ONE CAPSULE BY MOUTH EVERY DAY      ondansetron (ZOFRAN ODT) 4 MG disintegrating tablet 20 tablet 0 3/22/2021     Sig: DISSOLVE ONE TABLET UNDER THE TONGUE EVERY 8 HOURS FOR NAUSEA      Refill unable to be completed per standing protocol due to; Non protocol med    Orders pended, and routed to provider for approval.   Please route any notes back to your nursing pool via patient call NOT Rx Auth.  Thank you, Refill Center Staff       The patient is a 71y Female complaining of headache.

## 2022-04-04 NOTE — ED PROVIDER NOTE - PATIENT PORTAL LINK FT
You can access the FollowMyHealth Patient Portal offered by [facility] by registering at the following website: [email or web address]. By joining [employer]’s FollowMyHealth portal, you will also be able to view your health information using other applications (apps) compatible with our system.

## 2022-04-04 NOTE — ED ADULT NURSE NOTE - OBJECTIVE STATEMENT
71 y.o female complaining of HTN and headache. Pt A&Ox3 . States HA started around 2000 last night. Pt took HTN meds as prescribed. Denies change in vision, NVD, recent fevers, CP, SOB, numbness/tingling in extremities, weakness, edema, dizziness. Pt strength x4 extremities, speech clear. PMH DM, HTN, HLD, s/p CABG (10/2021) on Plavix. Side rails up x2, safety and comfort maintained

## 2022-04-04 NOTE — ED PROVIDER NOTE - PROGRESS NOTE DETAILS
Mag repleted, HA resolved, pt agreeable to discharge. Discussed results of workup, importance of follow-up with cardiologist for HTN med adjustment, and return precautions with patient who verbalized understanding and agreement. Pt had opportunity to ask questions and address concerns, and results of workup including lab and imaging were provided in DC paperwork. Patient is medically clear for DC at this time. -Jagdish Page, PGY-1

## 2022-04-04 NOTE — ED PROVIDER NOTE - ATTENDING CONTRIBUTION TO CARE
I saw and evaluated patient with resident. I discussed H+P and MDM with resident. I agree with the statements made by the resident and have made any relevant edits on the note above. Multiple diagnoses were considered during patient's evaluation today. While exact etiology of symptoms is unclear, there appears to be no emergent process today that would require further emergent or inpatient management. Pt is safe for dc with outpt f/u and return instructions if symptoms worsen.

## 2022-04-04 NOTE — ED PROVIDER NOTE - CARE PLAN
Principal Discharge DX:	Headache   1 Principal Discharge DX:	Headache  Secondary Diagnosis:	Hypertension

## 2022-04-04 NOTE — ED PROVIDER NOTE - OBJECTIVE STATEMENT
71yoF w/Hx of CAD s/p triple bypass surgery on plavix and asa, DM, HTN, HLD p/w HA. HA started around 9pm, described as bilateral temporal "throbbing" 6/10 on pain scale, pt noted BP to be elevated at that time so she came to ED for further evaluation. HA is now 3-4/10 on initial presentation, pt did not trial any medications for pain. Similar HA to previous in the past. She denies visual changes, hearing changes, cough/sore throat/congestion, SOB, pain with breathing, CP, palpitations, back pain, abd pain, n/v/d/c, dysuria/freq/urg, hematuria/hematochezia/melena, numbness/tingling, focal weakness, generalized weakness, swelling, dizziness/lightheadedness, fevers/chills, no sick contacts, no recent travel.

## 2022-04-04 NOTE — ED PROVIDER NOTE - CLINICAL SUMMARY MEDICAL DECISION MAKING FREE TEXT BOX
71yoF w/Hx of CAD s/p triple bypass surgery on plavix and asa, DM, HTN, HLD p/w HA. VSS, phys exam unremarkable. Will eval for stroke given risk factors and blood thinners, check basic labs and electrolytes, tylenol for pain and DCTH if workup negative. - Jagdish Page, PGY-1 71yoF w/Hx of CAD s/p triple bypass surgery on plavix and asa, DM, HTN, HLD p/w HA. VSS, phys exam unremarkable. Will eval for stroke given risk factors and blood thinners, check basic labs and electrolytes, tylenol for pain and DCTH if workup negative. - Jagdish Page, PGY-1    Dr. Leonard's Note: pt shows no focal neurologic deficits, low concern for CVA or ACS. will obtain w/u as above to r/o acute pathology, re-evaluate and ensure improvement of BP. likely d/c if w/u is neg.

## 2022-04-04 NOTE — ED PROVIDER NOTE - NSFOLLOWUPINSTRUCTIONS_ED_ALL_ED_FT
Please follow up with your cardiologist within 1 week. Bring copies of your results with you (provided in your discharge paperwork).     You may take 500-1000 mg acetaminophen (tylenol) every 6 hours, as needed for pain.  --  You were seen and evaluated in the Emergency Department for your headache.     Clinical examination and history demonstrated no acute evidence of any life-threatening risks to your health as a result of your headache. You received medication in the Emergency Department for your headache.     While emergent evaluation does not demonstrate any immediate life-threats, we strongly recommend you follow up with a Neurologist for further evaluation of your headache symptoms.     Should you develop nausea, vomiting, worsening headaches, inability to walk properly, numbness or tingling in the extremities, dizziness, or changes to your hearing/vision - please immediately return to the Emergency Department for evaluation of your symptoms.     If you do not have a Neurologist, you can call the following number to schedule an appointment with our Neurology partners:    [facility]  Neurology  [address] [place] [address] [address] [place] [address]  Phone: [phone]    Furthermore we recommend you see your Primary Care Physician for a comprehensive evaluation of your health within the next 48-72 hours.

## 2022-04-04 NOTE — ED PROVIDER NOTE - PHYSICAL EXAMINATION
Gen: AAOx3, non-toxic, WDWN elderly woman lying in bed in NAD  Head: NCAT  HEENT: EOMI, PERRLA, oral mucosa moist, normal conjunctiva  Lung: CTAB, no respiratory distress, no wheezes/rhonchi/rales B/L, speaking in full sentences  CV: RRR, no murmurs, rubs or gallops  Abd: soft, NTND, no guarding, no CVA tenderness  MSK: no visible deformities  Neuro: No focal sensory or motor deficits; CNs II-XII intact, SILT throughout, strength 5/5 throughout, FNF WNL, no pronator drift, gait WNL  Skin: Warm, well perfused, no rash  Psych: pleasant, normal affect.   ~Jagdish Page M.D. Resident

## 2022-04-06 ENCOUNTER — APPOINTMENT (OUTPATIENT)
Dept: CARDIOLOGY | Facility: CLINIC | Age: 72
End: 2022-04-06
Payer: MEDICARE

## 2022-04-06 PROCEDURE — 93798 PHYS/QHP OP CAR RHAB W/ECG: CPT

## 2022-04-10 ENCOUNTER — NON-APPOINTMENT (OUTPATIENT)
Age: 72
End: 2022-04-10

## 2022-04-11 ENCOUNTER — APPOINTMENT (OUTPATIENT)
Dept: CARDIOLOGY | Facility: CLINIC | Age: 72
End: 2022-04-11
Payer: MEDICARE

## 2022-04-11 PROCEDURE — 93798 PHYS/QHP OP CAR RHAB W/ECG: CPT

## 2022-04-11 NOTE — HISTORY OF PRESENT ILLNESS
[Type II Diabetes] : Type II Diabetes [Yes ___ How many times per day?] : Yes, [unfilled] times per day [Is Patient aware of signs and symptoms of hypoglycemia and hyperglycemia?] : patient is aware of signs and symptoms of hypoglycemia and hyperglycemia [Does Patient follow with other health care specialists for comprehensive diabetes care?] : Does Patient follow with other health care specialists for comprehensive diabetes care? Yes [Medication Adherence] : medication adherence [Home monitoring of blood sugar] : home monitoring of blood sugar [Following diabetes way of eating] : following diabetes way of eating [Individualized exercise program as developed at cardiac rehabilitation] : individualized exercise program as developed at cardiac rehabilitation [Overview of diabetes and cardiovascular disease] : overview of diabetes and cardiovascular disease [Diagnosis of prediabetes, diabetes] : diagnosis of prediabetes, diabetes [Hypoglycemia and Hyperglycemia: sign and symptoms] : Hypoglycemia and Hyperglycemia: sign and symptoms [Role of lifestyle behaviors in diabetes management] : role of lifestyle behaviors in diabetes management [Provide [facility] Diabetes Management brochure or website link to patient] : provide [email or web address] Diabetes Management brochure or website link to patient [Yes, continue with Diabetes plan] : Yes, continue with Diabetes plan [None] : none [CABG] : CABG [HLD] : HLD [Diabetes Mellitus] : diabetes mellitus [Sedentary] : sedentary [BP: ____ mmHg] : BP: [unfilled] mmHg [HR: ____ bpm] : BP: [unfilled] bpm [O2sat: ____ %] : O2sat: [unfilled] % [RPE: ____] : RPE: [unfilled]  [METS: ____] : METS: [unfilled]  [Facility-based] : Facility-based [___ Days per week] : [unfilled] days per week [>= 31 minutes per session] : >= 31 minutes per session [Treadmill] : treadmill [Recumbent bike] : recumbent bike [BioStep] : BioStep [Upper body ergometer] : upper body ergometer [Stair Climber] : stair climber [Walking] : walking [Assess in ___ weeks] : assess in [unfilled] weeks [8 - 15 repetitions] : 8 - 15 repetitions [1 - 3 sets] : 1 - 3 sets [Perform aerobic activity for ___ consecutive minutes] : perform aerobic activity for [unfilled] consecutive minutes [To start resistance training] : to start resistance training [Exercise Benefits/Guidelines education] : exercise benefits/guidelines education [Individualized Exercise Rx] : individualized exercise Rx [Signs/Symptoms to report] : signs/symptoms to report [Home exercise] : home exercise [Yes, per exercise prescription, policy] : Yes, per exercise prescription, policy [PHQ-9: ___] : PHQ-9: [unfilled] [[facility] Total: ___] : [facility] score total: [unfilled] [Feelings Score: ___] : Feelings Score: [unfilled] [Physical Fitness Score: ____] : Physical Fitness Score: [unfilled] [Daily Activities Score: ___] : Daily Activities Score: [unfilled] [Social Activities Score: ___] : Social Activities Score: [unfilled] [Pain Score: ___] : Pain Score: [unfilled] [Overall Health Score: ___] : Overall Health Score: [unfilled] [Change in Health Score: ___] : Change in Health Score: [unfilled] [Quality of Life Score: ___] : Quality of Life Score: [unfilled] [Social Support Score: ___] : Social Support Score: [unfilled] [Has the patient given CR team permission to speak with the emergency  about the patient?] : Has the patient given CR team permission to speak with the emergency  about the patient? Yes [Patient will include healthy emotional coping practices in everyday life, such as: ____] : Patient will include healthy emotional coping practices in everyday life, such as [unfilled] [Meditation] : meditation [Relaxation breathing techniques] : relaxation breathing techniques [Yes, continue with psychosocial plan] : Yes, continue with psychosocial plan [Action] : Stage of change: Action [Rate your plate score: ____] : Rate your plate score: [unfilled] [Hypertension] : Hypertension [Diabetes] : Diabetes [Sugar] : sugar [Sodium] : sodium [Is patient on medication for hyperlipidemia?] : Is patient on medication for hyperlipidemia? Yes [Atorvastatin] : atorvastatin [Is Patient adherent with medication?] : patient is adherent with medication [Self] : self [Significant other] : significant other [Patient will include healthy diet pattern approaches to healthy eating] : Patient will include healthy diet pattern approaches to healthy eating [Self-reports of improved health eating patterns] : Self-reports of improved health eating patterns [Discuss an overview of healthy eating plan] : Discuss an overview of healthy eating plan [DASH Diet] : DASH diet [Mediterranean Diet] : Mediterranean diet [MyPlate.gov] : MyPlate.gov [Teach back utilized] : teach back utilized [CareNotes written material provided] : CareNotes written material provided [Yes, continue with nutrition plan] : Yes, continue with nutrition plan [In progress] : in progress [FreeTextEntry2] : 113 [FreeTextEntry3] : 110-130 [FreeTextEntry4] : Janumet and glipizide [Angina with exercise] : no angina with exercise [Fall Risk] : no fall risk [] : no [FreeTextEntry1] : Izabel Stein [FreeTextEntry5] : Eric Edmond [de-identified] : Pt invited to attend weekly virtual session, declined. [de-identified] : METS on RB increased from 3.3 to 3.9 since last ITP\par METS on TM increased from 2.8 to 3.6 since last ITP [FreeTextEntry6] : Pt. denies barriers to her emotional well-being. States she worries at times but, she is doing much better.  She is a devoted Yarsanism/Decon, states her Yazidism family and intermediate family are very supportive.  [Patient has seen registered dietitian in the past?] : Patient has seen registered dietitian in the past? No [Patient is interested in seeing a registered dietitian?] : Patient is interested in seeing a registered dietitian? No [FreeTextEntry8] : Goals:\par Pt. will increase lean meat intake to 2-3 times per week\par Pt. will increase whole grain intake to 2-3 times per week\par Pt. will decrease rice/past intake to 0-1 time per week [FreeTextEntry7] : Pt. reports decrease appetite and 19 lbs unintentional weight loss since surgery. States she recently included Glucerna shakes 1-2 times daily.  She noticed her appetite is slowly returning.  \par Weight: 106.5 lbs.

## 2022-04-13 ENCOUNTER — APPOINTMENT (OUTPATIENT)
Dept: CARDIOLOGY | Facility: CLINIC | Age: 72
End: 2022-04-13
Payer: MEDICARE

## 2022-04-13 PROCEDURE — 93798 PHYS/QHP OP CAR RHAB W/ECG: CPT

## 2022-04-18 ENCOUNTER — APPOINTMENT (OUTPATIENT)
Dept: CARDIOLOGY | Facility: CLINIC | Age: 72
End: 2022-04-18
Payer: MEDICARE

## 2022-04-18 PROCEDURE — 93798 PHYS/QHP OP CAR RHAB W/ECG: CPT

## 2022-04-20 ENCOUNTER — APPOINTMENT (OUTPATIENT)
Dept: CARDIOLOGY | Facility: CLINIC | Age: 72
End: 2022-04-20
Payer: MEDICARE

## 2022-04-20 PROCEDURE — 93798 PHYS/QHP OP CAR RHAB W/ECG: CPT

## 2022-04-25 ENCOUNTER — APPOINTMENT (OUTPATIENT)
Dept: CARDIOLOGY | Facility: CLINIC | Age: 72
End: 2022-04-25
Payer: MEDICARE

## 2022-04-25 PROCEDURE — 93798 PHYS/QHP OP CAR RHAB W/ECG: CPT

## 2022-04-27 ENCOUNTER — APPOINTMENT (OUTPATIENT)
Dept: CARDIOLOGY | Facility: CLINIC | Age: 72
End: 2022-04-27
Payer: MEDICARE

## 2022-04-27 PROCEDURE — 93798 PHYS/QHP OP CAR RHAB W/ECG: CPT

## 2022-05-02 ENCOUNTER — APPOINTMENT (OUTPATIENT)
Dept: CARDIOLOGY | Facility: CLINIC | Age: 72
End: 2022-05-02
Payer: MEDICARE

## 2022-05-02 PROCEDURE — 93798 PHYS/QHP OP CAR RHAB W/ECG: CPT

## 2022-05-04 ENCOUNTER — APPOINTMENT (OUTPATIENT)
Dept: CARDIOLOGY | Facility: CLINIC | Age: 72
End: 2022-05-04
Payer: MEDICARE

## 2022-05-04 PROCEDURE — 93798 PHYS/QHP OP CAR RHAB W/ECG: CPT

## 2022-05-09 ENCOUNTER — APPOINTMENT (OUTPATIENT)
Dept: CARDIOLOGY | Facility: CLINIC | Age: 72
End: 2022-05-09
Payer: MEDICARE

## 2022-05-09 PROCEDURE — 93798 PHYS/QHP OP CAR RHAB W/ECG: CPT

## 2022-05-10 ENCOUNTER — NON-APPOINTMENT (OUTPATIENT)
Age: 72
End: 2022-05-10

## 2022-05-11 ENCOUNTER — APPOINTMENT (OUTPATIENT)
Dept: CARDIOLOGY | Facility: CLINIC | Age: 72
End: 2022-05-11
Payer: MEDICARE

## 2022-05-11 PROCEDURE — 93798 PHYS/QHP OP CAR RHAB W/ECG: CPT

## 2022-05-24 ENCOUNTER — APPOINTMENT (OUTPATIENT)
Dept: UROGYNECOLOGY | Facility: CLINIC | Age: 72
End: 2022-05-24
Payer: MEDICARE

## 2022-05-24 VITALS — TEMPERATURE: 96.8 F | SYSTOLIC BLOOD PRESSURE: 154 MMHG | HEART RATE: 78 BPM | DIASTOLIC BLOOD PRESSURE: 72 MMHG

## 2022-05-24 PROCEDURE — 99213 OFFICE O/P EST LOW 20 MIN: CPT

## 2022-05-25 NOTE — PHYSICAL EXAM
[No Acute Distress] : in no acute distress [Well developed] : well developed [Well Nourished] : ~L well nourished [Good Hygeine] : demonstrates good hygeine [Oriented x3] : oriented to person, place, and time [Normal Memory] : ~T memory was ~L unimpaired [Normal Mood/Affect] : mood and affect are normal [Warm and Dry] : was warm and dry to touch [Normal Gait] : gait was normal [Vulvar Atrophy] : vulvar atrophy [Labia Majora] : were normal [Labia Minora] : were normal [Normal] : was normal [Normal Appearance] : general appearance was normal [Atrophy] : atrophy [Cystocele] : a cystocele [Uterine Prolapse] : uterine prolapse [Discharge] : a  ~M vaginal discharge was present [Scant] : scant [No Bleeding] : there was no active vaginal bleeding [Laura] : yellow [Thin] : thin [Mass (___ Cm)] : no ~M [unfilled] abdominal mass was palpated [Tenderness] : ~T no ~M abdominal tenderness observed [Distended] : not distended

## 2022-05-25 NOTE — PROCEDURE
[Good Fit] : fits well [Discharge] : there is vaginal discharge [Pessary Inserted] : inserted [Pessary Washed] : washed [H2O] : H2O [None] : no bleeding [Medication Review] : Medicaiton Review: Patient verbalizes understanding of risks and benefits [Bowel Management] : Bowel Management: patient verbalizes understanding of daily dietary fiber intake [Refit] : refit is not needed [Erosion] : no evidence of erosion [Erythema] : no erythema [Infection] : no evidence of infection [FreeTextEntry1] : Sonal# 4 (2.50) [FreeTextEntry3] : Premarin cream [FreeTextEntry4] : Advised to prevent straining with BM by managing with OTC stool softeners as needed.  [FreeTextEntry8] : Reviewed [facility]

## 2022-05-25 NOTE — HISTORY OF PRESENT ILLNESS
[FreeTextEntry1] : aMy is a 72 y/o that presents to the office for follow up for pelvic prolapse. Prolapse is being managed with a Shaatz #4 (2.50). Patient is happy with the support provided by this pessary. Denies any pelvic pain, pressure or vaginal bleeding.  Denies any urinary complaints. She states she has been having constipation. She manages her constipation with fluids and prune juice. She is using Premarin cream twice a week.\par \par \par \par

## 2022-06-01 ENCOUNTER — NON-APPOINTMENT (OUTPATIENT)
Age: 72
End: 2022-06-01

## 2022-06-01 ENCOUNTER — APPOINTMENT (OUTPATIENT)
Dept: CARDIOLOGY | Facility: CLINIC | Age: 72
End: 2022-06-01
Payer: MEDICARE

## 2022-06-01 VITALS
HEART RATE: 60 BPM | TEMPERATURE: 97 F | SYSTOLIC BLOOD PRESSURE: 189 MMHG | OXYGEN SATURATION: 100 % | RESPIRATION RATE: 14 BRPM | BODY MASS INDEX: 21.2 KG/M2 | WEIGHT: 108 LBS | HEIGHT: 60 IN | DIASTOLIC BLOOD PRESSURE: 78 MMHG

## 2022-06-01 PROCEDURE — 93000 ELECTROCARDIOGRAM COMPLETE: CPT

## 2022-06-01 PROCEDURE — 99215 OFFICE O/P EST HI 40 MIN: CPT

## 2022-06-08 NOTE — PHYSICAL EXAM
[Well Developed] : well developed [Normal Conjunctiva] : normal conjunctiva [Normal Venous Pressure] : normal venous pressure [Normal Gait] : normal gait [No Rash] : no rash [Moves all extremities] : moves all extremities [de-identified] : 2 out of 6 ejection systolic murmur at the aortic area. [de-identified] : Lungs are clear chest wound seems to be healing well [de-identified] : Left thigh at the site of the surgical harvest site there is a little swelling under the surgical wound.

## 2022-06-08 NOTE — DISCUSSION/SUMMARY
[FreeTextEntry1] : 1 CAD, no angina at this time, continue with current medical therapy she is on aspirin and clopidogrel as she had NSTEMI, we will continue this for 12 months.  Aggressive risk factor modification.\par \par 2.  Mild segmental LV dysfunction on echocardiogram but overall ejection fraction is normal with clinically no evidence of congestive heart failure.  We will continue with current medical therapy, changing her metoprolol to carvedilol\par \par 3.  Hypertension: very elevated , but as per patient and family BP is better at home.  Patient brought in a blood pressure logs from home and blood pressure is better controlled at home less than 140/80, at this point we think that she does have element of whitecoat hypertension so recommend home blood pressure logs and titration of meds accordingly\par \par 4.  Aortic valve disease, at this time aortic valve disease mild to moderate, clinical follow-up, echocardiogram in 2 to 3 years.

## 2022-06-08 NOTE — CARDIOLOGY SUMMARY
[de-identified] : Sinus rhythm, LVH, repolarization changes [de-identified] : Ejection fraction 50 to 55%, aortic valve area 1.6 cm².

## 2022-07-21 NOTE — PHYSICAL EXAM
[No Acute Distress] : in no acute distress [Well developed] : well developed No [Well Nourished] : ~L well nourished [Good Hygeine] : demonstrates good hygeine [Oriented x3] : oriented to person, place, and time [Normal Memory] : ~T memory was ~L unimpaired [Normal Mood/Affect] : mood and affect are normal [Soft, Nontender] : the abdomen was soft and nontender [Warm and Dry] : was warm and dry to touch [Normal Gait] : gait was normal [Normal Appearance] : general appearance was normal [Atrophy] : atrophy [Cystocele] : a cystocele [Uterine Prolapse] : uterine prolapse [Discharge] : a  ~M vaginal discharge was present [White] : white [Scant] : there was scant vaginal bleeding [Normal] : normal [FreeTextEntry4] : mild irritation with some staining along vaginal wall rubbing from pessary. [de-identified] : + friable granulation tissue from 7:00-9:00 with + bleeding

## 2022-07-27 ENCOUNTER — NON-APPOINTMENT (OUTPATIENT)
Age: 72
End: 2022-07-27

## 2022-07-27 ENCOUNTER — APPOINTMENT (OUTPATIENT)
Dept: CARDIOLOGY | Facility: CLINIC | Age: 72
End: 2022-07-27

## 2022-07-27 VITALS
OXYGEN SATURATION: 100 % | BODY MASS INDEX: 20.22 KG/M2 | SYSTOLIC BLOOD PRESSURE: 183 MMHG | HEIGHT: 60 IN | HEART RATE: 55 BPM | DIASTOLIC BLOOD PRESSURE: 78 MMHG | WEIGHT: 103 LBS

## 2022-07-27 PROCEDURE — 99214 OFFICE O/P EST MOD 30 MIN: CPT | Mod: 25

## 2022-07-27 PROCEDURE — 93000 ELECTROCARDIOGRAM COMPLETE: CPT

## 2022-07-27 NOTE — CARDIOLOGY SUMMARY
[de-identified] : Sinus rhythm, LVH, repolarization changes [de-identified] : Ejection fraction 50 to 55%, aortic valve area 1.6 cm².

## 2022-07-27 NOTE — HISTORY OF PRESENT ILLNESS
[FreeTextEntry1] : 72 yr F HTN, s/p CABG\par last week had episode where she fell heaviness in her chest, also noted her blood pressure was very elevated.  Since then she has been active doing her daily chores no symptoms.  I suspect her symptoms

## 2022-07-27 NOTE — DISCUSSION/SUMMARY
[FreeTextEntry1] : 1 CAD I suspect her symptoms last week were related to hypertension.  Continue with current medical therapy she is on aspirin and clopidogrel as she had NSTEMI, we will continue this for 12 months.  Aggressive risk factor modification.\par \par 2.  Mild segmental LV dysfunction on echocardiogram but overall ejection fraction is normal with clinically no evidence of congestive heart failure.  We will continue with current medical therapy, changing her metoprolol to carvedilol\par \par 3.  Hypertension: I recommend that we add nifedipine 30 mg daily reassess her blood pressure in 2 weeks.\par \par 4.  Aortic valve disease, at this time aortic valve disease mild to moderate, clinical follow-up, echocardiogram in 2 to 3 years. [EKG obtained to assist in diagnosis and management of assessed problem(s)] : EKG obtained to assist in diagnosis and management of assessed problem(s)

## 2022-07-27 NOTE — PHYSICAL EXAM
[Well Developed] : well developed [Normal Conjunctiva] : normal conjunctiva [Normal Venous Pressure] : normal venous pressure [Normal Gait] : normal gait [No Rash] : no rash [Moves all extremities] : moves all extremities [de-identified] : 2 out of 6 ejection systolic murmur at the aortic area. [de-identified] : Lungs are clear chest wound seems to be healing well [de-identified] : Left thigh at the site of the surgical harvest site there is a little swelling under the surgical wound.

## 2022-08-01 ENCOUNTER — EMERGENCY (EMERGENCY)
Facility: HOSPITAL | Age: 72
LOS: 1 days | Discharge: HOME | End: 2022-08-01
Attending: EMERGENCY MEDICINE
Payer: COMMERCIAL

## 2022-08-01 VITALS
OXYGEN SATURATION: 100 % | HEART RATE: 75 BPM | SYSTOLIC BLOOD PRESSURE: 175 MMHG | HEIGHT: 60 IN | TEMPERATURE: 98 F | DIASTOLIC BLOOD PRESSURE: 88 MMHG | RESPIRATION RATE: 18 BRPM | WEIGHT: 102.07 LBS

## 2022-08-01 VITALS
HEART RATE: 63 BPM | RESPIRATION RATE: 17 BRPM | DIASTOLIC BLOOD PRESSURE: 68 MMHG | SYSTOLIC BLOOD PRESSURE: 177 MMHG | OXYGEN SATURATION: 100 % | TEMPERATURE: 98 F

## 2022-08-01 DIAGNOSIS — Z98.49 CATARACT EXTRACTION STATUS, UNSPECIFIED EYE: Chronic | ICD-10-CM

## 2022-08-01 LAB
ALBUMIN SERPL ELPH-MCNC: 4.2 G/DL — SIGNIFICANT CHANGE UP (ref 3.3–5)
ALP SERPL-CCNC: 77 U/L — SIGNIFICANT CHANGE UP (ref 40–120)
ALT FLD-CCNC: 9 U/L — LOW (ref 10–45)
ANION GAP SERPL CALC-SCNC: 10 MMOL/L — SIGNIFICANT CHANGE UP (ref 5–17)
APTT BLD: 34.2 SEC — SIGNIFICANT CHANGE UP (ref 27.5–35.5)
AST SERPL-CCNC: 18 U/L — SIGNIFICANT CHANGE UP (ref 10–40)
BASOPHILS # BLD AUTO: 0.04 K/UL — SIGNIFICANT CHANGE UP (ref 0–0.2)
BASOPHILS NFR BLD AUTO: 0.5 % — SIGNIFICANT CHANGE UP (ref 0–2)
BILIRUB SERPL-MCNC: 0.4 MG/DL — SIGNIFICANT CHANGE UP (ref 0.2–1.2)
BUN SERPL-MCNC: 49 MG/DL — HIGH (ref 7–23)
CALCIUM SERPL-MCNC: 9.5 MG/DL — SIGNIFICANT CHANGE UP (ref 8.4–10.5)
CHLORIDE SERPL-SCNC: 95 MMOL/L — LOW (ref 96–108)
CO2 SERPL-SCNC: 26 MMOL/L — SIGNIFICANT CHANGE UP (ref 22–31)
CREAT SERPL-MCNC: 1.43 MG/DL — HIGH (ref 0.5–1.3)
EGFR: 38 ML/MIN/1.73M2 — LOW
EOSINOPHIL # BLD AUTO: 0.14 K/UL — SIGNIFICANT CHANGE UP (ref 0–0.5)
EOSINOPHIL NFR BLD AUTO: 1.9 % — SIGNIFICANT CHANGE UP (ref 0–6)
FLUAV AG NPH QL: SIGNIFICANT CHANGE UP
FLUBV AG NPH QL: SIGNIFICANT CHANGE UP
GLUCOSE SERPL-MCNC: 103 MG/DL — HIGH (ref 70–99)
HCT VFR BLD CALC: 26.2 % — LOW (ref 34.5–45)
HGB BLD-MCNC: 8.7 G/DL — LOW (ref 11.5–15.5)
IMM GRANULOCYTES NFR BLD AUTO: 0.3 % — SIGNIFICANT CHANGE UP (ref 0–1.5)
INR BLD: 1.06 RATIO — SIGNIFICANT CHANGE UP (ref 0.88–1.16)
LYMPHOCYTES # BLD AUTO: 2.09 K/UL — SIGNIFICANT CHANGE UP (ref 1–3.3)
LYMPHOCYTES # BLD AUTO: 28.4 % — SIGNIFICANT CHANGE UP (ref 13–44)
MCHC RBC-ENTMCNC: 29.8 PG — SIGNIFICANT CHANGE UP (ref 27–34)
MCHC RBC-ENTMCNC: 33.2 GM/DL — SIGNIFICANT CHANGE UP (ref 32–36)
MCV RBC AUTO: 89.7 FL — SIGNIFICANT CHANGE UP (ref 80–100)
MONOCYTES # BLD AUTO: 0.81 K/UL — SIGNIFICANT CHANGE UP (ref 0–0.9)
MONOCYTES NFR BLD AUTO: 11 % — SIGNIFICANT CHANGE UP (ref 2–14)
NEUTROPHILS # BLD AUTO: 4.26 K/UL — SIGNIFICANT CHANGE UP (ref 1.8–7.4)
NEUTROPHILS NFR BLD AUTO: 57.9 % — SIGNIFICANT CHANGE UP (ref 43–77)
NRBC # BLD: 0 /100 WBCS — SIGNIFICANT CHANGE UP (ref 0–0)
PLATELET # BLD AUTO: 212 K/UL — SIGNIFICANT CHANGE UP (ref 150–400)
POTASSIUM SERPL-MCNC: 3.9 MMOL/L — SIGNIFICANT CHANGE UP (ref 3.5–5.3)
POTASSIUM SERPL-SCNC: 3.9 MMOL/L — SIGNIFICANT CHANGE UP (ref 3.5–5.3)
PROT SERPL-MCNC: 7.3 G/DL — SIGNIFICANT CHANGE UP (ref 6–8.3)
PROTHROM AB SERPL-ACNC: 12.3 SEC — SIGNIFICANT CHANGE UP (ref 10.5–13.4)
RBC # BLD: 2.92 M/UL — LOW (ref 3.8–5.2)
RBC # FLD: 13.7 % — SIGNIFICANT CHANGE UP (ref 10.3–14.5)
RSV RNA NPH QL NAA+NON-PROBE: SIGNIFICANT CHANGE UP
SARS-COV-2 RNA SPEC QL NAA+PROBE: SIGNIFICANT CHANGE UP
SODIUM SERPL-SCNC: 131 MMOL/L — LOW (ref 135–145)
TROPONIN T, HIGH SENSITIVITY RESULT: 11 NG/L — SIGNIFICANT CHANGE UP (ref 0–51)
TROPONIN T, HIGH SENSITIVITY RESULT: 12 NG/L — SIGNIFICANT CHANGE UP (ref 0–51)
WBC # BLD: 7.36 K/UL — SIGNIFICANT CHANGE UP (ref 3.8–10.5)
WBC # FLD AUTO: 7.36 K/UL — SIGNIFICANT CHANGE UP (ref 3.8–10.5)

## 2022-08-01 PROCEDURE — 80053 COMPREHEN METABOLIC PANEL: CPT

## 2022-08-01 PROCEDURE — 99285 EMERGENCY DEPT VISIT HI MDM: CPT

## 2022-08-01 PROCEDURE — 87637 SARSCOV2&INF A&B&RSV AMP PRB: CPT

## 2022-08-01 PROCEDURE — 71046 X-RAY EXAM CHEST 2 VIEWS: CPT | Mod: 26

## 2022-08-01 PROCEDURE — 71046 X-RAY EXAM CHEST 2 VIEWS: CPT

## 2022-08-01 PROCEDURE — 85025 COMPLETE CBC W/AUTO DIFF WBC: CPT

## 2022-08-01 PROCEDURE — 99285 EMERGENCY DEPT VISIT HI MDM: CPT | Mod: 25

## 2022-08-01 PROCEDURE — 85610 PROTHROMBIN TIME: CPT

## 2022-08-01 PROCEDURE — 83880 ASSAY OF NATRIURETIC PEPTIDE: CPT

## 2022-08-01 PROCEDURE — 84484 ASSAY OF TROPONIN QUANT: CPT

## 2022-08-01 PROCEDURE — 85730 THROMBOPLASTIN TIME PARTIAL: CPT

## 2022-08-01 NOTE — ED ADULT NURSE NOTE - OBJECTIVE STATEMENT
72 yr old female A&Ox4, presenting to the ED ambulatory complaining of hypertension. Pt states she took her BP pressure before taking her meds around 9:30pm and it was 120/63. Pt states she woke up around 2am with a 10/10 throbbing headache and heaviness in her chest, denies any N/V/D/SOB. Pt checked her BP and it was 170/90. Pt endorses getting 30mg of nifedipine prescribed on 7/28, states she took it once and it caused hypotension so she stopped taking it. PMH triple bypass 10/2021. Pt answers questions appropriately, breathing unlabored, abd soft. Safety and comfort measures provided, bed locked and in lowest position, side rails up for safety. Call bell within reach. Awaiting results. 72 yr old female A&Ox4, presenting to the ED ambulatory complaining of hypertension. Pt states she took her BP pressure before taking her meds around 9:30pm and it was 120/63. Pt states she woke up around 2am with a 10/10 throbbing headache and heaviness in her chest, denies any N/V/D/SOB. Pt checked her BP and it was 170/90. Pt endorses getting 30mg of nifedipine prescribed on 7/28, states she took it once and it caused hypotension so she stopped taking it. PMH triple bypass 10/2021. Pt answers questions appropriately, breathing unlabored, abd soft. Safety and comfort measures provided, bed locked and in lowest position, side rails up for safety. Pt placed on cardiac monitor. Awaiting results.

## 2022-08-01 NOTE — ED PROVIDER NOTE - OBJECTIVE STATEMENT
71yoF w/Hx of CAD s/p triple bypass surgery on plavix and asa, DM, HTN, HLD p/w concerns for 71yoF w/Hx of CAD s/p triple bypass surgery on plavix and asa, DM, HTN, HLD coming in w/ chest pain that began this morning. stated pain was a chest pressure and worsened with exertion. Checked BP and it was 170/90, prompting patient to come to the ED. Patient also noted a headache at the same time. Denies any weakness, difficulty ambulating, nausea or dizziness.

## 2022-08-01 NOTE — ED PROVIDER NOTE - NS ED ROS FT
General: denies fever, chills  HENT: denies nasal congestion, rhinorrhea  Eyes: denies visual changes, blurred vision  CV: chest pain  Resp: denies difficulty breathing, cough  Abdominal: denies nausea, vomiting, diarrhea, abdominal pain  : denies urinary pain or discharge  MSK: denies muscle aches, leg swelling  Neuro: headache  Skin: denies rashes, bruises

## 2022-08-01 NOTE — ED PROVIDER NOTE - PROGRESS NOTE DETAILS
Rafy, PGY3: Awaiting repeat trop; discussed w/ patient option of CDU/admission but patient requests outpatient follow up. Believes she can follow up within the next 48 hours. Attempted to reach cardiologist Dr. cristina ayala; no answer but voicemail left. Will await repeat trop and reassess Alberto Jolly MD (Attending Physician): I received sign out from Dr. Desouza .    71F with PMHx CAD s/p CABG on plavix, ASA presenting for chest pain at 2A. Currently asymptomatic. Initial trop 11, awaiting repeat troponin. Discussed admission vs CDU but wants to go home since has good f/u with cards and recently saw cards 3 days ago. Percy Medeiros MD (PGY2): Delta trop wnl. Patient w/o chest pain at this time. Called Dr. Milner office, not able to discuss with tahira but left message with his team. Will have rapid follow-up appt. strict return precautions discussed.

## 2022-08-01 NOTE — ED PROVIDER NOTE - NSFOLLOWUPINSTRUCTIONS_ED_ALL_ED_FT
Discharge instructions:    - Please follow up with your Cardiologist within the next 24-72 hours    - Tylenol up to 650 mg every 4-6 hours as needed for pain. Take any prescribed medications as instructed:       SEEK IMMEDIATE MEDICAL CARE IF YOU HAVE ANY OF THE FOLLOWING SYMPTOMS: worsening chest pain, coughing up blood, unexplained back/neck/jaw pain, severe abdominal pain, dizziness or lightheadedness, fainting, shortness of breath, sweaty or clammy skin, vomiting, or racing heart beat. These symptoms may represent a serious problem that is an emergency. Do not wait to see if the symptoms will go away. Get medical help right away. Call 911 and do not drive yourself to the hospital.

## 2022-08-01 NOTE — ED PROVIDER NOTE - ATTENDING CONTRIBUTION TO CARE
Dr. Desouza (Attending Physician)  I performed a history and physical exam of the patient and discussed their management with the resident. I reviewed the resident's note and agree with the documented findings and plan of care. My medical decision making and observations are found above.

## 2022-08-01 NOTE — ED PROVIDER NOTE - PATIENT PORTAL LINK FT
You can access the FollowMyHealth Patient Portal offered by [facility] by registering at the following website: [email or web address]. By joining [employer]’s FollowMyHealth portal, you will also be able to view your health information using other applications (apps) compatible with our system.

## 2022-08-01 NOTE — ED PROVIDER NOTE - CLINICAL SUMMARY MEDICAL DECISION MAKING FREE TEXT BOX
71yoF w/Hx of CAD s/p triple bypass surgery on plavix and asa, DM, HTN, HLD coming in w/ chest pain ; concern for ACS vs unstable angina; will assess labs, eKG, chest xray 71yoF w/Hx of CAD s/p triple bypass surgery on plavix and asa, DM, HTN, HLD coming in w/ chest pain ; concern for ACS vs unstable angina; will assess labs, eKG, chest xray    Dr. Desouza (Attending Physician)

## 2022-08-01 NOTE — ED PROVIDER NOTE - PHYSICAL EXAMINATION
GENERAL: well appearing in no acute distress, non-toxic appearing  HEAD: normocephalic, atraumatic  HENT: airway intact, neck supple  EYES: normal conjunctiva  CARDIAC: regular rate and rhythm, normal S1S2, no appreciable murmurs, 2+ pulses in UE/LE b/l  PULM: normal breath sounds, clear to ascultation bilaterally, no rales, rhonchi, wheezing  GI: abdomen nondistended, soft, nontender, no guarding, rebound tenderness  NEURO: no focal motor or sensory deficits, CN2-12 intact, normal speech; AAOx3  MSK: no peripheral edema  SKIN: well-perfused, extremities warm, no visible rashes

## 2022-08-03 ENCOUNTER — APPOINTMENT (OUTPATIENT)
Dept: CARDIOLOGY | Facility: CLINIC | Age: 72
End: 2022-08-03

## 2022-08-03 VITALS
HEIGHT: 60 IN | HEART RATE: 63 BPM | OXYGEN SATURATION: 99 % | BODY MASS INDEX: 20.03 KG/M2 | SYSTOLIC BLOOD PRESSURE: 167 MMHG | WEIGHT: 102 LBS | DIASTOLIC BLOOD PRESSURE: 60 MMHG

## 2022-08-03 PROCEDURE — 93000 ELECTROCARDIOGRAM COMPLETE: CPT

## 2022-08-03 PROCEDURE — 99214 OFFICE O/P EST MOD 30 MIN: CPT | Mod: 25

## 2022-08-04 RX ORDER — NIFEDIPINE 30 MG/1
30 TABLET, EXTENDED RELEASE ORAL DAILY
Qty: 30 | Refills: 0 | Status: DISCONTINUED | COMMUNITY
Start: 2022-07-27 | End: 2022-08-04

## 2022-08-04 RX ORDER — DOXYCYCLINE 100 MG/1
100 CAPSULE ORAL
Qty: 14 | Refills: 0 | Status: DISCONTINUED | COMMUNITY
Start: 2022-06-27

## 2022-08-04 RX ORDER — SITAGLIPTIN 100 MG/1
100 TABLET, FILM COATED ORAL
Qty: 90 | Refills: 0 | Status: DISCONTINUED | COMMUNITY
Start: 2022-06-24

## 2022-08-08 NOTE — ED ADULT NURSE NOTE - EXTENSIONS OF SELF_ADULT
Spoke with patient advised that Dr. Lincoln Weiss had signed letter clearing him to drive [email or web address]. Patient request letter be mailed. Confirmed mailing address and mailed letter.
None

## 2022-08-12 ENCOUNTER — EMERGENCY (EMERGENCY)
Facility: HOSPITAL | Age: 72
LOS: 1 days | Discharge: HOME | End: 2022-08-12
Attending: EMERGENCY MEDICINE
Payer: COMMERCIAL

## 2022-08-12 VITALS
HEIGHT: 60 IN | RESPIRATION RATE: 18 BRPM | SYSTOLIC BLOOD PRESSURE: 145 MMHG | OXYGEN SATURATION: 100 % | WEIGHT: 102.96 LBS | DIASTOLIC BLOOD PRESSURE: 55 MMHG | TEMPERATURE: 98 F | HEART RATE: 63 BPM

## 2022-08-12 DIAGNOSIS — Z98.49 CATARACT EXTRACTION STATUS, UNSPECIFIED EYE: Chronic | ICD-10-CM

## 2022-08-12 PROCEDURE — 93010 ELECTROCARDIOGRAM REPORT: CPT

## 2022-08-12 PROCEDURE — 99284 EMERGENCY DEPT VISIT MOD MDM: CPT

## 2022-08-13 VITALS
RESPIRATION RATE: 16 BRPM | HEART RATE: 72 BPM | OXYGEN SATURATION: 100 % | SYSTOLIC BLOOD PRESSURE: 137 MMHG | TEMPERATURE: 98 F | DIASTOLIC BLOOD PRESSURE: 87 MMHG

## 2022-08-13 LAB
ALBUMIN SERPL ELPH-MCNC: 4.4 G/DL — SIGNIFICANT CHANGE UP (ref 3.3–5)
ALP SERPL-CCNC: 92 U/L — SIGNIFICANT CHANGE UP (ref 40–120)
ALT FLD-CCNC: 11 U/L — SIGNIFICANT CHANGE UP (ref 10–45)
ANION GAP SERPL CALC-SCNC: 13 MMOL/L — SIGNIFICANT CHANGE UP (ref 5–17)
AST SERPL-CCNC: 18 U/L — SIGNIFICANT CHANGE UP (ref 10–40)
BASOPHILS # BLD AUTO: 0.03 K/UL — SIGNIFICANT CHANGE UP (ref 0–0.2)
BASOPHILS NFR BLD AUTO: 0.5 % — SIGNIFICANT CHANGE UP (ref 0–2)
BILIRUB SERPL-MCNC: 0.4 MG/DL — SIGNIFICANT CHANGE UP (ref 0.2–1.2)
BUN SERPL-MCNC: 52 MG/DL — HIGH (ref 7–23)
CALCIUM SERPL-MCNC: 9.6 MG/DL — SIGNIFICANT CHANGE UP (ref 8.4–10.5)
CHLORIDE SERPL-SCNC: 90 MMOL/L — LOW (ref 96–108)
CO2 SERPL-SCNC: 26 MMOL/L — SIGNIFICANT CHANGE UP (ref 22–31)
CREAT SERPL-MCNC: 1.36 MG/DL — HIGH (ref 0.5–1.3)
EGFR: 41 ML/MIN/1.73M2 — LOW
EOSINOPHIL # BLD AUTO: 0.1 K/UL — SIGNIFICANT CHANGE UP (ref 0–0.5)
EOSINOPHIL NFR BLD AUTO: 1.8 % — SIGNIFICANT CHANGE UP (ref 0–6)
FLUAV AG NPH QL: SIGNIFICANT CHANGE UP
FLUBV AG NPH QL: SIGNIFICANT CHANGE UP
GLUCOSE SERPL-MCNC: 120 MG/DL — HIGH (ref 70–99)
HCT VFR BLD CALC: 27.3 % — LOW (ref 34.5–45)
HGB BLD-MCNC: 9.2 G/DL — LOW (ref 11.5–15.5)
IMM GRANULOCYTES NFR BLD AUTO: 0.2 % — SIGNIFICANT CHANGE UP (ref 0–1.5)
LYMPHOCYTES # BLD AUTO: 2 K/UL — SIGNIFICANT CHANGE UP (ref 1–3.3)
LYMPHOCYTES # BLD AUTO: 35.1 % — SIGNIFICANT CHANGE UP (ref 13–44)
MCHC RBC-ENTMCNC: 29.5 PG — SIGNIFICANT CHANGE UP (ref 27–34)
MCHC RBC-ENTMCNC: 33.7 GM/DL — SIGNIFICANT CHANGE UP (ref 32–36)
MCV RBC AUTO: 87.5 FL — SIGNIFICANT CHANGE UP (ref 80–100)
MONOCYTES # BLD AUTO: 0.59 K/UL — SIGNIFICANT CHANGE UP (ref 0–0.9)
MONOCYTES NFR BLD AUTO: 10.4 % — SIGNIFICANT CHANGE UP (ref 2–14)
NEUTROPHILS # BLD AUTO: 2.97 K/UL — SIGNIFICANT CHANGE UP (ref 1.8–7.4)
NEUTROPHILS NFR BLD AUTO: 52 % — SIGNIFICANT CHANGE UP (ref 43–77)
NRBC # BLD: 0 /100 WBCS — SIGNIFICANT CHANGE UP (ref 0–0)
PLATELET # BLD AUTO: 270 K/UL — SIGNIFICANT CHANGE UP (ref 150–400)
POTASSIUM SERPL-MCNC: 3.8 MMOL/L — SIGNIFICANT CHANGE UP (ref 3.5–5.3)
POTASSIUM SERPL-SCNC: 3.8 MMOL/L — SIGNIFICANT CHANGE UP (ref 3.5–5.3)
PROT SERPL-MCNC: 7.6 G/DL — SIGNIFICANT CHANGE UP (ref 6–8.3)
RBC # BLD: 3.12 M/UL — LOW (ref 3.8–5.2)
RBC # FLD: 13.2 % — SIGNIFICANT CHANGE UP (ref 10.3–14.5)
RSV RNA NPH QL NAA+NON-PROBE: SIGNIFICANT CHANGE UP
SARS-COV-2 RNA SPEC QL NAA+PROBE: SIGNIFICANT CHANGE UP
SODIUM SERPL-SCNC: 129 MMOL/L — LOW (ref 135–145)
WBC # BLD: 5.7 K/UL — SIGNIFICANT CHANGE UP (ref 3.8–10.5)
WBC # FLD AUTO: 5.7 K/UL — SIGNIFICANT CHANGE UP (ref 3.8–10.5)

## 2022-08-13 PROCEDURE — 87637 SARSCOV2&INF A&B&RSV AMP PRB: CPT

## 2022-08-13 PROCEDURE — 85025 COMPLETE CBC W/AUTO DIFF WBC: CPT

## 2022-08-13 PROCEDURE — 36415 COLL VENOUS BLD VENIPUNCTURE: CPT

## 2022-08-13 PROCEDURE — 99285 EMERGENCY DEPT VISIT HI MDM: CPT | Mod: 25

## 2022-08-13 PROCEDURE — 80053 COMPREHEN METABOLIC PANEL: CPT

## 2022-08-13 PROCEDURE — 71045 X-RAY EXAM CHEST 1 VIEW: CPT

## 2022-08-13 PROCEDURE — 71045 X-RAY EXAM CHEST 1 VIEW: CPT | Mod: 26

## 2022-08-13 RX ORDER — SODIUM CHLORIDE 9 MG/ML
500 INJECTION INTRAMUSCULAR; INTRAVENOUS; SUBCUTANEOUS ONCE
Refills: 0 | Status: COMPLETED | OUTPATIENT
Start: 2022-08-13 | End: 2022-08-13

## 2022-08-13 RX ADMIN — SODIUM CHLORIDE 500 MILLILITER(S): 9 INJECTION INTRAMUSCULAR; INTRAVENOUS; SUBCUTANEOUS at 03:07

## 2022-08-13 NOTE — ED PROVIDER NOTE - CLINICAL SUMMARY MEDICAL DECISION MAKING FREE TEXT BOX
Will repeat labs, electrolyte level, reassess. Will repeat labs, electrolyte level, reassess.    Cassandra Choi MD - Attending Physician: Pt here with concern for hyponatremia on labs outpatient. No prior. Reported some recent headache, but has been asymptomatic since. No new medications. Very well appearing, exam nonfocal, neuro intact and at baseline. Repeat labs, EKG for eval

## 2022-08-13 NOTE — ED PROVIDER NOTE - OBJECTIVE STATEMENT
72 y F, hx of CAD s/p CABG on DAPT, DM, HTN, HLD, sent by PMD office after routine lab finding of sodium 124. Patient reports headache, dizziness, bilateral LE weakness for a couple of days. Denies chest pain, shortness of breath. PMD is Dr. Maryse Whalen. On HCTZ for 9 months. Reports increased PO water intake due to family and doctor encouragement. 72 y F, hx of CAD s/p CABG on DAPT, DM, HTN, HLD, sent by PMD office after routine lab finding of sodium 124. Patient reports headache, dizziness, bilateral LE weakness for a couple of days, but none currently. Denies chest pain, shortness of breath. PMD is Dr. Maryse Whalen. On HCTZ for 9 months. Reports increased PO water intake due to family and doctor encouragement.

## 2022-08-13 NOTE — ED PROVIDER NOTE - PATIENT PORTAL LINK FT
You can access the FollowMyHealth Patient Portal offered by [facility] by registering at the following website: [email or web address]. By joining [employer]’s FollowMyHealth portal, you will also be able to view your health information using other applications (apps) compatible with our system.

## 2022-08-13 NOTE — ED PROVIDER NOTE - NSFOLLOWUPINSTRUCTIONS_ED_ALL_ED_FT
Please follow up with your Primary Medical Doctor within the next 7 days to monitor your symptoms.     Please come back to the Emergency Room if you started developing dizziness, lightheadedness, fall, weakness, numbness.

## 2022-08-13 NOTE — ED PROVIDER NOTE - PHYSICAL EXAMINATION
Gen: Patient is well-appearing, NAD, AAOx3, able to ambulate without assistance  HEENT: NCAT, EOMI, PEERLA, normal conjunctiva, tongue midline, oral mucosa moist  Lung: CTAB, no respiratory distress, no wheezes/rhonchi/rales B/L, speaking in full sentences  CV: RRR, no murmurs, rubs or gallops, distal pulses 2+ b/l  Abd: soft, NT, ND, no guarding, no rigidity, no rebound tenderness, no CVA tenderness   MSK: no visible deformities, ROM normal in UE/LE  Neuro: No focal sensory or motor deficits, normal CN exam  Skin: Warm, well perfused, no leg swelling  Psych: normal affect, calm

## 2022-08-13 NOTE — ED ADULT NURSE NOTE - OBJECTIVE STATEMENT
72 y.o F PMH HTN, HLD, NSTEMI, diabetes, presenting to the ED for hyponatremia via outpt labs, increased weakness in bilat legs and confusion/headache. Pt states that she has episodes of standing up and feeling confused about what is going on. Pt states that outpt labs show sodium level of 124. AOx3, MAEx4, distal pulses strong and regular, abd soft nontender/nondistended, skin warm to touch. Denies c/p, son, n/v/d, dizziness, headache, blurry vision, pain/discomfort. Bed in lowest position, wheels locked, appropriate side rails up. Safety maintained, comfort provided.

## 2022-08-13 NOTE — ED PROVIDER NOTE - PROGRESS NOTE DETAILS
Zita PGY2: Na 129. Patient received 500 mL NS. Will be discharged. Zita PGY2: Na 129. Patient received 500 mL NS. Asymptomatic. Will be discharged.

## 2022-08-18 ENCOUNTER — APPOINTMENT (OUTPATIENT)
Dept: UROGYNECOLOGY | Facility: CLINIC | Age: 72
End: 2022-08-18

## 2022-08-18 VITALS — TEMPERATURE: 96.8 F

## 2022-08-18 PROCEDURE — 99213 OFFICE O/P EST LOW 20 MIN: CPT

## 2022-08-18 NOTE — HISTORY OF PRESENT ILLNESS
[FreeTextEntry1] : May is a 71y/o that presents to the office for follow up for pelvic prolapse. Prolapse is being managed with a Shaatz #4 (2.50). Patient is happy with the support provided by this pessary, although it fell out a few days ago while having a BM. She denies any pelvic pain, pressure or vaginal bleeding.  She denies any urinary complaints. She states she has been having constipation, manages with fluids and prune juice. She is continues to use  Premarin cream twice a week.\par \par \par \par

## 2022-08-18 NOTE — PHYSICAL EXAM
[No Acute Distress] : in no acute distress [Well developed] : well developed [Well Nourished] : ~L well nourished [Good Hygeine] : demonstrates good hygeine [Oriented x3] : oriented to person, place, and time [Normal Memory] : ~T memory was ~L unimpaired [Normal Mood/Affect] : mood and affect are normal [Mass (___ Cm)] : no ~M [unfilled] abdominal mass was palpated [Tenderness] : ~T no ~M abdominal tenderness observed [Distended] : not distended [Warm and Dry] : was warm and dry to touch [Normal Gait] : gait was normal [Vulvar Atrophy] : vulvar atrophy [Labia Majora] : were normal [Labia Minora] : were normal [Normal] : was normal [Normal Appearance] : general appearance was normal [Atrophy] : atrophy [Cystocele] : a cystocele [Uterine Prolapse] : uterine prolapse [Discharge] : a  ~M vaginal discharge was present [Scant] : scant [Laura] : yellow [Thin] : thin [No Bleeding] : there was no active vaginal bleeding

## 2022-08-18 NOTE — PROCEDURE
[Good Fit] : fits well [Refit] : refit is not needed [Erosion] : no evidence of erosion [Erythema] : no erythema [Discharge] : there is vaginal discharge [Infection] : no evidence of infection [Pessary Inserted] : inserted [Pessary Washed] : washed [H2O] : H2O [None] : no bleeding [Medication Review] : Medicaiton Review: Patient verbalizes understanding of risks and benefits [Bowel Management] : Bowel Management: patient verbalizes understanding of daily dietary fiber intake [FreeTextEntry1] : Sonal# 4 (2.50) [FreeTextEntry3] : Premarin cream [FreeTextEntry4] : Advised to prevent straining with BM by managing with OTC stool softeners as needed.  [FreeTextEntry8] : Reviewed [facility]

## 2022-09-21 NOTE — ED ADULT TRIAGE NOTE - TEMPERATURE IN CELSIUS (DEGREES C)
History and Physical - PICU                                Luis Phelps 11 m o  female MRN: [id number]                             Unit/Bed#[facility] Encounter: [id number]         History of Present Illness   Chief Complaint: Respiratory distress  Luis Phelps is a 6 m o  female with no significant past medical history admitted critically ill to the PICU for acute respiratory distress secondary to viral bronchiolitis after presenting to the [facility] ED with respiratory distress  As per report and patient's mother, patient was in her relatively normal state of health (maybe some slight congestion/rhinorrhea) up until last night when she began having a fever with a tmax of 102F, nasal congestion, decreased appetite/wet diapers, fussiness, and increased work of breathing  These symptoms persisted into the morning when the mother took patient to the pediatrician  There, she was found to be tachypnic and retracting  She was febrile to 101 9F and was given ibuprofen and sent to the ED  Upon arrival in the ED, the patient was again noted to have retractions and tachypnea  She was febrile and given tylenol  Her COVID/RSV/flu was negative  Her CXR was consistent with viral/inflammatory small airway disease without consolidation  She was initiated on HFNC due to work of breathing and admitted to the PICU  Of note, the patient has several siblings who have been sick lately and recovered  She is UTD on all of her vaccinations but has not had a flu shot this year  She attends   She was born full term with a short NICU stay for hyperbilirubinemia ABO incompatibility requiring phototherapy  She has no other or past medical history  No Known Allergies  Historical Information   History reviewed  No pertinent past medical history  all medications and allergies reviewed    History reviewed  No pertinent surgical history       Growth and Development: normal  Nutrition: age appropriate  Immunizations: up to date and documented  Flu Shot: No   Family History: asthma in father    Social History   School/: Yes   Tobacco exposure: No   Pets: No  Travel: No   Household: lives at home with parent, siblings  Drug Use:    Social History     Substance and Sexual Activity   Drug Use Not on file     Tobacco Use:    Social History     Tobacco Use   Smoking Status Never Smoker   Smokeless Tobacco Never Used     Alcohol Use:   Social History     Substance and Sexual Activity   Alcohol Use None         ROS:   Review of Systems   Constitutional: Positive for appetite change, crying and fever  HENT: Positive for congestion and rhinorrhea  Respiratory: Negative for apnea, wheezing and stridor  Increased work of breathing   Cardiovascular: Negative for cyanosis  Gastrointestinal: Negative for abdominal distention, constipation, diarrhea and vomiting  Genitourinary: Positive for decreased urine volume  Skin: Negative for rash  Neurological: Negative for seizures  All other systems reviewed and are negative  Non-Invasive/Invasive Ventilation Settings:  Respiratory  Report   Lab Data (Last 4 hours)    None         O2/Vent Data (Last 4 hours)    None              No results found for: PHART, WOW5HDS, PO2ART, ASP3NBP, U0EZWLSW, BEART, SOURCE    Weights:      Body mass index is 14 29 kg/m²  Temperature:   Temp (24hrs), Av 4 °F (38 6 °C), Min:101 °F (38 3 °C), Max:101 9 °F (38 8 °C)    Current: Temperature: (!) 101 °F (38 3 °C)      SpO2: SpO2: 94 %, SpO2 Activity: SpO2 Activity: At Rest, SpO2 Device: O2 Device: None (Room air)   Vitals:  Vitals:    22 1303 22 1311 22 1410   BP: (!) 156/121  (!) 125/83   Pulse: (!) 178  (!) 170   Resp: (S) (!) 44  36   Temp: (!) 101 °F (38 3 °C)     TempSrc: Rectal     SpO2: 95%  94%   Weight:  8 34 kg (18 lb 6 2 oz)          Physical Exam:  Physical Exam  Vitals and nursing note reviewed     Constitutional: General: She is active  She is not in acute distress  Appearance: She is not toxic-appearing  Comments: Crying but consolable   HENT:      Head: Normocephalic and atraumatic  Anterior fontanelle is flat  Right Ear: External ear normal       Left Ear: External ear normal       Nose: Congestion and rhinorrhea present  Comments: Nares patent     Mouth/Throat:      Mouth: Mucous membranes are moist    Eyes:      General:         Right eye: No discharge  Left eye: No discharge  Extraocular Movements: Extraocular movements intact  Conjunctiva/sclera: Conjunctivae normal    Cardiovascular:      Rate and Rhythm: Regular rhythm  Tachycardia present  Heart sounds: Normal heart sounds  No murmur heard  No friction rub  No gallop  Pulmonary:      Effort: Tachypnea and retractions (subcostal, intercostal, supraclavicular) present  No respiratory distress or nasal flaring  Breath sounds: Normal breath sounds  No stridor  No wheezing, rhonchi or rales  Comments: Head bobbing  Abdominal:      General: Abdomen is flat  There is no distension  Palpations: Abdomen is soft  There is no mass  Musculoskeletal:         General: No deformity  Normal range of motion  Cervical back: Normal range of motion  Comments: No deformities   Skin:     General: Skin is warm and dry  Turgor: Normal       Coloration: Skin is not jaundiced  Findings: No erythema or rash  Neurological:      General: No focal deficit present  Mental Status: She is alert  Motor: No abnormal muscle tone  Labs:                             Imaging:  I have personally reviewed pertinent reports  No Chest XR results available for this patient       Micro:  No results found for: Abilio Sarna, SPUTUMCULTUR    Assessment: Patient is a 9 month old female admitted critically ill to the PICU with acute respiratory distress secondary to viral bronchiolitis  Plan:                  Neuro: No acute issues   -Ibuprofen/tylenol for pain/discomfort                 CV: Tachycardia   -Likely secondary to fever, agitation   -Continuous cardiac monitoring                 Pulm: Acute respiratory distress secondary to viral bronchiolitis   -HFNC in place for work of breathing, wean as clinically indicated   -Maintain SpO2 >92%   -Albuterol nebulizer prn, not currently wheezing                 GI: No acute issues                 FEN:    -F- received fluid bolus 20cc/kg in ED, will place on D5LR at maintenance 33mL/hr   -E- monitor and replete as needed, checking labs now   -N- NPO                 : No acute issues   -Monitor I&Os                 ID: Viral bronchiolitis   -Supportive care as above   -Frequent nasal suctioning   -CXR without infiltrates   -COVID/RSV/flu negative   -Will check CBC, BMP, mag, phos, procal, VBG   -Could consider RVP if clinically indicated, will hold off for time being   -No indication for antimicrobials at this time                 Heme: No acute issues                 Endo: No acute issues                 Msk/Skin: No acute issues                 Disposition: PICU           Invasive lines and devices: Invasive Devices  Report    Peripheral Intravenous Line  Duration           Peripheral IV 09/21/22 Left Foot <1 day                 Code Status: Level 1 - Full Code      Counseling / Coordination of Care  Time spent with patient 40 minutes   Total Critical Care time spent 0 minutes excluding procedures, teaching and family updates  I have seen and examined this patient   My note adresses my time spent in assessment of the patient's clinical condition, my treatment plan and medical decision making and my presence, activity, and involvement with this patient throughout the day      DO Ro 36.5

## 2022-09-30 NOTE — HISTORY OF PRESENT ILLNESS
[FreeTextEntry1] : 72 yr F HTN, s/p CABG\par We added nifedipine last time but patient has felt generally unwell with is. she stated that she was on amlodipine in the past that she had tolerated without any adcverse effects. there is no objective or descriptive symptoms of HF/ANgina/

## 2022-09-30 NOTE — PHYSICAL EXAM
[Well Developed] : well developed [Normal Conjunctiva] : normal conjunctiva [Normal Venous Pressure] : normal venous pressure [Normal Gait] : normal gait [No Rash] : no rash [Moves all extremities] : moves all extremities [de-identified] : 2 out of 6 ejection systolic murmur at the aortic area. [de-identified] : Lungs are clear chest wound seems to be healing well [de-identified] : Left thigh at the site of the surgical harvest site there is a little swelling under the surgical wound.

## 2022-09-30 NOTE — DISCUSSION/SUMMARY
[FreeTextEntry1] : 1 CAD: continue current meds\par \par 2.  Mild segmental LV dysfunction on echocardiogram but overall ejection fraction is normal with clinically no evidence of congestive heart failure.  We will continue with current medical therapy, changing her metoprolol to carvedilol\par \par 3.  Hypertension: at patients request and her daughter who is NP, we will start amlopdiine and stope nifedipine, fu PCP for Bp check in 1 week.\par \par 4.  Aortic valve disease, at this time aortic valve disease mild to moderate, clinical follow-up, echocardiogram in 2 to 3 years.

## 2022-09-30 NOTE — CARDIOLOGY SUMMARY
[de-identified] : Sinus rhythm, LVH, repolarization changes [de-identified] : Ejection fraction 50 to 55%, aortic valve area 1.6 cm².

## 2022-10-05 ENCOUNTER — APPOINTMENT (OUTPATIENT)
Dept: CARDIOLOGY | Facility: CLINIC | Age: 72
End: 2022-10-05

## 2022-10-05 ENCOUNTER — NON-APPOINTMENT (OUTPATIENT)
Age: 72
End: 2022-10-05

## 2022-10-05 VITALS
HEART RATE: 77 BPM | DIASTOLIC BLOOD PRESSURE: 94 MMHG | BODY MASS INDEX: 20.31 KG/M2 | SYSTOLIC BLOOD PRESSURE: 183 MMHG | HEIGHT: 60 IN | OXYGEN SATURATION: 100 % | TEMPERATURE: 97.6 F

## 2022-10-05 VITALS — BODY MASS INDEX: 20.31 KG/M2 | WEIGHT: 104 LBS

## 2022-10-05 PROCEDURE — 99214 OFFICE O/P EST MOD 30 MIN: CPT | Mod: 25

## 2022-10-05 PROCEDURE — 93000 ELECTROCARDIOGRAM COMPLETE: CPT

## 2022-10-05 RX ORDER — CLOPIDOGREL BISULFATE 75 MG/1
75 TABLET, FILM COATED ORAL DAILY
Qty: 30 | Refills: 1 | Status: COMPLETED | COMMUNITY
End: 2022-10-05

## 2022-10-05 RX ORDER — AMLODIPINE BESYLATE 2.5 MG/1
2.5 TABLET ORAL DAILY
Qty: 30 | Refills: 5 | Status: ACTIVE | COMMUNITY
Start: 2022-08-04

## 2022-10-19 ENCOUNTER — APPOINTMENT (OUTPATIENT)
Dept: UROGYNECOLOGY | Facility: CLINIC | Age: 72
End: 2022-10-19

## 2022-10-19 PROCEDURE — 99213 OFFICE O/P EST LOW 20 MIN: CPT

## 2022-10-19 NOTE — PROCEDURE
[Good Fit] : fits well [Discharge] : there is vaginal discharge [Pessary Inserted] : inserted [Pessary Washed] : washed [H2O] : H2O [None] : no bleeding [Medication Review] : Medicaiton Review: Patient verbalizes understanding of risks and benefits [Bowel Management] : Bowel Management: patient verbalizes understanding of daily dietary fiber intake [Refit] : refit is not needed [Erosion] : no evidence of erosion [Erythema] : no erythema [Infection] : no evidence of infection [FreeTextEntry1] : Sonal# 4 (2.50) [de-identified] : scant yellow discharge [FreeTextEntry3] : Premarin cream [FreeTextEntry8] : Reviewed [facility]

## 2022-10-19 NOTE — DISCUSSION/SUMMARY
[FreeTextEntry1] : Pelvic prolapse:\par - Continue with Shaatz # 4 (2.50).\par - Pessary Precautions and instructions were reviewed.\par - Discussed importance of preventing constipation by managing with fluids, diet, and OTC stool softeners as needed\par \par Vaginal atrophy:\par - Continue use of Premarin cream BIW.\par - May use vaginal lubrication on alternative days.\par \par Follow up in 3 months or sooner if needed  \par If pt have any problems/concern to call office. PT aware and agrees.

## 2022-10-19 NOTE — PHYSICAL EXAM
[No Acute Distress] : in no acute distress [Well developed] : well developed [Well Nourished] : ~L well nourished [Good Hygeine] : demonstrates good hygeine [Oriented x3] : oriented to person, place, and time [Normal Memory] : ~T memory was ~L unimpaired [Normal Mood/Affect] : mood and affect are normal [Warm and Dry] : was warm and dry to touch [Normal Gait] : gait was normal [Vulvar Atrophy] : vulvar atrophy [Labia Majora] : were normal [Labia Minora] : were normal [Normal] : was normal [Normal Appearance] : general appearance was normal [Atrophy] : atrophy [Cystocele] : a cystocele [Uterine Prolapse] : uterine prolapse [Discharge] : a  ~M vaginal discharge was present [Scant] : scant [Laura] : yellow [Thin] : thin [No Bleeding] : there was no active vaginal bleeding [Mass (___ Cm)] : no ~M [unfilled] abdominal mass was palpated [Tenderness] : ~T no ~M abdominal tenderness observed [Distended] : not distended

## 2022-10-19 NOTE — HISTORY OF PRESENT ILLNESS
[FreeTextEntry1] : May is a 73y/o that presents to the office for follow up for pelvic prolapse. Prolapse is being managed with a Shaatz #4 (2.50). Patient is happy with the support provided by this pessary. She denies any pelvic pain, pressure or vaginal bleeding.  She denies any urinary complaints. She states she has been having constipation, manages with fluids and prune juice. She is continues to use  Premarin cream twice a week.\par \par \par \par

## 2022-11-10 NOTE — ED PROVIDER NOTE - RESPIRATORY [+], MLM
SHORTNESS OF BREATH Mohs Rapid Report Verbiage: The area of clinically evident tumor was marked with skin marking ink and appropriately hatched.  The initial incision was made following the Mohs approach through the skin.  The specimen was taken to the lab, divided into the necessary number of pieces, chromacoded and processed according to the Mohs protocol.  This was repeated in successive stages until a tumor free defect was achieved.

## 2022-11-16 NOTE — ED ADULT TRIAGE NOTE - CCCP TRG CHIEF CMPLNT
Good morning,    I am sending you the request for a refill.    Thanks,  Anny GONZALEZ    
hypertension

## 2022-11-18 NOTE — CARDIOLOGY SUMMARY
[de-identified] : Sinus rhythm, LVH, repolarization changes [de-identified] : Ejection fraction 50 to 55%, aortic valve area 1.6 cm².

## 2022-11-18 NOTE — DISCUSSION/SUMMARY
[EKG obtained to assist in diagnosis and management of assessed problem(s)] : EKG obtained to assist in diagnosis and management of assessed problem(s) [FreeTextEntry1] : 1 CAD: continue current meds\par \par 2.  Mild segmental LV dysfunction on echocardiogram but overall ejection fraction is normal with clinically no evidence of congestive heart failure.  We will continue with current medical therapy, changing her metoprolol to carvedilol\par \par 3.  Hypertension: at patients request and her daughter who is NP, we will start amlodipine and stope nifedipine, fu PCP for Bp check in 1 week.\par \par 4.  Aortic valve disease, at this time aortic valve disease mild to moderate, clinical follow-up, echocardiogram in 2 to 3 years.

## 2022-11-18 NOTE — ADDENDUM
[FreeTextEntry1] : From cardiovascular perspective patient does not have any active cardiac symptoms to warrant further testing.  She is being scheduled for gastroenterology procedures and from my standpoint no further testing is indicated, I do recommend that she should follow-up with her PCP to get her blood pressure monitored and if it is optimized then she should be optimized from cardiovascular standpoint for any GI procedures thank you

## 2022-11-18 NOTE — PHYSICAL EXAM
[Well Developed] : well developed [Normal Conjunctiva] : normal conjunctiva [Normal Venous Pressure] : normal venous pressure [Normal Gait] : normal gait [No Rash] : no rash [Moves all extremities] : moves all extremities [de-identified] : 2 out of 6 ejection systolic murmur at the aortic area. [de-identified] : Lungs are clear chest wound seems to be healing well [de-identified] : Left thigh at the site of the surgical harvest site there is a little swelling under the surgical wound.

## 2022-12-07 ENCOUNTER — NON-APPOINTMENT (OUTPATIENT)
Age: 72
End: 2022-12-07

## 2022-12-07 ENCOUNTER — APPOINTMENT (OUTPATIENT)
Dept: CARDIOLOGY | Facility: CLINIC | Age: 72
End: 2022-12-07

## 2022-12-07 VITALS
OXYGEN SATURATION: 99 % | DIASTOLIC BLOOD PRESSURE: 73 MMHG | HEART RATE: 71 BPM | HEIGHT: 60 IN | WEIGHT: 100 LBS | SYSTOLIC BLOOD PRESSURE: 172 MMHG | BODY MASS INDEX: 19.63 KG/M2

## 2022-12-07 PROCEDURE — 99214 OFFICE O/P EST MOD 30 MIN: CPT | Mod: 25

## 2022-12-07 PROCEDURE — 93000 ELECTROCARDIOGRAM COMPLETE: CPT

## 2022-12-07 RX ORDER — OMEPRAZOLE 20 MG/1
20 CAPSULE, DELAYED RELEASE ORAL
Qty: 90 | Refills: 0 | Status: COMPLETED | COMMUNITY
Start: 2022-11-02

## 2022-12-07 RX ORDER — SITAGLIPTIN 100 MG/1
TABLET, FILM COATED ORAL
Refills: 0 | Status: ACTIVE | COMMUNITY

## 2022-12-07 RX ORDER — LOSARTAN POTASSIUM AND HYDROCHLOROTHIAZIDE 12.5; 1 MG/1; MG/1
100-12.5 TABLET ORAL
Qty: 90 | Refills: 0 | Status: COMPLETED | COMMUNITY
Start: 2022-08-17

## 2022-12-07 RX ORDER — SITAGLIPTIN 50 MG/1
50 TABLET, FILM COATED ORAL
Qty: 90 | Refills: 0 | Status: COMPLETED | COMMUNITY
Start: 2022-10-17

## 2022-12-07 RX ORDER — AMLODIPINE BESYLATE 5 MG/1
5 TABLET ORAL
Qty: 30 | Refills: 0 | Status: COMPLETED | COMMUNITY
Start: 2022-08-04

## 2022-12-07 NOTE — CARDIOLOGY SUMMARY
[de-identified] : Sinus rhythm, LVH, repolarization changes [de-identified] : Ejection fraction 50 to 55%, aortic valve area 1.6 cm².

## 2022-12-07 NOTE — HISTORY OF PRESENT ILLNESS
[FreeTextEntry1] : 72 yr F HTN, s/p CABG\par \par overall feels well\par no CP/SOB\par \par BEING SCHEDULED FOR COLONOSCOPY. /

## 2022-12-07 NOTE — PHYSICAL EXAM
[Well Developed] : well developed [Normal Conjunctiva] : normal conjunctiva [Normal Venous Pressure] : normal venous pressure [Normal Gait] : normal gait [No Rash] : no rash [Moves all extremities] : moves all extremities [de-identified] : 2 out of 6 ejection systolic murmur at the aortic area. [de-identified] : Lungs are clear chest wound seems to be healing well [de-identified] : Left thigh at the site of the surgical harvest site there is a little swelling under the surgical wound.

## 2022-12-07 NOTE — DISCUSSION/SUMMARY
[FreeTextEntry1] : 1 CAD: continue current meds. ON asa. \par \par 2.  Mild segmental LV dysfunction on echocardiogram but overall ejection fraction is normal with clinically no evidence of congestive heart failure.  We will continue with current medical therapy, changing her metoprolol to carvedilol\par \par 3.  Hypertension: home BP logs show BP is less than 140/70/. at this time i recommend that we go by her hoME bp LOGS. and continue current meds, \par \par 4.  Aortic valve disease, at this time aortic valve disease mild to moderate, clinical follow-up, echocardiogram in 2 to 3 years.\par \par preop: patient is optimized for colonoscopy procedure, muse continue with ASA and other cardiac meds as listed.,  [EKG obtained to assist in diagnosis and management of assessed problem(s)] : EKG obtained to assist in diagnosis and management of assessed problem(s)

## 2023-01-18 ENCOUNTER — APPOINTMENT (OUTPATIENT)
Dept: UROGYNECOLOGY | Facility: CLINIC | Age: 73
End: 2023-01-18
Payer: MEDICARE

## 2023-01-18 VITALS
SYSTOLIC BLOOD PRESSURE: 171 MMHG | HEIGHT: 60 IN | OXYGEN SATURATION: 99 % | HEART RATE: 84 BPM | BODY MASS INDEX: 20.03 KG/M2 | WEIGHT: 102 LBS | DIASTOLIC BLOOD PRESSURE: 77 MMHG

## 2023-01-18 PROCEDURE — 99213 OFFICE O/P EST LOW 20 MIN: CPT

## 2023-01-19 NOTE — HISTORY OF PRESENT ILLNESS
[FreeTextEntry1] : May is a 73y/o that presents to the office for follow up for pelvic prolapse. Prolapse is being managed with a Shaatz #4 (2.50). Patient is happy with the support provided by this pessary. She denies any pelvic pain, pressure or vaginal bleeding.  She denies any urinary complaints. She states she has been having constipation, manages with fluids and prune juice. She uses  Premarin cream twice a week.\par \par \par \par

## 2023-01-19 NOTE — DISCUSSION/SUMMARY
[FreeTextEntry1] : Pelvic prolapse:\par - Continue with Shaatz # 4.\par - Pessary Precautions and instructions were reviewed.\par \par Vaginal atrophy:\par - Continue use of Premarin cream BIW.\par - May use vaginal lubrication on alternative days.\par \par Follow up in 3 months or sooner if needed  \par If pt have any problems/concern to call office. PT aware and agrees.

## 2023-01-19 NOTE — PROCEDURE
[Good Fit] : fits well [Discharge] : there is vaginal discharge [Pessary Inserted] : inserted [Pessary Washed] : washed [H2O] : H2O [None] : no bleeding [Medication Review] : Medicaiton Review: Patient verbalizes understanding of risks and benefits [Bowel Management] : Bowel Management: patient verbalizes understanding of daily dietary fiber intake [Refit] : refit is not needed [Erosion] : no evidence of erosion [Erythema] : no erythema [Infection] : no evidence of infection [FreeTextEntry1] : Sonal# 4 (2.50) [de-identified] : scant yellow discharge [FreeTextEntry3] : Premarin cream [FreeTextEntry8] : Reviewed [facility]

## 2023-01-25 ENCOUNTER — APPOINTMENT (OUTPATIENT)
Dept: CARDIOLOGY | Facility: CLINIC | Age: 73
End: 2023-01-25

## 2023-04-03 NOTE — ED ADULT NURSE NOTE - CAS DISCH ACCOMP BY
Referred to Dr. Serjio Hyde with [facility]  Call [phone] to set up an appointment after 4/4/23.  
Family/Self

## 2023-04-13 ENCOUNTER — APPOINTMENT (OUTPATIENT)
Dept: UROGYNECOLOGY | Facility: CLINIC | Age: 73
End: 2023-04-13
Payer: MEDICARE

## 2023-04-13 PROCEDURE — 99213 OFFICE O/P EST LOW 20 MIN: CPT

## 2023-04-13 NOTE — PROCEDURE
[Good Fit] : fits well [Refit] : refit is not needed [Erosion] : no evidence of erosion [Erythema] : no erythema [Discharge] : there is vaginal discharge [Infection] : no evidence of infection [Pessary Inserted] : inserted [Pessary Washed] : washed [H2O] : H2O [None] : no bleeding [Medication Review] : Medicaiton Review: Patient verbalizes understanding of risks and benefits [Bowel Management] : Bowel Management: patient verbalizes understanding of daily dietary fiber intake [FreeTextEntry1] : Sonal# 4 (2.50) [de-identified] : scant yellow discharge [FreeTextEntry3] : Premarin cream [FreeTextEntry8] : Reviewed [facility]

## 2023-04-13 NOTE — HISTORY OF PRESENT ILLNESS
[FreeTextEntry1] : May is a 71y/o that presents to the office for follow up for pelvic prolapse. Prolapse is being managed with a Shaatz #4 (2.50). Patient is happy with the support provided by this pessary. She denies any pelvic pain, pressure or vaginal bleeding.  She denies any urinary complaints. She is using Premarin cream twice a week.\par \par \par \par

## 2023-06-07 ENCOUNTER — NON-APPOINTMENT (OUTPATIENT)
Age: 73
End: 2023-06-07

## 2023-06-07 ENCOUNTER — APPOINTMENT (OUTPATIENT)
Dept: CARDIOLOGY | Facility: CLINIC | Age: 73
End: 2023-06-07
Payer: MEDICARE

## 2023-06-07 VITALS
DIASTOLIC BLOOD PRESSURE: 74 MMHG | HEART RATE: 67 BPM | SYSTOLIC BLOOD PRESSURE: 180 MMHG | WEIGHT: 107 LBS | OXYGEN SATURATION: 96 % | BODY MASS INDEX: 21.01 KG/M2 | HEIGHT: 60 IN

## 2023-06-07 PROCEDURE — 99214 OFFICE O/P EST MOD 30 MIN: CPT | Mod: 25

## 2023-06-07 PROCEDURE — 93000 ELECTROCARDIOGRAM COMPLETE: CPT

## 2023-07-05 NOTE — HISTORY OF PRESENT ILLNESS
[FreeTextEntry1] : 72 yr F HTN, s/p CABG\par Patient overall feels well except that her blood pressure has been poorly controlled based on the office visit but patient reports that her blood pressure at home is in the normal range.

## 2023-07-05 NOTE — CARDIOLOGY SUMMARY
[de-identified] : Sinus rhythm, LVH, repolarization changes [de-identified] : Ejection fraction 50 to 55%, aortic valve area 1.6 cm².

## 2023-07-05 NOTE — DISCUSSION/SUMMARY
[FreeTextEntry1] : 1 CAD: continue current meds with aspirin 81 mg daily atorvastatin 40 mg daily.\par \par 2.  Mild segmental LV dysfunction on echocardiogram but overall ejection fraction is normal with clinically no evidence of congestive heart failure.  Continue with carvedilol 25 mg twice daily she is on losartan hydrochlorothiazide 100/25 mg once daily.\par \par 3.  Hypertension: Her blood pressure is elevated, I had a detailed discussion with the patient and recommend that she follows up with her primary care physician.  At this per the patient her blood pressure at home is much better controlled, I recommend she can maintain the home blood pressure log and takes her equipment with her for review.will\par \par 4.  Aortic valve disease, at this time aortic valve disease mild to moderate, clinical follow-up, echocardiogram in 2 to 3 years. [EKG obtained to assist in diagnosis and management of assessed problem(s)] : EKG obtained to assist in diagnosis and management of assessed problem(s)

## 2023-07-05 NOTE — PHYSICAL EXAM
[Well Developed] : well developed [Normal Conjunctiva] : normal conjunctiva [Normal Venous Pressure] : normal venous pressure [Normal Gait] : normal gait [No Rash] : no rash [Moves all extremities] : moves all extremities [de-identified] : 2 out of 6 ejection systolic murmur at the aortic area. [de-identified] : Lungs are clear chest wound seems to be healing well [de-identified] : Left thigh at the site of the surgical harvest site there is a little swelling under the surgical wound.

## 2023-07-06 ENCOUNTER — APPOINTMENT (OUTPATIENT)
Dept: UROGYNECOLOGY | Facility: CLINIC | Age: 73
End: 2023-07-06
Payer: MEDICARE

## 2023-07-06 VITALS
WEIGHT: 107.31 LBS | HEART RATE: 76 BPM | HEIGHT: 60 IN | DIASTOLIC BLOOD PRESSURE: 70 MMHG | SYSTOLIC BLOOD PRESSURE: 160 MMHG | BODY MASS INDEX: 21.07 KG/M2

## 2023-07-06 PROCEDURE — 99213 OFFICE O/P EST LOW 20 MIN: CPT

## 2023-07-06 NOTE — PROCEDURE
[Good Fit] : fits well [Refit] : refit is not needed [Erosion] : no evidence of erosion [Erythema] : no erythema [Discharge] : there is vaginal discharge [Infection] : no evidence of infection [Pessary Inserted] : inserted [Pessary Washed] : washed [H2O] : H2O [None] : no bleeding [Medication Review] : Medicaiton Review: Patient verbalizes understanding of risks and benefits [Bowel Management] : Bowel Management: patient verbalizes understanding of daily dietary fiber intake [FreeTextEntry1] : Sonal# 4 (2.50) [de-identified] : scant yellow discharge [FreeTextEntry3] : Premarin cream [FreeTextEntry8] : Reviewed [facility]

## 2023-07-06 NOTE — HISTORY OF PRESENT ILLNESS
[FreeTextEntry1] : May, 73y/o presents to the office for follow up for pelvic prolapse,supported with Sonal #4 (2.50). Patient happy with the support. She denies any pelvic pain, pressure or vaginal bleeding.  She denies any urinary complaints. She is using Premarin cream twice a week.\par Premarin cost her $300 a tube.  She will discuss with her pharmacy if she is covered for something cheaper and will let our office know.\par \par \par \par

## 2023-07-19 ENCOUNTER — APPOINTMENT (OUTPATIENT)
Dept: CARDIOLOGY | Facility: CLINIC | Age: 73
End: 2023-07-19
Payer: MEDICARE

## 2023-07-19 VITALS
BODY MASS INDEX: 21.01 KG/M2 | SYSTOLIC BLOOD PRESSURE: 172 MMHG | OXYGEN SATURATION: 97 % | HEIGHT: 60 IN | DIASTOLIC BLOOD PRESSURE: 72 MMHG | WEIGHT: 107 LBS | HEART RATE: 74 BPM

## 2023-07-19 DIAGNOSIS — I35.0 NONRHEUMATIC AORTIC (VALVE) STENOSIS: ICD-10-CM

## 2023-07-19 PROCEDURE — 93000 ELECTROCARDIOGRAM COMPLETE: CPT

## 2023-07-19 PROCEDURE — 99214 OFFICE O/P EST MOD 30 MIN: CPT | Mod: 25

## 2023-08-08 NOTE — PHYSICAL EXAM
[Well Developed] : well developed [Normal Conjunctiva] : normal conjunctiva [Normal Venous Pressure] : normal venous pressure [Normal Gait] : normal gait [No Rash] : no rash [Moves all extremities] : moves all extremities [de-identified] : 2 out of 6 ejection systolic murmur at the aortic area. [de-identified] : Lungs are clear chest wound seems to be healing well [de-identified] : Left thigh at the site of the surgical harvest site there is a little swelling under the surgical wound.

## 2023-08-08 NOTE — DISCUSSION/SUMMARY
[FreeTextEntry1] : 1 CAD: continue current meds with aspirin 81 mg daily atorvastatin 40 mg daily.  2.  HTN: Normal BP readings at home,  Her blood pressure at home is much better controlled. We checked the blood pressure in the office using her own machine and our equipment, and it matches puree. So I am rest assured that her blood pressure readings with her own equipment seems to be reliable. continue current meds  Losrtan/HCTZ/Coreg [EKG obtained to assist in diagnosis and management of assessed problem(s)] : EKG obtained to assist in diagnosis and management of assessed problem(s)

## 2023-08-08 NOTE — CARDIOLOGY SUMMARY
[de-identified] : Sinus rhythm, LVH, repolarization changes [de-identified] : Ejection fraction 50 to 55%, aortic valve area 1.6 cm.

## 2023-08-28 ENCOUNTER — EMERGENCY (EMERGENCY)
Facility: HOSPITAL | Age: 73
LOS: 1 days | Discharge: HOME | End: 2023-08-28
Attending: EMERGENCY MEDICINE
Payer: COMMERCIAL

## 2023-08-28 VITALS
WEIGHT: 110.01 LBS | DIASTOLIC BLOOD PRESSURE: 73 MMHG | SYSTOLIC BLOOD PRESSURE: 185 MMHG | TEMPERATURE: 98 F | OXYGEN SATURATION: 100 % | RESPIRATION RATE: 18 BRPM | HEART RATE: 69 BPM

## 2023-08-28 DIAGNOSIS — Z98.49 CATARACT EXTRACTION STATUS, UNSPECIFIED EYE: Chronic | ICD-10-CM

## 2023-08-28 LAB
ALBUMIN SERPL ELPH-MCNC: 4.6 G/DL — SIGNIFICANT CHANGE UP (ref 3.3–5)
ALP SERPL-CCNC: 109 U/L — SIGNIFICANT CHANGE UP (ref 40–120)
ALT FLD-CCNC: 14 U/L — SIGNIFICANT CHANGE UP (ref 10–45)
ANION GAP SERPL CALC-SCNC: 12 MMOL/L — SIGNIFICANT CHANGE UP (ref 5–17)
AST SERPL-CCNC: 24 U/L — SIGNIFICANT CHANGE UP (ref 10–40)
BASOPHILS # BLD AUTO: 0.03 K/UL — SIGNIFICANT CHANGE UP (ref 0–0.2)
BASOPHILS NFR BLD AUTO: 0.4 % — SIGNIFICANT CHANGE UP (ref 0–2)
BILIRUB SERPL-MCNC: 0.5 MG/DL — SIGNIFICANT CHANGE UP (ref 0.2–1.2)
BUN SERPL-MCNC: 35 MG/DL — HIGH (ref 7–23)
CALCIUM SERPL-MCNC: 9.9 MG/DL — SIGNIFICANT CHANGE UP (ref 8.4–10.5)
CHLORIDE SERPL-SCNC: 101 MMOL/L — SIGNIFICANT CHANGE UP (ref 96–108)
CO2 SERPL-SCNC: 25 MMOL/L — SIGNIFICANT CHANGE UP (ref 22–31)
CREAT SERPL-MCNC: 1.2 MG/DL — SIGNIFICANT CHANGE UP (ref 0.5–1.3)
EGFR: 47 ML/MIN/1.73M2 — LOW
EOSINOPHIL # BLD AUTO: 0.1 K/UL — SIGNIFICANT CHANGE UP (ref 0–0.5)
EOSINOPHIL NFR BLD AUTO: 1.3 % — SIGNIFICANT CHANGE UP (ref 0–6)
GLUCOSE SERPL-MCNC: 148 MG/DL — HIGH (ref 70–99)
HCT VFR BLD CALC: 33 % — LOW (ref 34.5–45)
HGB BLD-MCNC: 10.7 G/DL — LOW (ref 11.5–15.5)
IMM GRANULOCYTES NFR BLD AUTO: 0.3 % — SIGNIFICANT CHANGE UP (ref 0–0.9)
LYMPHOCYTES # BLD AUTO: 1.81 K/UL — SIGNIFICANT CHANGE UP (ref 1–3.3)
LYMPHOCYTES # BLD AUTO: 23.8 % — SIGNIFICANT CHANGE UP (ref 13–44)
MAGNESIUM SERPL-MCNC: 2 MG/DL — SIGNIFICANT CHANGE UP (ref 1.6–2.6)
MCHC RBC-ENTMCNC: 29.4 PG — SIGNIFICANT CHANGE UP (ref 27–34)
MCHC RBC-ENTMCNC: 32.4 GM/DL — SIGNIFICANT CHANGE UP (ref 32–36)
MCV RBC AUTO: 90.7 FL — SIGNIFICANT CHANGE UP (ref 80–100)
MONOCYTES # BLD AUTO: 0.61 K/UL — SIGNIFICANT CHANGE UP (ref 0–0.9)
MONOCYTES NFR BLD AUTO: 8 % — SIGNIFICANT CHANGE UP (ref 2–14)
NEUTROPHILS # BLD AUTO: 5.04 K/UL — SIGNIFICANT CHANGE UP (ref 1.8–7.4)
NEUTROPHILS NFR BLD AUTO: 66.2 % — SIGNIFICANT CHANGE UP (ref 43–77)
NRBC # BLD: 0 /100 WBCS — SIGNIFICANT CHANGE UP (ref 0–0)
NT-PROBNP SERPL-SCNC: 760 PG/ML — HIGH (ref 0–300)
PLATELET # BLD AUTO: 218 K/UL — SIGNIFICANT CHANGE UP (ref 150–400)
POTASSIUM SERPL-MCNC: 4.2 MMOL/L — SIGNIFICANT CHANGE UP (ref 3.5–5.3)
POTASSIUM SERPL-SCNC: 4.2 MMOL/L — SIGNIFICANT CHANGE UP (ref 3.5–5.3)
PROT SERPL-MCNC: 8.3 G/DL — SIGNIFICANT CHANGE UP (ref 6–8.3)
RBC # BLD: 3.64 M/UL — LOW (ref 3.8–5.2)
RBC # FLD: 13.2 % — SIGNIFICANT CHANGE UP (ref 10.3–14.5)
SODIUM SERPL-SCNC: 138 MMOL/L — SIGNIFICANT CHANGE UP (ref 135–145)
TROPONIN T, HIGH SENSITIVITY RESULT: 8 NG/L — SIGNIFICANT CHANGE UP (ref 0–51)
WBC # BLD: 7.61 K/UL — SIGNIFICANT CHANGE UP (ref 3.8–10.5)
WBC # FLD AUTO: 7.61 K/UL — SIGNIFICANT CHANGE UP (ref 3.8–10.5)

## 2023-08-28 PROCEDURE — 72125 CT NECK SPINE W/O DYE: CPT | Mod: 26,MH

## 2023-08-28 PROCEDURE — 93005 ELECTROCARDIOGRAM TRACING: CPT

## 2023-08-28 PROCEDURE — 72170 X-RAY EXAM OF PELVIS: CPT

## 2023-08-28 PROCEDURE — 70450 CT HEAD/BRAIN W/O DYE: CPT | Mod: 26,MH

## 2023-08-28 PROCEDURE — 71045 X-RAY EXAM CHEST 1 VIEW: CPT

## 2023-08-28 PROCEDURE — 85025 COMPLETE CBC W/AUTO DIFF WBC: CPT

## 2023-08-28 PROCEDURE — 73110 X-RAY EXAM OF WRIST: CPT

## 2023-08-28 PROCEDURE — 73110 X-RAY EXAM OF WRIST: CPT | Mod: 26,RT

## 2023-08-28 PROCEDURE — 73562 X-RAY EXAM OF KNEE 3: CPT | Mod: 26,RT

## 2023-08-28 PROCEDURE — 99285 EMERGENCY DEPT VISIT HI MDM: CPT | Mod: 25

## 2023-08-28 PROCEDURE — 83735 ASSAY OF MAGNESIUM: CPT

## 2023-08-28 PROCEDURE — 80053 COMPREHEN METABOLIC PANEL: CPT

## 2023-08-28 PROCEDURE — 71045 X-RAY EXAM CHEST 1 VIEW: CPT | Mod: 26

## 2023-08-28 PROCEDURE — 90471 IMMUNIZATION ADMIN: CPT

## 2023-08-28 PROCEDURE — 72170 X-RAY EXAM OF PELVIS: CPT | Mod: 26

## 2023-08-28 PROCEDURE — 84484 ASSAY OF TROPONIN QUANT: CPT

## 2023-08-28 PROCEDURE — 73130 X-RAY EXAM OF HAND: CPT

## 2023-08-28 PROCEDURE — 72125 CT NECK SPINE W/O DYE: CPT | Mod: MH

## 2023-08-28 PROCEDURE — 99284 EMERGENCY DEPT VISIT MOD MDM: CPT

## 2023-08-28 PROCEDURE — 90715 TDAP VACCINE 7 YRS/> IM: CPT

## 2023-08-28 PROCEDURE — 73562 X-RAY EXAM OF KNEE 3: CPT

## 2023-08-28 PROCEDURE — 73130 X-RAY EXAM OF HAND: CPT | Mod: 26,RT

## 2023-08-28 PROCEDURE — 83880 ASSAY OF NATRIURETIC PEPTIDE: CPT

## 2023-08-28 PROCEDURE — 70450 CT HEAD/BRAIN W/O DYE: CPT | Mod: MH

## 2023-08-28 RX ORDER — TETANUS TOXOID, REDUCED DIPHTHERIA TOXOID AND ACELLULAR PERTUSSIS VACCINE, ADSORBED 5; 2.5; 8; 8; 2.5 [IU]/.5ML; [IU]/.5ML; UG/.5ML; UG/.5ML; UG/.5ML
0.5 SUSPENSION INTRAMUSCULAR ONCE
Refills: 0 | Status: COMPLETED | OUTPATIENT
Start: 2023-08-28 | End: 2023-08-28

## 2023-08-28 RX ORDER — ACETAMINOPHEN 500 MG
650 TABLET ORAL ONCE
Refills: 0 | Status: COMPLETED | OUTPATIENT
Start: 2023-08-28 | End: 2023-08-28

## 2023-08-28 RX ADMIN — TETANUS TOXOID, REDUCED DIPHTHERIA TOXOID AND ACELLULAR PERTUSSIS VACCINE, ADSORBED 0.5 MILLILITER(S): 5; 2.5; 8; 8; 2.5 SUSPENSION INTRAMUSCULAR at 23:30

## 2023-08-28 RX ADMIN — Medication 650 MILLIGRAM(S): at 22:10

## 2023-08-28 NOTE — ED PROVIDER NOTE - RAPID ASSESSMENT
73-year-old female status post triple bypass on aspirin presenting to the emergency department for a fall today on concrete.  Patient reports she does not know what occurred 1 minute she was walking, the next she was on the ground.  She reports head strike with a laceration sustained to the right upper face.  Patient denies a headache however reports she did have a headache earlier.  Patient with right wrist pain and deformity but reports she is able to move and feel of her fingers.  Also complaining of right-sided knee pain.  Patient is ambulatory in triage, A+O x3.

## 2023-08-28 NOTE — ED ADULT NURSE NOTE - OBJECTIVE STATEMENT
72 y/o Female presents to ED from home with c/o fall. PMH of triple bypass, HTN, HLD, DM . Pt states she was walking outside when she had a witnessed fall onto concrete. No LOC, pt does not remember what caused her fall but tried to brace it by landing on right side and right wrist. Pt is on Aspirin. Endorses mild HA.  Denies SOB, CP, dizziness, N/V/D. Pt is A&Ox3, well appearing/ speaking full sentences without difficulty. Airway patent with spontaneous unlabored breathing, Equal B/L upper and lower extremity strength, R wrist is swollen. Equal sensation and strength to R and L hand. Pt placed on continuous cardiac monitor. IV inserted labs drawn and sent. Safety maintained bed is in the lowest position, locked.

## 2023-08-28 NOTE — ED ADULT NURSE NOTE - NSFALLUNIVINTERV_ED_ALL_ED
Bed/Stretcher in lowest position, wheels locked, appropriate side rails in place/Call bell, personal items and telephone in reach/Instruct patient to call for assistance before getting out of bed/chair/stretcher/Non-slip footwear applied when patient is off stretcher/[place] to call system/Physically safe environment - no spills, clutter or unnecessary equipment/Purposeful proactive rounding/Room/bathroom lighting operational, light cord in reach

## 2023-08-28 NOTE — ED PROVIDER NOTE - NSFOLLOWUPINSTRUCTIONS_ED_ALL_ED_FT
You came to the hospital after a fall. You underwent imaging of your arms, leg, head, and neck, but there was no fracture or sign of severe injury.   You had a cut on your right forehead, which was closed with steri-strips. These strips will fall off on there own after several days.  Please return to the hospital if you develop a severe headache, changes to your vision, or new weakness.

## 2023-08-28 NOTE — ED PROVIDER NOTE - CLINICAL SUMMARY MEDICAL DECISION MAKING FREE TEXT BOX
73F w/ pmh CAD s/p CABG on aspirin, DM, HTN, HLD presenting after a fall with positive head strike. No LOC, CT head negative, RUE xray w/o acute fracture, R knee xray normal. 73F w/ pmh CAD s/p CABG on aspirin, DM, HTN, HLD presenting after a fall with positive head strike. No LOC, CT head negative, CT neck w/o fracture, RUE xray w/o acute fracture, R knee xray normal. 73F w/ pmh CAD s/p CABG on aspirin, DM, HTN, HLD presenting after a fall with positive head strike. No LOC, CT head negative, CT neck w/o fracture, RUE xray w/o acute fracture, R knee xray normal.    JAS Stern MD: Agree with resident/ACP MDM, assessment and plan as above.

## 2023-08-28 NOTE — ED PROVIDER NOTE - PATIENT PORTAL LINK FT
You can access the FollowMyHealth Patient Portal offered by [facility] by registering at the following website: [email or web address]. By joining [employer]’s FollowMyHealth portal, you will also be able to view your health information using other applications (apps) compatible with our system.

## 2023-08-28 NOTE — ED PROVIDER NOTE - OBJECTIVE STATEMENT
73F w/ pmh CAD s/p CABG on aspirin, DM, HTN, HLD presenting after a fall. Patient is not sure why she fell, but believes she had no LOC. She landed on outstretched R hand and hit her forehead. Denies preceding dizziness, chest pain, palpitations, weakness, SOB, cough. States R wrist hurts ~5/10, but she has no issues holding objects with the hand.

## 2023-08-29 VITALS
RESPIRATION RATE: 16 BRPM | TEMPERATURE: 98 F | SYSTOLIC BLOOD PRESSURE: 169 MMHG | OXYGEN SATURATION: 99 % | DIASTOLIC BLOOD PRESSURE: 74 MMHG | HEART RATE: 66 BPM

## 2023-08-31 NOTE — PHYSICAL THERAPY INITIAL EVALUATION ADULT - THERAPY FREQUENCY, PT EVAL
[Capable of only limited self care, confined to bed or chair more than 50% of waking hours] : Status 3- Capable of only limited self care, confined to bed or chair more than 50% of waking hours [Normal] : grossly intact 1-2x/week

## 2023-09-14 ENCOUNTER — APPOINTMENT (OUTPATIENT)
Dept: CV DIAGNOSITCS | Facility: HOSPITAL | Age: 73
End: 2023-09-14

## 2023-09-14 ENCOUNTER — OUTPATIENT (OUTPATIENT)
Dept: OUTPATIENT SERVICES | Facility: HOSPITAL | Age: 73
LOS: 1 days | End: 2023-09-14
Payer: MEDICARE

## 2023-09-14 DIAGNOSIS — I35.0 NONRHEUMATIC AORTIC (VALVE) STENOSIS: ICD-10-CM

## 2023-09-14 DIAGNOSIS — Z98.49 CATARACT EXTRACTION STATUS, UNSPECIFIED EYE: Chronic | ICD-10-CM

## 2023-09-14 PROCEDURE — 93306 TTE W/DOPPLER COMPLETE: CPT | Mod: 26

## 2023-10-06 ENCOUNTER — APPOINTMENT (OUTPATIENT)
Dept: UROGYNECOLOGY | Facility: CLINIC | Age: 73
End: 2023-10-06

## 2023-10-13 NOTE — ED ADULT TRIAGE NOTE - PAIN RATING/NUMBER SCALE (0-10): ACTIVITY
Pt with known ICM dating back to 2021 (in our records) with an EF of 20-25%.  He presented to OSH with dHF without any improvement in kidney function.  Presents to [facility] on primacor 0.375    [facility] 10/10/2023:  RA: 13/ 10/ 9 RV: 73/ 9/ 18 PA: 75/ 36/ 53 PWP: 33/ 49/ 35 .   Cardiac output was 2.33 by thermodilution. Cardiac index is 1.15 L/min/m2.    - d/c primacor given his IVÁN.  Start  at 5  - pt is currently euvolemic on subjectively and clinically.  CVP on bedside TTE was 3 but hard to believe his hemodynamics improved so quickly given [facility] hemodynamics from 10/10/2023.  Will hold on lasix for now  - make NPO at midnight for [facility] tomorrow (consents will need to be obtained along with case request)  - hold home GDMT: coreg 6.25mg BID, jardiance 10mg qD, entresto 24-26mg BID, and spironolactone (unclear dose)   3

## 2023-10-19 ENCOUNTER — APPOINTMENT (OUTPATIENT)
Dept: UROGYNECOLOGY | Facility: CLINIC | Age: 73
End: 2023-10-19
Payer: MEDICARE

## 2023-10-19 VITALS — HEART RATE: 76 BPM | DIASTOLIC BLOOD PRESSURE: 70 MMHG | SYSTOLIC BLOOD PRESSURE: 160 MMHG

## 2023-10-19 VITALS — WEIGHT: 109 LBS | BODY MASS INDEX: 21.4 KG/M2 | HEIGHT: 60 IN

## 2023-10-19 DIAGNOSIS — N89.8 OTHER SPECIFIED NONINFLAMMATORY DISORDERS OF VAGINA: ICD-10-CM

## 2023-10-19 PROCEDURE — 99213 OFFICE O/P EST LOW 20 MIN: CPT

## 2023-11-14 ENCOUNTER — APPOINTMENT (OUTPATIENT)
Dept: CARDIOLOGY | Facility: CLINIC | Age: 73
End: 2023-11-14
Payer: MEDICARE

## 2023-11-14 PROCEDURE — 99212 OFFICE O/P EST SF 10 MIN: CPT | Mod: 95

## 2023-11-21 NOTE — PATIENT PROFILE ADULT - NSPROPTRIGHTBILLOFRIGHTS_GEN_A_NUR
-- DO NOT REPLY / DO NOT REPLY ALL --  -- Message is from Engagement Center Operations (ECO) --    Offered Waitlist if Available for the Visit Type? Yes    Caller is requesting an appointment - at a sooner time than what was available.      Caller wants sooner appointment - offered other approved options    Reason for Visit: stomach ache/vomiting    Is the patient currently scheduled? No    Preferred time to be seen: today    Caller Information       Type Contact Phone/Fax    11/21/2023 09:55 AM CST Phone (Incoming) BUDDY WILKS (Mother) [phone]          Alternative phone number: no    Can a detailed message be left? Yes    Message Turnaround:     [place]:    Please give this turnaround time to the caller:   \"This message will be sent to [state Provider's name]. The clinical team will fulfill your request as soon as they review your message.\"  
Returned call back to mother. Child with c/o HA last night and woke up this am with stomach pain and vomited x6, so far today. No fever. Taking sips of water at this time. No other sick contacts. Informed mother that we had no available appointments today. Mother will take child to [facility] today.   
patient

## 2024-01-19 ENCOUNTER — APPOINTMENT (OUTPATIENT)
Dept: UROGYNECOLOGY | Facility: CLINIC | Age: 74
End: 2024-01-19
Payer: MEDICARE

## 2024-01-19 VITALS
HEIGHT: 60 IN | DIASTOLIC BLOOD PRESSURE: 70 MMHG | WEIGHT: 107 LBS | SYSTOLIC BLOOD PRESSURE: 157 MMHG | BODY MASS INDEX: 21.01 KG/M2

## 2024-01-19 PROCEDURE — 99213 OFFICE O/P EST LOW 20 MIN: CPT

## 2024-01-19 NOTE — PROCEDURE
[Good Fit] : fits well [Refit] : refit is not needed [Erosion] : no evidence of erosion [Erythema] : no erythema [Discharge] : there is vaginal discharge [Infection] : no evidence of infection [Pessary Inserted] : inserted [Pessary Washed] : washed [H2O] : H2O [None] : no bleeding [Medication Review] : Medicaiton Review: Patient verbalizes understanding of risks and benefits [Bowel Management] : Bowel Management: patient verbalizes understanding of daily dietary fiber intake [FreeTextEntry1] : Sonal# 4 (2.50) [de-identified] : scant yellow discharge [FreeTextEntry3] : Premarin cream [FreeTextEntry8] : Reviewed [facility]

## 2024-01-19 NOTE — HISTORY OF PRESENT ILLNESS
[FreeTextEntry1] : May, 72y/o presents to the office for follow up for pelvic prolapse,supported with Sonal #4 (2.50). Patient happy with the support. She denies any pelvic pain, pressure or vaginal bleeding.  She denies any urinary complaints. She is using Premarin cream twice a week.

## 2024-01-24 ENCOUNTER — NON-APPOINTMENT (OUTPATIENT)
Age: 74
End: 2024-01-24

## 2024-01-24 ENCOUNTER — APPOINTMENT (OUTPATIENT)
Dept: CARDIOLOGY | Facility: CLINIC | Age: 74
End: 2024-01-24
Payer: MEDICARE

## 2024-01-24 VITALS
DIASTOLIC BLOOD PRESSURE: 68 MMHG | OXYGEN SATURATION: 100 % | HEIGHT: 60 IN | WEIGHT: 109.2 LBS | HEART RATE: 80 BPM | SYSTOLIC BLOOD PRESSURE: 152 MMHG | BODY MASS INDEX: 21.44 KG/M2

## 2024-01-24 DIAGNOSIS — I35.0 NONRHEUMATIC AORTIC (VALVE) STENOSIS: ICD-10-CM

## 2024-01-24 DIAGNOSIS — I10 ESSENTIAL (PRIMARY) HYPERTENSION: ICD-10-CM

## 2024-01-24 DIAGNOSIS — I25.10 ATHEROSCLEROTIC HEART DISEASE OF NATIVE CORONARY ARTERY W/OUT ANGINA PECTORIS: ICD-10-CM

## 2024-01-24 PROCEDURE — 93000 ELECTROCARDIOGRAM COMPLETE: CPT

## 2024-01-24 PROCEDURE — 99214 OFFICE O/P EST MOD 30 MIN: CPT | Mod: 25

## 2024-01-24 RX ORDER — METOCLOPRAMIDE 5 MG/1
5 TABLET ORAL EVERY 8 HOURS
Qty: 15 | Refills: 0 | Status: DISCONTINUED | COMMUNITY
Start: 2021-10-11 | End: 2024-01-24

## 2024-01-24 NOTE — DISCUSSION/SUMMARY
[FreeTextEntry1] : 1 CAD: continue current meds with aspirin 81 mg daily atorvastatin 40 mg daily.  2.  HTN: Normal BP readings at home. continue current meds  Losartan/HCTZ/Coreg  3. preop: optimized for cataract surgery. continue all meds, including ASA.   4. Mild-moderate AS, no symptom, repeat TTE in2-3 yrs.  [EKG obtained to assist in diagnosis and management of assessed problem(s)] : EKG obtained to assist in diagnosis and management of assessed problem(s)

## 2024-01-24 NOTE — CARDIOLOGY SUMMARY
[de-identified] : Sinus rhythm, LVH, repolarization changes [de-identified] : Ejection fraction 50 to 55%, aortic valve area 1.6 cm.

## 2024-01-24 NOTE — HISTORY OF PRESENT ILLNESS
[FreeTextEntry1] : 73 yr F HTN, s/p CABG Patient denies chest pain, dyspnea, orthopnea, PND, edema, palpitations, syncope or presyncope.  Patient is compliant with their medications. going for cataract surgery.

## 2024-01-24 NOTE — PHYSICAL EXAM
[Well Developed] : well developed [Normal Conjunctiva] : normal conjunctiva [Normal Venous Pressure] : normal venous pressure [Normal Gait] : normal gait [No Rash] : no rash [Moves all extremities] : moves all extremities [de-identified] : 2 out of 6 ejection systolic murmur at the aortic area. [de-identified] : Lungs are clear chest wound seems to be healing well [de-identified] : Left thigh at the site of the surgical harvest site there is a little swelling under the surgical wound.

## 2024-02-29 ENCOUNTER — APPOINTMENT (OUTPATIENT)
Dept: UROGYNECOLOGY | Facility: CLINIC | Age: 74
End: 2024-02-29
Payer: MEDICARE

## 2024-02-29 DIAGNOSIS — N95.2 POSTMENOPAUSAL ATROPHIC VAGINITIS: ICD-10-CM

## 2024-02-29 PROCEDURE — 99213 OFFICE O/P EST LOW 20 MIN: CPT

## 2024-02-29 NOTE — HISTORY OF PRESENT ILLNESS
[FreeTextEntry1] : May, 72y/o presents to the office for follow up for pelvic prolapse.  Supported with Sonal #4 (2.50). Patient happy with the support.  Today she endorses that she was straining yesterday with bowel movement and the pessary fell out.  She denies any pelvic pain, pressure or vaginal bleeding.  She denies any urinary complaints. She has occasional constipation that she manages with Colace.  She is using Premarin cream twice a week.

## 2024-02-29 NOTE — PROCEDURE
[Good Fit] : fits well [Discharge] : there is vaginal discharge [Pessary Inserted] : inserted [Pessary Washed] : washed [None] : no bleeding [Medication Review] : Medicaiton Review: Patient verbalizes understanding of risks and benefits [Bowel Management] : Bowel Management: patient verbalizes understanding of daily dietary fiber intake [Refit] : refit is not needed [Erosion] : no evidence of erosion [Erythema] : no erythema [Infection] : no evidence of infection [FreeTextEntry1] : Sonal# 4 (2.50) [de-identified] : scant yellow discharge [FreeTextEntry3] : Premarin cream [FreeTextEntry8] : Reviewed [facility]

## 2024-02-29 NOTE — PHYSICAL EXAM
[No Acute Distress] : in no acute distress [Well developed] : well developed [Well Nourished] : ~L well nourished [Good Hygeine] : demonstrates good hygeine [Oriented x3] : oriented to person, place, and time [Normal Memory] : ~T memory was ~L unimpaired [Normal Mood/Affect] : mood and affect are normal [Warm and Dry] : was warm and dry to touch [Normal Gait] : gait was normal [Vulvar Atrophy] : vulvar atrophy [Labia Majora] : were normal [Labia Minora] : were normal [Normal] : was normal [Normal Appearance] : general appearance was normal [Atrophy] : atrophy [Cystocele] : a cystocele [Uterine Prolapse] : uterine prolapse [Discharge] : a  ~M vaginal discharge was present [Scant] : scant [Laura] : yellow [Thin] : thin [No Bleeding] : there was no active vaginal bleeding [Anxiety] : patient is not anxious

## 2024-02-29 NOTE — DISCUSSION/SUMMARY
[FreeTextEntry1] : Pelvic prolapse: - Continue with Shaatz # 4. - Pessary Precautions and instructions were reviewed.  Vaginal atrophy: - Continue use of Premarin cream BIW. - May use vaginal lubrication on alternative days.  Follow up in 3 months or sooner if needed   If pt have any problems/concern to call office. PT aware and agrees.

## 2024-03-29 ENCOUNTER — EMERGENCY (EMERGENCY)
Facility: HOSPITAL | Age: 74
LOS: 1 days | Discharge: HOME | End: 2024-03-29
Attending: EMERGENCY MEDICINE
Payer: COMMERCIAL

## 2024-03-29 VITALS
HEART RATE: 75 BPM | TEMPERATURE: 98 F | RESPIRATION RATE: 19 BRPM | SYSTOLIC BLOOD PRESSURE: 161 MMHG | WEIGHT: 110.01 LBS | DIASTOLIC BLOOD PRESSURE: 64 MMHG | OXYGEN SATURATION: 99 % | HEIGHT: 60 IN

## 2024-03-29 VITALS
SYSTOLIC BLOOD PRESSURE: 141 MMHG | HEART RATE: 67 BPM | OXYGEN SATURATION: 97 % | TEMPERATURE: 98 F | DIASTOLIC BLOOD PRESSURE: 67 MMHG | RESPIRATION RATE: 16 BRPM

## 2024-03-29 DIAGNOSIS — Z98.49 CATARACT EXTRACTION STATUS, UNSPECIFIED EYE: Chronic | ICD-10-CM

## 2024-03-29 LAB
ALBUMIN SERPL ELPH-MCNC: 4.1 G/DL — SIGNIFICANT CHANGE UP (ref 3.3–5)
ALP SERPL-CCNC: 85 U/L — SIGNIFICANT CHANGE UP (ref 40–120)
ALT FLD-CCNC: 13 U/L — SIGNIFICANT CHANGE UP (ref 10–45)
ANION GAP SERPL CALC-SCNC: 14 MMOL/L — SIGNIFICANT CHANGE UP (ref 5–17)
APTT BLD: 35.1 SEC — SIGNIFICANT CHANGE UP (ref 24.5–35.6)
AST SERPL-CCNC: 22 U/L — SIGNIFICANT CHANGE UP (ref 10–40)
BASOPHILS # BLD AUTO: 0.03 K/UL — SIGNIFICANT CHANGE UP (ref 0–0.2)
BASOPHILS NFR BLD AUTO: 0.5 % — SIGNIFICANT CHANGE UP (ref 0–2)
BILIRUB SERPL-MCNC: 0.4 MG/DL — SIGNIFICANT CHANGE UP (ref 0.2–1.2)
BUN SERPL-MCNC: 43 MG/DL — HIGH (ref 7–23)
CALCIUM SERPL-MCNC: 9.3 MG/DL — SIGNIFICANT CHANGE UP (ref 8.4–10.5)
CHLORIDE SERPL-SCNC: 101 MMOL/L — SIGNIFICANT CHANGE UP (ref 96–108)
CO2 SERPL-SCNC: 24 MMOL/L — SIGNIFICANT CHANGE UP (ref 22–31)
CREAT SERPL-MCNC: 1.09 MG/DL — SIGNIFICANT CHANGE UP (ref 0.5–1.3)
EGFR: 53 ML/MIN/1.73M2 — LOW
EOSINOPHIL # BLD AUTO: 0.15 K/UL — SIGNIFICANT CHANGE UP (ref 0–0.5)
EOSINOPHIL NFR BLD AUTO: 2.6 % — SIGNIFICANT CHANGE UP (ref 0–6)
GLUCOSE SERPL-MCNC: 133 MG/DL — HIGH (ref 70–99)
HCT VFR BLD CALC: 29.6 % — LOW (ref 34.5–45)
HGB BLD-MCNC: 9.6 G/DL — LOW (ref 11.5–15.5)
IMM GRANULOCYTES NFR BLD AUTO: 0.3 % — SIGNIFICANT CHANGE UP (ref 0–0.9)
INR BLD: 1.04 RATIO — SIGNIFICANT CHANGE UP (ref 0.85–1.18)
LYMPHOCYTES # BLD AUTO: 1.36 K/UL — SIGNIFICANT CHANGE UP (ref 1–3.3)
LYMPHOCYTES # BLD AUTO: 23.6 % — SIGNIFICANT CHANGE UP (ref 13–44)
MCHC RBC-ENTMCNC: 29.4 PG — SIGNIFICANT CHANGE UP (ref 27–34)
MCHC RBC-ENTMCNC: 32.4 GM/DL — SIGNIFICANT CHANGE UP (ref 32–36)
MCV RBC AUTO: 90.5 FL — SIGNIFICANT CHANGE UP (ref 80–100)
MONOCYTES # BLD AUTO: 0.54 K/UL — SIGNIFICANT CHANGE UP (ref 0–0.9)
MONOCYTES NFR BLD AUTO: 9.4 % — SIGNIFICANT CHANGE UP (ref 2–14)
NEUTROPHILS # BLD AUTO: 3.66 K/UL — SIGNIFICANT CHANGE UP (ref 1.8–7.4)
NEUTROPHILS NFR BLD AUTO: 63.6 % — SIGNIFICANT CHANGE UP (ref 43–77)
NRBC # BLD: 0 /100 WBCS — SIGNIFICANT CHANGE UP (ref 0–0)
PLATELET # BLD AUTO: 181 K/UL — SIGNIFICANT CHANGE UP (ref 150–400)
POTASSIUM SERPL-MCNC: 4 MMOL/L — SIGNIFICANT CHANGE UP (ref 3.5–5.3)
POTASSIUM SERPL-SCNC: 4 MMOL/L — SIGNIFICANT CHANGE UP (ref 3.5–5.3)
PROT SERPL-MCNC: 7.8 G/DL — SIGNIFICANT CHANGE UP (ref 6–8.3)
PROTHROM AB SERPL-ACNC: 11.4 SEC — SIGNIFICANT CHANGE UP (ref 9.5–13)
RBC # BLD: 3.27 M/UL — LOW (ref 3.8–5.2)
RBC # FLD: 13.9 % — SIGNIFICANT CHANGE UP (ref 10.3–14.5)
SODIUM SERPL-SCNC: 139 MMOL/L — SIGNIFICANT CHANGE UP (ref 135–145)
WBC # BLD: 5.76 K/UL — SIGNIFICANT CHANGE UP (ref 3.8–10.5)
WBC # FLD AUTO: 5.76 K/UL — SIGNIFICANT CHANGE UP (ref 3.8–10.5)

## 2024-03-29 PROCEDURE — 85730 THROMBOPLASTIN TIME PARTIAL: CPT

## 2024-03-29 PROCEDURE — 80053 COMPREHEN METABOLIC PANEL: CPT

## 2024-03-29 PROCEDURE — 85610 PROTHROMBIN TIME: CPT

## 2024-03-29 PROCEDURE — 99284 EMERGENCY DEPT VISIT MOD MDM: CPT

## 2024-03-29 PROCEDURE — 85025 COMPLETE CBC W/AUTO DIFF WBC: CPT

## 2024-03-29 PROCEDURE — 36415 COLL VENOUS BLD VENIPUNCTURE: CPT

## 2024-03-29 NOTE — ED PROVIDER NOTE - PATIENT PORTAL LINK FT
You can access the FollowMyHealth Patient Portal offered by [facility] by registering at the following website: [email or web address]. By joining [facility]’s FollowMyHealth portal, you will also be able to view your health information using other applications (apps) compatible with our system.

## 2024-03-29 NOTE — ED PROVIDER NOTE - CLINICAL SUMMARY MEDICAL DECISION MAKING FREE TEXT BOX
Dr. 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  Will check basic labs to look at coags and platelets.  If labs normal will set up with outpatient referral to dermatology for possible biopsy.

## 2024-03-29 NOTE — ED PROVIDER NOTE - ATTENDING APP SHARED VISIT CONTRIBUTION OF CARE
Dr. Zabala: I performed a face to face bedside interview with patient regarding history of present illness, review of symptoms and past medical history. I completed an independent physical exam.  I have discussed patient's plan of care with PA.   I agree with note as stated above, having amended the EMR as needed to reflect my findings.   This includes HISTORY OF PRESENT ILLNESS, HIV, PAST MEDICAL/SURGICAL/FAMILY/SOCIAL HISTORY, ALLERGIES AND HOME MEDICATIONS, REVIEW OF SYSTEMS, PHYSICAL EXAM, and any PROGRESS NOTES during the time I functioned as the attending physician for this patient.    Dr. Zabala: This H&P has been written by myself in its entirety

## 2024-03-29 NOTE — ED PROVIDER NOTE - OBJECTIVE STATEMENT
Dr. Zabala: 73-year-old female history of CAD status post CABG on aspirin, diabetes, hypertension, hyperlipidemia, no recent change in medications and no exposure to new medications or lotions or detergents, here with  presenting with 3 days of atraumatic bilateral lower extremity erythema. No shaving or waxing of legs.  Not painful, not itchy, no fevers or chills, no other rashes, no oral lesions, no other symptoms.  Patient spoke to her daughter who is an NP at [facility] as well as her PMD and was told to come to the emergency department today.

## 2024-03-29 NOTE — ED PROVIDER NOTE - NSFOLLOWUPCLINICS_GEN_ALL_ED_FT
[facility] [place]  [facility]at[facility]  [address], [address]  [place] [address]  Phone: [phone]  Fax: [phone]  Follow Up Time: 4-6 Days

## 2024-03-29 NOTE — ED ADULT NURSE NOTE - SKIN TEMPERATURE MOISTURE
----- Message from Anny Cisse sent at 2020  1:15 PM EDT -----  Regarding: Dr. Benitez See Message/Vendor Calls  To: [facility]  Subject: Dr. Arun Avila  Patient's first and last name: Catia Lawton   ID numbers: #[id number] P#[id number]  : 1954  Caller's first and last name: N/A  Reason for call: Pt wants Dr. Idalia Stokes or Nurse to call her back concerning her prescriptions. Callback required yes/no and why: Yes, to inform.    Best contact number(s): [phone]  Details to clarify the request: N/A        Copy/paste envera
warm

## 2024-03-29 NOTE — ED PROVIDER NOTE - NSFOLLOWUPINSTRUCTIONS_ED_ALL_ED_FT
May take benadryl 25-50mg once every 8 hours as needed for itchiness.     Follow up with your Primary Care Provider upon discharge and follow up with Dermatology at your upcoming appointment on 4/5/24 for further evaluation.    You may follow up with [facility]: call [phone] to make appointment.   Bring a copy of your test results (attached along with your discharge paperwork) with you to your appointment for further discussion, evaluation, and comparison with your prior results.    Please return to the Emergency Department immediately for any new, worsening, or concerning symptoms.      -----------------------    English    Vasculitis  Side view of the brain with a close-up of inflamed blood vessels.  Vasculitis is inflammation of the blood vessels. It can cause the blood vessels to become thick, narrow, scarred, or weak. It can also reduce blood flow. This can cause damage to the muscles, kidneys, lungs, brain, and other parts of the body.    There are many types of vasculitis. Different types may affect different kinds of blood vessels or parts of the body. Some types last only a short time. Others last a long time.    What are the causes?  The exact cause of vasculitis is not known. In some cases, it can happen when the body's defense system (immune system) starts to attack its own blood vessels. This attack can be caused by:  An infection.  An immune system disease, such as lupus, rheumatoid arthritis, or scleroderma.  An allergic reaction to a medicine.  A cancer that affects blood cells, such as leukemia or lymphoma.  What increases the risk?  You may be more likely to get this condition if:  You smoke.  You are under stress.  You have a physical injury.  What are the signs or symptoms?  Symptoms depend on the type of vasculitis that you have. Common symptoms include:  Fever.  Poor appetite.  Weight loss.  Feeling very tired (fatigue) or weak.  Having aches and pains.  Numbness in an area of your body.  Certain types of vasculitis may result in:  Skin problems, such as sores, spots, or rashes.  Trouble seeing.  Trouble breathing.  Coughing up blood.  Blood in your pee (urine).  Headaches.  Stomach pain.  A stuffy or bloody nose.  How is this diagnosed?  This condition may be diagnosed based on your symptoms and a physical exam. You may also have tests. These may include:  Blood tests.  A pee test.  A biopsy of a blood vessel. This is when a small part of the blood vessel is removed for testing.  A nerve conduction study. This is a test of the electrical signals that move through nerves.  Imaging tests, such as:  X-ray.  CT scan.  Ultrasound.  MRI.  Angiogram.  How is this treated?  A prescription pill bottle with an example of a pill.  In some cases, treatment is not needed. Treatment will be based on the type of vasculitis you have. If you have a mild case, common pain relievers might help. For serious cases, treatment may include:  Medicines that lower inflammation in your body.  Medicines that reduce the activity of the immune system (immunosuppressants).  You will need to see your health care provider while you are being treated. During follow-up visits, your provider may:  Do blood tests and bone density tests.  Check your blood pressure and blood sugar.  Check for side effects of any medicines you are taking.  Vasculitis cannot always be cured. In some cases, symptoms go away but the disease does not. If symptoms come back, more treatment may be needed.    Follow these instructions at home:  Take over-the-counter and prescription medicines only as told by your provider.  Follow a healthy diet. Eat fruits, vegetables, whole grains, and healthy sources of protein.  Rest as told by your provider. Talk with your provider about what exercises are safe for you.  Learn as much as you can about vasculitis.  Think about joining a support group. You may need to:  Talk to others who have vasculitis. This may help you manage your condition.  Talk with your provider if you feel stressed, worried, or depressed.  Where to find more information  Vasculitis Foundation: vasculitisfoundation.org  Contact a health care provider if:  Your symptoms come back.  You have new symptoms.  Your fever, fatigue, headache, or weight loss gets worse.  You have signs of infection. These may include redness, swelling, tenderness, warmth, or a new fever.  Your pain does not go away, even after you take pain medicine.  Your nose bleeds.  Get help right away if:  Your vision gets worse.  You have chest pain or stomach pain.  You have trouble breathing.  One side of your face or body becomes weak or numb all of a sudden.  There is blood in your pee.  These symptoms may be an emergency. Get help right away. Call 911.  Do not wait to see if the symptoms will go away.  Do not drive yourself to the hospital.  This information is not intended to replace advice given to you by your health care provider. Make sure you discuss any questions you have with your health care provider.    Document Revised: 01/02/2024 Document Reviewed: 01/02/2024  Elsevier Patient Education © 2024 Elsevier Inc.

## 2024-03-29 NOTE — ED ADULT NURSE NOTE - OBJECTIVE STATEMENT
Pt is 73y F with PMH HTN, HLD, CABG, complaining of redness to legs. Pt reports onset of petechiae-like red spots on BLE x 3 days ago. Denies trauma, itchiness, blistering, swelling, bleeding. Denies bleeding gums. Denies fevers, chills. Upon assessment, A&Ox4, petechiae across bilateral calves, pedal pulses present, no swelling. Denies chest pain, SOB, n/v, abdominal pain, back pain. Vitals WNL.  at bedside. Pt is 73y F with PMH DM, HTN, HLD, CABG, complaining of redness to legs. Pt reports onset of erythema red spots on BLE x 3 days ago. Denies trauma, itchiness, blistering, swelling, bleeding. Denies bleeding gums. Denies fevers, chills. Denies exposure to allergens, no changes in medications, detergents, clothing. Upon assessment, A&Ox4, erythema across bilateral lower extremities, pedal pulses present, no swelling. Denies chest pain, SOB, n/v, abdominal pain, back pain. Vitals WNL.  at bedside.

## 2024-03-29 NOTE — ED PROVIDER NOTE - PHYSICAL EXAMINATION
Gen: No acute distress  HEENT: Mucous membranes moist, pink conjunctivae, EOMI  CV: RRR, no clubbing/cyanosis/edema  Resp: CTAB  GI: Abdomen soft, NT, ND. Normal BS. No rebound, no guarding  : No CVAT  Neuro: A&O x 3, moving all 4 extremities  MSK: No spine or joint tenderness to palpation  Skin: Bilateral lower extremity petechiae, nonpalpable, nontender, nonpruritic.  No other rashes. No warmth Gen: No acute distress  HEENT: Mucous membranes moist, pink conjunctivae, EOMI  CV: RRR, +systolic murmur, no clubbing/cyanosis/edema  Resp: CTAB  GI: Abdomen soft, NT, ND. Normal BS. No rebound, no guarding  : No CVAT  Neuro: A&O x 3, moving all 4 extremities  MSK: No spine or joint tenderness to palpation  Skin: Bilateral lower extremity petechiae, nonpalpable, nontender, nonpruritic.  No other rashes. No warmth

## 2024-03-29 NOTE — ED PROVIDER NOTE - PROGRESS NOTE DETAILS
Dr. Zabala: Anemia noted, compared in [facility] - baseline. Derm appt confirmed in one week. Pt seen by Mr. Dickerson and f/u Derm appointment made. Pt stable for d/c. - Lorenza Travis PA-C

## 2024-04-05 ENCOUNTER — APPOINTMENT (OUTPATIENT)
Dept: DERMATOLOGY | Facility: CLINIC | Age: 74
End: 2024-04-05
Payer: MEDICARE

## 2024-04-05 DIAGNOSIS — L85.3 XEROSIS CUTIS: ICD-10-CM

## 2024-04-05 DIAGNOSIS — R21 RASH AND OTHER NONSPECIFIC SKIN ERUPTION: ICD-10-CM

## 2024-04-05 PROCEDURE — 99203 OFFICE O/P NEW LOW 30 MIN: CPT

## 2024-04-13 NOTE — PRE-OP CHECKLIST - TAMPON REMOVED
46yoM with complex/recurrent nonhealing infected wound left leg s/p I&D and secondary closure with incisional VAC 4/11.    S: doing okay, had migraine and hypertensive urgency last night now better controlled after hospitalist consulted for comanagement.    O:    Vitals:    04/12/24 1501   BP: 104/58   Pulse: 80   Resp: 16   Temp: 36.4 °C (97.5 °F)   SpO2: 92%     Exam:  General-NAD, alert and following commands  LLE-dressing c/d/I, VAC in place with good seal, NVI distally    A: 46yoM with complex/recurrent nonhealing infected wound left leg s/p I&D and secondary closure with incisional VAC 4/11.  Wound cx growing MRSA.    Recs:  --ID consultation for definitive abx recommendations  --continue incisional VAC  --okay for discharge to home with outpatient vac, outpatient wound care and plan for abx therapy  --fu 2 weeks postop as scheduled       n/a

## 2024-05-29 ENCOUNTER — APPOINTMENT (OUTPATIENT)
Dept: UROGYNECOLOGY | Facility: CLINIC | Age: 74
End: 2024-05-29
Payer: MEDICARE

## 2024-05-29 VITALS
WEIGHT: 109.5 LBS | HEART RATE: 80 BPM | DIASTOLIC BLOOD PRESSURE: 70 MMHG | BODY MASS INDEX: 21.5 KG/M2 | SYSTOLIC BLOOD PRESSURE: 156 MMHG | HEIGHT: 60 IN

## 2024-05-29 DIAGNOSIS — N95.2 POSTMENOPAUSAL ATROPHIC VAGINITIS: ICD-10-CM

## 2024-05-29 DIAGNOSIS — N81.2 INCOMPLETE UTEROVAGINAL PROLAPSE: ICD-10-CM

## 2024-05-29 PROCEDURE — 99213 OFFICE O/P EST LOW 20 MIN: CPT

## 2024-05-29 PROCEDURE — G2211 COMPLEX E/M VISIT ADD ON: CPT

## 2024-05-29 NOTE — HISTORY OF PRESENT ILLNESS
[FreeTextEntry1] : May, 74y/o presents to the office for follow up for pelvic prolapse.  Supported with Sonal #4 (2.50). Patient happy with the support.  She denies any pelvic pain or pressure but does report few episodes of spotting after constipation episodes.  Pt endorses that she is anemic and on iron and this makes her very constipated; she manages using Colace but it is not enough.  She reports spotting in her pad and toilet paper 1-2 times a day since last week that remained the same; the spotting has not gotten worse or better.  She denies any urinary complaints. She is using Premarin cream twice a week.

## 2024-05-29 NOTE — PHYSICAL EXAM
[No Acute Distress] : in no acute distress [Well developed] : well developed [Well Nourished] : ~L well nourished [Good Hygeine] : demonstrates good hygeine [Oriented x3] : oriented to person, place, and time [Normal Memory] : ~T memory was ~L unimpaired [Normal Mood/Affect] : mood and affect are normal [Warm and Dry] : was warm and dry to touch [Normal Gait] : gait was normal [Vulvar Atrophy] : vulvar atrophy [Labia Majora] : were normal [Labia Minora] : were normal [Normal] : was normal [Normal Appearance] : general appearance was normal [Atrophy] : atrophy [Cystocele] : a cystocele [Uterine Prolapse] : uterine prolapse [Discharge] : a  ~M vaginal discharge was present [Laura] : yellow [Thin] : thin [Erythematous] : erythema [Scant] : there was scant vaginal bleeding [Anxiety] : patient is not anxious

## 2024-05-29 NOTE — DISCUSSION/SUMMARY
[FreeTextEntry1] : Pelvic prolapse: - Shaatz # 4 left out for pelvic rest.  - Pessary Precautions and instructions were reviewed. - Spotting most likely from area of irritation and erythema at posterior vaginal vault caused by pessary, especially with her hx of constipation.  Pt will follow-up with hemonc about the iron and constipation.    Vaginal atrophy: - Continue use of Premarin cream BIW. - May use vaginal lubrication on alternative days.  Follow up in 2-3 weeks or sooner if needed.  If pt have any problems/concern to call office. Pt aware and agrees.

## 2024-05-29 NOTE — PROCEDURE
[Good Fit] : fits well [Discharge] : there is vaginal discharge [Medication Review] : Medicaiton Review: Patient verbalizes understanding of risks and benefits [Bowel Management] : Bowel Management: patient verbalizes understanding of daily dietary fiber intake [Erythema] : erythema noted [Pessary Out] : removed [Pessary Washed] : washed [Mild] : mild bleeding [Resolved w/pressure] : was resolved by applying pressure [Refit] : refit is not needed [Erosion] : no evidence of erosion [Infection] : no evidence of infection [FreeTextEntry1] : Sonal# 4 (2.50) [de-identified] : posterior vaginal vault  [de-identified] : scant yellow discharge [de-identified] : Scant bleeding noted after pessary removal and speculum exam at posterior vaginal vault where there is also small area of erythema likely due to irritation from pessary.  Stopped with pressure.  [FreeTextEntry3] : Premarin cream [FreeTextEntry8] : Reviewed [facility]

## 2024-06-14 ENCOUNTER — APPOINTMENT (OUTPATIENT)
Dept: UROGYNECOLOGY | Facility: CLINIC | Age: 74
End: 2024-06-14
Payer: MEDICARE

## 2024-06-14 VITALS
HEIGHT: 60 IN | SYSTOLIC BLOOD PRESSURE: 145 MMHG | DIASTOLIC BLOOD PRESSURE: 67 MMHG | WEIGHT: 110 LBS | BODY MASS INDEX: 21.6 KG/M2 | HEART RATE: 64 BPM

## 2024-06-14 DIAGNOSIS — N81.11 CYSTOCELE, MIDLINE: ICD-10-CM

## 2024-06-14 PROCEDURE — 99213 OFFICE O/P EST LOW 20 MIN: CPT

## 2024-06-14 PROCEDURE — 99459 PELVIC EXAMINATION: CPT

## 2024-06-14 NOTE — HISTORY OF PRESENT ILLNESS
[FreeTextEntry1] : May is a 72y/o female who presents to the office for follow up for pelvic prolapse, supported with Sonal #4 (2.50). She is overall happy with the support.  She denies any pelvic pain or pressure but does report few episodes of spotting after constipation episodes.  Pessary was removed last visit due to spotting.  She denies any urinary complaints. She is using Premarin cream twice a week.

## 2024-06-14 NOTE — DISCUSSION/SUMMARY
[FreeTextEntry1] : Pelvic prolapse: -Continue with Shaatz # 4  - Pessary Precautions and instructions were reviewed.    Vaginal atrophy: - Continue use of Premarin cream BIW. - May use vaginal lubrication on alternative days.  Follow up in 3 months or sooner if needed.  If pt have any problems/concern to call office. Pt aware and agrees.

## 2024-06-14 NOTE — PHYSICAL EXAM
[No Acute Distress] : in no acute distress [Well developed] : well developed [Well Nourished] : ~L well nourished [Good Hygeine] : demonstrates good hygeine [Oriented x3] : oriented to person, place, and time [Normal Memory] : ~T memory was ~L unimpaired [Normal Mood/Affect] : mood and affect are normal [Warm and Dry] : was warm and dry to touch [Normal Gait] : gait was normal [Vulvar Atrophy] : vulvar atrophy [Labia Majora] : were normal [Labia Minora] : were normal [Normal] : was normal [Normal Appearance] : general appearance was normal [Atrophy] : atrophy [Erythematous] : erythema [Cystocele] : a cystocele [Uterine Prolapse] : uterine prolapse [Discharge] : a  ~M vaginal discharge was present [Laura] : yellow [Thin] : thin [Scant] : there was scant vaginal bleeding [Chaperone Present] : A chaperone was present in the examining room during all aspects of the physical examination [[id number]] : A chaperone was present during the pelvic exam. [FreeTextEntry2] : Cira  [Anxiety] : patient is not anxious

## 2024-06-14 NOTE — PROCEDURE
[Good Fit] : fits well [Pessary Inserted] : inserted [Pessary Washed] : washed [Medication Review] : Medicaiton Review: Patient verbalizes understanding of risks and benefits [Bowel Management] : Bowel Management: patient verbalizes understanding of daily dietary fiber intake [Refit] : refit is not needed [Erosion] : no evidence of erosion [Erythema] : no erythema [Discharge] : no vaginal discharge [Infection] : no evidence of infection [FreeTextEntry1] : Sonal# 4 (2.50) [FreeTextEntry3] : Premarin cream [FreeTextEntry8] : Reviewed [facility]

## 2024-07-24 ENCOUNTER — APPOINTMENT (OUTPATIENT)
Dept: CARDIOLOGY | Facility: CLINIC | Age: 74
End: 2024-07-24
Payer: MEDICARE

## 2024-07-24 ENCOUNTER — NON-APPOINTMENT (OUTPATIENT)
Age: 74
End: 2024-07-24

## 2024-07-24 VITALS — DIASTOLIC BLOOD PRESSURE: 71 MMHG | SYSTOLIC BLOOD PRESSURE: 145 MMHG

## 2024-07-24 VITALS
OXYGEN SATURATION: 98 % | DIASTOLIC BLOOD PRESSURE: 64 MMHG | HEIGHT: 60 IN | SYSTOLIC BLOOD PRESSURE: 151 MMHG | BODY MASS INDEX: 21.4 KG/M2 | WEIGHT: 109 LBS | HEART RATE: 70 BPM

## 2024-07-24 DIAGNOSIS — I10 ESSENTIAL (PRIMARY) HYPERTENSION: ICD-10-CM

## 2024-07-24 DIAGNOSIS — I35.0 NONRHEUMATIC AORTIC (VALVE) STENOSIS: ICD-10-CM

## 2024-07-24 DIAGNOSIS — I25.10 ATHEROSCLEROTIC HEART DISEASE OF NATIVE CORONARY ARTERY W/OUT ANGINA PECTORIS: ICD-10-CM

## 2024-07-24 PROCEDURE — 99214 OFFICE O/P EST MOD 30 MIN: CPT | Mod: 25

## 2024-07-24 PROCEDURE — 93000 ELECTROCARDIOGRAM COMPLETE: CPT

## 2024-07-24 NOTE — PHYSICAL EXAM
[Well Developed] : well developed [Normal Conjunctiva] : normal conjunctiva [Normal Venous Pressure] : normal venous pressure [Normal Gait] : normal gait [No Rash] : no rash [Moves all extremities] : moves all extremities [de-identified] : 2 out of 6 ejection systolic murmur at the aortic area. [de-identified] : Lungs are clear chest wound seems to be healing well [de-identified] : Left thigh at the site of the surgical harvest site there is a little swelling under the surgical wound.

## 2024-07-24 NOTE — HISTORY OF PRESENT ILLNESS
[FreeTextEntry1] : 74 yr F HTN, s/p CABG Patient denies chest pain, dyspnea, orthopnea, PND, edema, palpitations, syncope or presyncope.  Patient is compliant with their medications.

## 2024-07-24 NOTE — DISCUSSION/SUMMARY
[FreeTextEntry1] : 1 CAD: continue current meds with aspirin 81 mg daily atorvastatin 40 mg daily.  2.  HTN: borderline  Normal BP readings at home. continue current meds  Losartan/HCTZ/Coreg  3. Mild-moderate AS, no symptom, repeat TTE in2-3 yrs.  [EKG obtained to assist in diagnosis and management of assessed problem(s)] : EKG obtained to assist in diagnosis and management of assessed problem(s)

## 2024-07-24 NOTE — CARDIOLOGY SUMMARY
[de-identified] : Sinus rhythm, LVH, repolarization changes [de-identified] : Ejection fraction 50 to 55%, aortic valve area 1.6 cm.

## 2024-09-12 ENCOUNTER — APPOINTMENT (OUTPATIENT)
Dept: UROGYNECOLOGY | Facility: CLINIC | Age: 74
End: 2024-09-12
Payer: MEDICARE

## 2024-09-12 DIAGNOSIS — N95.2 POSTMENOPAUSAL ATROPHIC VAGINITIS: ICD-10-CM

## 2024-09-12 DIAGNOSIS — N81.2 INCOMPLETE UTEROVAGINAL PROLAPSE: ICD-10-CM

## 2024-09-12 DIAGNOSIS — N81.11 CYSTOCELE, MIDLINE: ICD-10-CM

## 2024-09-12 PROCEDURE — 99213 OFFICE O/P EST LOW 20 MIN: CPT

## 2024-09-12 PROCEDURE — G2211 COMPLEX E/M VISIT ADD ON: CPT

## 2024-09-12 RX ORDER — ESTRADIOL 0.1 MG/G
0.1 CREAM VAGINAL AT BEDTIME
Qty: 1 | Refills: 3 | Status: ACTIVE | COMMUNITY
Start: 2024-09-12 | End: 1900-01-01

## 2024-09-12 NOTE — DISCUSSION/SUMMARY
[FreeTextEntry1] : Pelvic prolapse: -Continue with Shaatz # 4  - Pessary Precautions and instructions were reviewed.    Vaginal atrophy: - Continue use of estrogen cream BIW. - May use vaginal lubrication on alternative days.  Follow up in 3 months or sooner if needed.  If pt have any problems/concern to call office. Pt aware and agrees.

## 2024-09-12 NOTE — HISTORY OF PRESENT ILLNESS
[FreeTextEntry1] : May is a 73y/o female who presents to the office for follow up for pelvic prolapse, supported with Sonal #4 (2.50). She is overall happy with the support.  She denies any pelvic pain or bleeding.  She denies any urinary complaints.  She has hx of constipation.  She is using low dose estrogen cream twice a week at bedtime and request med renewal today.

## 2024-09-12 NOTE — PROCEDURE
[Good Fit] : fits well [Refit] : refit is not needed [Erosion] : no evidence of erosion [Erythema] : no erythema [Discharge] : there is vaginal discharge [Infection] : no evidence of infection [Pessary Inserted] : inserted [Pessary Washed] : washed [None] : no bleeding [Medication Review] : Medicaiton Review: Patient verbalizes understanding of risks and benefits [Bowel Management] : Bowel Management: patient verbalizes understanding of daily dietary fiber intake [FreeTextEntry1] : Sonal# 4 (2.50) [FreeTextEntry3] : Low dose estrogen cream [FreeTextEntry8] : Reviewed [facility]

## 2024-09-12 NOTE — PHYSICAL EXAM
[No Acute Distress] : in no acute distress [Well developed] : well developed [Well Nourished] : ~L well nourished [Good Hygeine] : demonstrates good hygeine [Oriented x3] : oriented to person, place, and time [Normal Memory] : ~T memory was ~L unimpaired [Normal Mood/Affect] : mood and affect are normal [Anxiety] : patient is not anxious [Warm and Dry] : was warm and dry to touch [Normal Gait] : gait was normal [Vulvar Atrophy] : vulvar atrophy [Labia Majora] : were normal [Labia Minora] : were normal [Normal] : was normal [Normal Appearance] : general appearance was normal [Atrophy] : atrophy [Cystocele] : a cystocele [Uterine Prolapse] : uterine prolapse [Discharge] : a  ~M vaginal discharge was present [Scant] : scant [Laura] : yellow [Thin] : thin [No Bleeding] : there was no active vaginal bleeding

## 2024-12-12 ENCOUNTER — APPOINTMENT (OUTPATIENT)
Dept: UROGYNECOLOGY | Facility: CLINIC | Age: 74
End: 2024-12-12
Payer: MEDICARE

## 2024-12-12 VITALS
WEIGHT: 111 LBS | BODY MASS INDEX: 21.79 KG/M2 | HEIGHT: 60 IN | SYSTOLIC BLOOD PRESSURE: 166 MMHG | DIASTOLIC BLOOD PRESSURE: 75 MMHG | HEART RATE: 70 BPM

## 2024-12-12 DIAGNOSIS — N81.11 CYSTOCELE, MIDLINE: ICD-10-CM

## 2024-12-12 DIAGNOSIS — N95.2 POSTMENOPAUSAL ATROPHIC VAGINITIS: ICD-10-CM

## 2024-12-12 PROCEDURE — 99213 OFFICE O/P EST LOW 20 MIN: CPT

## 2024-12-12 PROCEDURE — G2211 COMPLEX E/M VISIT ADD ON: CPT

## 2025-01-08 ENCOUNTER — APPOINTMENT (OUTPATIENT)
Dept: ORTHOPEDIC SURGERY | Facility: CLINIC | Age: 75
End: 2025-01-08
Payer: MEDICARE

## 2025-01-08 PROCEDURE — 99203 OFFICE O/P NEW LOW 30 MIN: CPT

## 2025-01-08 PROCEDURE — 73564 X-RAY EXAM KNEE 4 OR MORE: CPT | Mod: 50

## 2025-01-15 ENCOUNTER — APPOINTMENT (OUTPATIENT)
Dept: ORTHOPEDIC SURGERY | Facility: CLINIC | Age: 75
End: 2025-01-15
Payer: MEDICARE

## 2025-01-15 VITALS — WEIGHT: 111 LBS | HEIGHT: 60 IN | BODY MASS INDEX: 21.79 KG/M2

## 2025-01-15 DIAGNOSIS — M17.11 UNILATERAL PRIMARY OSTEOARTHRITIS, RIGHT KNEE: ICD-10-CM

## 2025-01-15 DIAGNOSIS — M17.12 UNILATERAL PRIMARY OSTEOARTHRITIS, LEFT KNEE: ICD-10-CM

## 2025-01-15 DIAGNOSIS — E11.9 TYPE 2 DIABETES MELLITUS W/OUT COMPLICATIONS: ICD-10-CM

## 2025-01-15 PROCEDURE — 99213 OFFICE O/P EST LOW 20 MIN: CPT

## 2025-01-16 ENCOUNTER — NON-APPOINTMENT (OUTPATIENT)
Age: 75
End: 2025-01-16

## 2025-01-22 ENCOUNTER — APPOINTMENT (OUTPATIENT)
Dept: CARDIOLOGY | Facility: CLINIC | Age: 75
End: 2025-01-22
Payer: MEDICARE

## 2025-01-22 ENCOUNTER — NON-APPOINTMENT (OUTPATIENT)
Age: 75
End: 2025-01-22

## 2025-01-22 VITALS
HEART RATE: 67 BPM | DIASTOLIC BLOOD PRESSURE: 73 MMHG | OXYGEN SATURATION: 100 % | TEMPERATURE: 97.7 F | BODY MASS INDEX: 21.79 KG/M2 | HEIGHT: 60 IN | WEIGHT: 111 LBS | SYSTOLIC BLOOD PRESSURE: 189 MMHG

## 2025-01-22 VITALS — SYSTOLIC BLOOD PRESSURE: 164 MMHG | DIASTOLIC BLOOD PRESSURE: 76 MMHG

## 2025-01-22 DIAGNOSIS — I25.10 ATHEROSCLEROTIC HEART DISEASE OF NATIVE CORONARY ARTERY W/OUT ANGINA PECTORIS: ICD-10-CM

## 2025-01-22 DIAGNOSIS — I10 ESSENTIAL (PRIMARY) HYPERTENSION: ICD-10-CM

## 2025-01-22 DIAGNOSIS — I35.0 NONRHEUMATIC AORTIC (VALVE) STENOSIS: ICD-10-CM

## 2025-01-22 PROCEDURE — 93000 ELECTROCARDIOGRAM COMPLETE: CPT

## 2025-01-22 PROCEDURE — 99214 OFFICE O/P EST MOD 30 MIN: CPT | Mod: 25

## 2025-01-27 ENCOUNTER — APPOINTMENT (OUTPATIENT)
Dept: ORTHOPEDIC SURGERY | Facility: CLINIC | Age: 75
End: 2025-01-27
Payer: MEDICARE

## 2025-01-27 VITALS — HEIGHT: 60 IN | BODY MASS INDEX: 21.79 KG/M2 | WEIGHT: 111 LBS

## 2025-01-27 PROCEDURE — 20611 DRAIN/INJ JOINT/BURSA W/US: CPT | Mod: 50

## 2025-01-27 PROCEDURE — 99213 OFFICE O/P EST LOW 20 MIN: CPT | Mod: 25

## 2025-02-03 ENCOUNTER — APPOINTMENT (OUTPATIENT)
Dept: ORTHOPEDIC SURGERY | Facility: CLINIC | Age: 75
End: 2025-02-03
Payer: MEDICARE

## 2025-02-03 PROCEDURE — 20611 DRAIN/INJ JOINT/BURSA W/US: CPT | Mod: RT

## 2025-02-10 ENCOUNTER — APPOINTMENT (OUTPATIENT)
Dept: ORTHOPEDIC SURGERY | Facility: CLINIC | Age: 75
End: 2025-02-10
Payer: MEDICARE

## 2025-02-10 DIAGNOSIS — M17.11 UNILATERAL PRIMARY OSTEOARTHRITIS, RIGHT KNEE: ICD-10-CM

## 2025-02-10 DIAGNOSIS — M17.12 UNILATERAL PRIMARY OSTEOARTHRITIS, LEFT KNEE: ICD-10-CM

## 2025-02-10 PROCEDURE — 99212 OFFICE O/P EST SF 10 MIN: CPT | Mod: 25

## 2025-02-10 PROCEDURE — 20611 DRAIN/INJ JOINT/BURSA W/US: CPT | Mod: 50

## 2025-03-10 ENCOUNTER — APPOINTMENT (OUTPATIENT)
Dept: ORTHOPEDIC SURGERY | Facility: CLINIC | Age: 75
End: 2025-03-10
Payer: MEDICARE

## 2025-03-10 VITALS — BODY MASS INDEX: 21.79 KG/M2 | WEIGHT: 111 LBS | HEIGHT: 60 IN

## 2025-03-10 DIAGNOSIS — M17.12 UNILATERAL PRIMARY OSTEOARTHRITIS, LEFT KNEE: ICD-10-CM

## 2025-03-10 PROCEDURE — 99213 OFFICE O/P EST LOW 20 MIN: CPT

## 2025-03-11 ENCOUNTER — NON-APPOINTMENT (OUTPATIENT)
Age: 75
End: 2025-03-11

## 2025-03-11 ENCOUNTER — APPOINTMENT (OUTPATIENT)
Dept: UROGYNECOLOGY | Facility: CLINIC | Age: 75
End: 2025-03-11
Payer: MEDICARE

## 2025-03-11 DIAGNOSIS — N81.2 INCOMPLETE UTEROVAGINAL PROLAPSE: ICD-10-CM

## 2025-03-11 DIAGNOSIS — N81.11 CYSTOCELE, MIDLINE: ICD-10-CM

## 2025-03-11 DIAGNOSIS — N95.2 POSTMENOPAUSAL ATROPHIC VAGINITIS: ICD-10-CM

## 2025-03-11 PROCEDURE — 99213 OFFICE O/P EST LOW 20 MIN: CPT

## 2025-03-11 PROCEDURE — G2211 COMPLEX E/M VISIT ADD ON: CPT

## 2025-06-16 ENCOUNTER — APPOINTMENT (OUTPATIENT)
Dept: UROGYNECOLOGY | Facility: CLINIC | Age: 75
End: 2025-06-16
Payer: MEDICARE

## 2025-06-16 VITALS
HEART RATE: 69 BPM | WEIGHT: 111 LBS | DIASTOLIC BLOOD PRESSURE: 77 MMHG | BODY MASS INDEX: 15.89 KG/M2 | HEIGHT: 70 IN | SYSTOLIC BLOOD PRESSURE: 159 MMHG

## 2025-06-16 PROBLEM — N89.8 VAGINAL DISCHARGE: Status: ACTIVE | Noted: 2025-06-16

## 2025-06-16 PROCEDURE — 99213 OFFICE O/P EST LOW 20 MIN: CPT

## 2025-06-16 PROCEDURE — 99459 PELVIC EXAMINATION: CPT | Mod: NC

## 2025-06-16 PROCEDURE — G2211 COMPLEX E/M VISIT ADD ON: CPT

## 2025-06-18 PROBLEM — A49.8 GARDNERELLA INFECTION: Status: ACTIVE | Noted: 2025-06-18

## 2025-06-18 LAB
CANDIDA VAG CYTO: NOT DETECTED
G VAGINALIS+PREV SP MTYP VAG QL MICRO: DETECTED
T VAGINALIS VAG QL WET PREP: NOT DETECTED

## 2025-06-18 RX ORDER — METRONIDAZOLE 7.5 MG/G
0.75 GEL VAGINAL
Qty: 1 | Refills: 0 | Status: ACTIVE | COMMUNITY
Start: 2025-06-18 | End: 1900-01-01

## 2025-06-19 RX ORDER — CLINDAMYCIN PHOSPHATE 20 MG/G
2 CREAM VAGINAL
Qty: 1 | Refills: 0 | Status: ACTIVE | COMMUNITY
Start: 2025-06-19 | End: 1900-01-01

## 2025-06-24 ENCOUNTER — APPOINTMENT (OUTPATIENT)
Dept: UROGYNECOLOGY | Facility: CLINIC | Age: 75
End: 2025-06-24

## 2025-07-07 ENCOUNTER — APPOINTMENT (OUTPATIENT)
Dept: UROGYNECOLOGY | Facility: CLINIC | Age: 75
End: 2025-07-07
Payer: MEDICARE

## 2025-07-07 VITALS
SYSTOLIC BLOOD PRESSURE: 128 MMHG | BODY MASS INDEX: 22.38 KG/M2 | WEIGHT: 111 LBS | HEIGHT: 59 IN | DIASTOLIC BLOOD PRESSURE: 70 MMHG

## 2025-07-07 PROCEDURE — 99459 PELVIC EXAMINATION: CPT | Mod: NC

## 2025-07-07 PROCEDURE — G2211 COMPLEX E/M VISIT ADD ON: CPT

## 2025-07-07 PROCEDURE — A4562: CPT

## 2025-07-07 PROCEDURE — 99213 OFFICE O/P EST LOW 20 MIN: CPT

## 2025-07-23 ENCOUNTER — OUTPATIENT (OUTPATIENT)
Dept: OUTPATIENT SERVICES | Facility: HOSPITAL | Age: 75
LOS: 1 days | End: 2025-07-23
Payer: MEDICARE

## 2025-07-23 ENCOUNTER — APPOINTMENT (OUTPATIENT)
Dept: CARDIOLOGY | Facility: CLINIC | Age: 75
End: 2025-07-23
Payer: MEDICARE

## 2025-07-23 ENCOUNTER — APPOINTMENT (OUTPATIENT)
Dept: CV DIAGNOSITCS | Facility: HOSPITAL | Age: 75
End: 2025-07-23

## 2025-07-23 ENCOUNTER — RESULT REVIEW (OUTPATIENT)
Age: 75
End: 2025-07-23

## 2025-07-23 VITALS
BODY MASS INDEX: 22.38 KG/M2 | HEART RATE: 65 BPM | WEIGHT: 111 LBS | HEIGHT: 59 IN | SYSTOLIC BLOOD PRESSURE: 165 MMHG | OXYGEN SATURATION: 100 % | DIASTOLIC BLOOD PRESSURE: 76 MMHG

## 2025-07-23 DIAGNOSIS — I25.10 ATHEROSCLEROTIC HEART DISEASE OF NATIVE CORONARY ARTERY W/OUT ANGINA PECTORIS: ICD-10-CM

## 2025-07-23 DIAGNOSIS — I35.0 NONRHEUMATIC AORTIC (VALVE) STENOSIS: ICD-10-CM

## 2025-07-23 DIAGNOSIS — I10 ESSENTIAL (PRIMARY) HYPERTENSION: ICD-10-CM

## 2025-07-23 PROCEDURE — 99214 OFFICE O/P EST MOD 30 MIN: CPT | Mod: 25

## 2025-07-23 PROCEDURE — 93000 ELECTROCARDIOGRAM COMPLETE: CPT

## 2025-07-23 PROCEDURE — 93306 TTE W/DOPPLER COMPLETE: CPT | Mod: 26

## 2025-07-28 NOTE — HISTORY OF PRESENT ILLNESS
Your feedback means a lot to Dr. Page and her office staff! If you have a moment, please feel free to leave a review specifically about about your experience with Dr. Page and her office staff!           
[FreeTextEntry1] : Pt happy with pessary.  Reports good support with pessary.  Denies any pelvic pain or pressure.  Notes some vaginal spotting x 2 days.  Denies any problems with urination or BM.  She is using premarin BIW.